# Patient Record
Sex: MALE | Race: WHITE | Employment: FULL TIME | URBAN - NONMETROPOLITAN AREA
[De-identification: names, ages, dates, MRNs, and addresses within clinical notes are randomized per-mention and may not be internally consistent; named-entity substitution may affect disease eponyms.]

---

## 2017-02-02 ENCOUNTER — OFFICE VISIT (OUTPATIENT)
Dept: URGENT CARE | Age: 32
End: 2017-02-02
Payer: COMMERCIAL

## 2017-02-02 VITALS
TEMPERATURE: 98.2 F | RESPIRATION RATE: 16 BRPM | OXYGEN SATURATION: 99 % | DIASTOLIC BLOOD PRESSURE: 86 MMHG | HEART RATE: 74 BPM | SYSTOLIC BLOOD PRESSURE: 128 MMHG | BODY MASS INDEX: 28.06 KG/M2 | WEIGHT: 190 LBS

## 2017-02-02 DIAGNOSIS — J02.9 SORE THROAT: Primary | ICD-10-CM

## 2017-02-02 DIAGNOSIS — Z20.818 STREP THROAT EXPOSURE: ICD-10-CM

## 2017-02-02 LAB — S PYO AG THROAT QL: NORMAL

## 2017-02-02 PROCEDURE — 99213 OFFICE O/P EST LOW 20 MIN: CPT | Performed by: NURSE PRACTITIONER

## 2017-02-02 PROCEDURE — 87880 STREP A ASSAY W/OPTIC: CPT | Performed by: NURSE PRACTITIONER

## 2017-02-02 RX ORDER — CEFDINIR 300 MG/1
300 CAPSULE ORAL 2 TIMES DAILY
Qty: 20 CAPSULE | Refills: 0 | Status: SHIPPED | OUTPATIENT
Start: 2017-02-02 | End: 2017-02-12

## 2017-02-02 RX ORDER — FLUTICASONE PROPIONATE 50 MCG
1 SPRAY, SUSPENSION (ML) NASAL DAILY
Qty: 1 BOTTLE | Refills: 3 | Status: SHIPPED | OUTPATIENT
Start: 2017-02-02 | End: 2017-07-06 | Stop reason: ALTCHOICE

## 2017-02-02 ASSESSMENT — ENCOUNTER SYMPTOMS
COUGH: 0
EYES NEGATIVE: 1
SORE THROAT: 1
SINUS PRESSURE: 1
GASTROINTESTINAL NEGATIVE: 1

## 2017-07-06 ENCOUNTER — OFFICE VISIT (OUTPATIENT)
Dept: URGENT CARE | Age: 32
End: 2017-07-06
Payer: COMMERCIAL

## 2017-07-06 VITALS
BODY MASS INDEX: 30.36 KG/M2 | HEIGHT: 69 IN | OXYGEN SATURATION: 97 % | TEMPERATURE: 97.9 F | WEIGHT: 205 LBS | SYSTOLIC BLOOD PRESSURE: 128 MMHG | RESPIRATION RATE: 20 BRPM | DIASTOLIC BLOOD PRESSURE: 88 MMHG | HEART RATE: 67 BPM

## 2017-07-06 DIAGNOSIS — Z20.818 EXPOSURE TO STREPTOCOCCAL PHARYNGITIS: Primary | ICD-10-CM

## 2017-07-06 DIAGNOSIS — J02.9 PHARYNGITIS, UNSPECIFIED ETIOLOGY: ICD-10-CM

## 2017-07-06 DIAGNOSIS — J02.9 SORE THROAT: ICD-10-CM

## 2017-07-06 LAB — S PYO AG THROAT QL: NORMAL

## 2017-07-06 PROCEDURE — 87880 STREP A ASSAY W/OPTIC: CPT | Performed by: NURSE PRACTITIONER

## 2017-07-06 PROCEDURE — 99213 OFFICE O/P EST LOW 20 MIN: CPT | Performed by: NURSE PRACTITIONER

## 2017-07-06 RX ORDER — CEFDINIR 300 MG/1
300 CAPSULE ORAL 2 TIMES DAILY
Qty: 20 CAPSULE | Refills: 0 | Status: SHIPPED | OUTPATIENT
Start: 2017-07-06 | End: 2017-10-19 | Stop reason: SDUPTHER

## 2017-07-06 ASSESSMENT — ENCOUNTER SYMPTOMS
ABDOMINAL PAIN: 0
SORE THROAT: 1

## 2017-08-28 ENCOUNTER — OFFICE VISIT (OUTPATIENT)
Dept: FAMILY MEDICINE CLINIC | Age: 32
End: 2017-08-28
Payer: COMMERCIAL

## 2017-08-28 VITALS
WEIGHT: 193 LBS | OXYGEN SATURATION: 98 % | TEMPERATURE: 97.2 F | BODY MASS INDEX: 28.58 KG/M2 | RESPIRATION RATE: 18 BRPM | HEART RATE: 63 BPM | DIASTOLIC BLOOD PRESSURE: 82 MMHG | SYSTOLIC BLOOD PRESSURE: 120 MMHG | HEIGHT: 69 IN

## 2017-08-28 DIAGNOSIS — J45.20 ASTHMA, MILD INTERMITTENT, WELL-CONTROLLED: ICD-10-CM

## 2017-08-28 DIAGNOSIS — Z00.00 ANNUAL PHYSICAL EXAM: Primary | ICD-10-CM

## 2017-08-28 DIAGNOSIS — J30.89 PERENNIAL ALLERGIC RHINITIS: ICD-10-CM

## 2017-08-28 DIAGNOSIS — F41.8 DEPRESSION WITH ANXIETY: ICD-10-CM

## 2017-08-28 PROBLEM — R53.83 FATIGUE: Status: ACTIVE | Noted: 2017-08-28

## 2017-08-28 PROCEDURE — 99395 PREV VISIT EST AGE 18-39: CPT | Performed by: INTERNAL MEDICINE

## 2017-08-28 RX ORDER — TRAZODONE HYDROCHLORIDE 50 MG/1
50 TABLET ORAL NIGHTLY
COMMUNITY
End: 2017-08-28 | Stop reason: ALTCHOICE

## 2017-08-28 ASSESSMENT — ENCOUNTER SYMPTOMS
WHEEZING: 0
BLOOD IN STOOL: 0
COUGH: 0
VOMITING: 0
EYE REDNESS: 0
EYE DISCHARGE: 0
SINUS PRESSURE: 0
COLOR CHANGE: 0
ABDOMINAL PAIN: 0
SORE THROAT: 0
DIARRHEA: 0
EYE PAIN: 0
RHINORRHEA: 0
SHORTNESS OF BREATH: 0
VOICE CHANGE: 0
CHEST TIGHTNESS: 0

## 2017-08-28 ASSESSMENT — PATIENT HEALTH QUESTIONNAIRE - PHQ9
SUM OF ALL RESPONSES TO PHQ QUESTIONS 1-9: 0
1. LITTLE INTEREST OR PLEASURE IN DOING THINGS: 0
SUM OF ALL RESPONSES TO PHQ9 QUESTIONS 1 & 2: 0
2. FEELING DOWN, DEPRESSED OR HOPELESS: 0

## 2017-09-11 DIAGNOSIS — Z00.00 ANNUAL PHYSICAL EXAM: ICD-10-CM

## 2017-09-11 LAB
ALBUMIN SERPL-MCNC: 4.6 G/DL (ref 3.5–5.2)
ALP BLD-CCNC: 80 U/L (ref 40–130)
ALT SERPL-CCNC: 20 U/L (ref 5–41)
ANION GAP SERPL CALCULATED.3IONS-SCNC: 17 MMOL/L (ref 7–19)
AST SERPL-CCNC: 18 U/L (ref 5–40)
BASOPHILS ABSOLUTE: 0 K/UL (ref 0–0.2)
BASOPHILS RELATIVE PERCENT: 0.5 % (ref 0–1)
BILIRUB SERPL-MCNC: 0.7 MG/DL (ref 0.2–1.2)
BUN BLDV-MCNC: 12 MG/DL (ref 6–20)
CALCIUM SERPL-MCNC: 9.4 MG/DL (ref 8.6–10)
CHLORIDE BLD-SCNC: 99 MMOL/L (ref 98–111)
CHOLESTEROL, TOTAL: 152 MG/DL (ref 160–199)
CO2: 24 MMOL/L (ref 22–29)
CREAT SERPL-MCNC: 1.1 MG/DL (ref 0.5–1.2)
EOSINOPHILS ABSOLUTE: 0.1 K/UL (ref 0–0.6)
EOSINOPHILS RELATIVE PERCENT: 0.9 % (ref 0–5)
GFR NON-AFRICAN AMERICAN: >60
GLUCOSE BLD-MCNC: 77 MG/DL (ref 74–109)
HCT VFR BLD CALC: 44.2 % (ref 42–52)
HDLC SERPL-MCNC: 46 MG/DL (ref 55–121)
HEMOGLOBIN: 15.8 G/DL (ref 14–18)
LDL CHOLESTEROL CALCULATED: 83 MG/DL
LYMPHOCYTES ABSOLUTE: 2.2 K/UL (ref 1.1–4.5)
LYMPHOCYTES RELATIVE PERCENT: 40.7 % (ref 20–40)
MCH RBC QN AUTO: 30.9 PG (ref 27–31)
MCHC RBC AUTO-ENTMCNC: 35.7 G/DL (ref 33–37)
MCV RBC AUTO: 86.3 FL (ref 80–94)
MONOCYTES ABSOLUTE: 0.3 K/UL (ref 0–0.9)
MONOCYTES RELATIVE PERCENT: 5.7 % (ref 0–10)
NEUTROPHILS ABSOLUTE: 2.8 K/UL (ref 1.5–7.5)
NEUTROPHILS RELATIVE PERCENT: 52 % (ref 50–65)
PDW BLD-RTO: 12.4 % (ref 11.5–14.5)
PLATELET # BLD: 261 K/UL (ref 130–400)
PMV BLD AUTO: 10.2 FL (ref 9.4–12.4)
POTASSIUM SERPL-SCNC: 4 MMOL/L (ref 3.5–5)
RBC # BLD: 5.12 M/UL (ref 4.7–6.1)
SODIUM BLD-SCNC: 140 MMOL/L (ref 136–145)
TOTAL PROTEIN: 7.6 G/DL (ref 6.6–8.7)
TRIGL SERPL-MCNC: 113 MG/DL (ref 150–199)
WBC # BLD: 5.5 K/UL (ref 4.8–10.8)

## 2017-09-13 DIAGNOSIS — R53.83 FATIGUE, UNSPECIFIED TYPE: Primary | ICD-10-CM

## 2017-09-13 DIAGNOSIS — Z00.00 VISIT FOR ANNUAL HEALTH EXAMINATION: ICD-10-CM

## 2017-10-19 ENCOUNTER — OFFICE VISIT (OUTPATIENT)
Dept: URGENT CARE | Age: 32
End: 2017-10-19
Payer: COMMERCIAL

## 2017-10-19 VITALS
SYSTOLIC BLOOD PRESSURE: 124 MMHG | HEART RATE: 73 BPM | TEMPERATURE: 99.1 F | WEIGHT: 188.6 LBS | HEIGHT: 69 IN | OXYGEN SATURATION: 97 % | RESPIRATION RATE: 20 BRPM | DIASTOLIC BLOOD PRESSURE: 73 MMHG | BODY MASS INDEX: 27.93 KG/M2

## 2017-10-19 DIAGNOSIS — Z20.818 EXPOSURE TO STREP THROAT: Primary | ICD-10-CM

## 2017-10-19 DIAGNOSIS — J02.9 SORE THROAT: ICD-10-CM

## 2017-10-19 LAB — S PYO AG THROAT QL: NORMAL

## 2017-10-19 PROCEDURE — 87880 STREP A ASSAY W/OPTIC: CPT | Performed by: NURSE PRACTITIONER

## 2017-10-19 PROCEDURE — 99213 OFFICE O/P EST LOW 20 MIN: CPT | Performed by: NURSE PRACTITIONER

## 2017-10-19 RX ORDER — CEFDINIR 300 MG/1
300 CAPSULE ORAL 2 TIMES DAILY
Qty: 20 CAPSULE | Refills: 0 | Status: SHIPPED | OUTPATIENT
Start: 2017-10-19 | End: 2017-10-29

## 2017-10-19 ASSESSMENT — ENCOUNTER SYMPTOMS
SORE THROAT: 1
GASTROINTESTINAL NEGATIVE: 1
COUGH: 0

## 2017-10-19 NOTE — PROGRESS NOTES
[address] [facility] [address] [address]  Dept: [phone]  Loc: [phone]    Sukhwinder Wills is a 32 y.o. male who presents today for his medical conditions/complaints as noted below. Sukhwinder Wills is c/o of Pharyngitis (x 2 days- wife has strep throat) and Ear Fullness        HPI:     HPI     Patient presents to office complaining of sore throat and ear fullness for the past 2 days. He states that his throat has been getting more sore and he started with low grade fever today. He states that his ears feel full. He denies any headache, abd pain, vomiting or diarrhea. He states that his wife tested positive for Strep on Monday of this week. Past Medical History:   Diagnosis Date    Anxiety     Asthma     childhood - no problems now    Chronic back pain     Gynecomastia     Insomnia     Perennial allergic rhinitis 8/28/2017      Past Surgical History:   Procedure Laterality Date    CYST REMOVAL      CYST REMOVAL      MASTECTOMY  2003    MASTECTOMY Right        Family History   Problem Relation Age of Onset    Heart Disease Paternal Grandfather     Cancer Father      prostate    Diabetes Daughter        Social History   Substance Use Topics    Smoking status: Never Smoker    Smokeless tobacco: Never Used    Alcohol use 0.0 oz/week      Comment: rarely      Current Outpatient Prescriptions   Medication Sig Dispense Refill    cefdinir (OMNICEF) 300 MG capsule Take 1 capsule by mouth 2 times daily for 10 days 20 capsule 0    Multiple Vitamins-Minerals (THERAPEUTIC MULTIVITAMIN-MINERALS) tablet Take 1 tablet by mouth daily      Fexofenadine HCl (ALLEGRA PO) Take by mouth       No current facility-administered medications for this visit.       Allergies   Allergen Reactions    Amoxicillin     Other Rash     STERI STRIPS    Pcn [Penicillins] Rash       Health Maintenance   Topic Date Due    HIV screen  12/16/2000   

## 2017-10-19 NOTE — PATIENT INSTRUCTIONS
Patient Education        Sore Throat: Care Instructions  Your Care Instructions    Infection by bacteria or a virus causes most sore throats. Cigarette smoke, dry air, air pollution, allergies, and yelling can also cause a sore throat. Sore throats can be painful and annoying. Fortunately, most sore throats go away on their own. If you have a bacterial infection, your doctor may prescribe antibiotics. Follow-up care is a key part of your treatment and safety. Be sure to make and go to all appointments, and call your doctor if you are having problems. It's also a good idea to know your test results and keep a list of the medicines you take. How can you care for yourself at home? · If your doctor prescribed antibiotics, take them as directed. Do not stop taking them just because you feel better. You need to take the full course of antibiotics. · Gargle with warm salt water once an hour to help reduce swelling and relieve discomfort. Use 1 teaspoon of salt mixed in 1 cup of warm water. · Take an over-the-counter pain medicine, such as acetaminophen (Tylenol), ibuprofen (Advil, Motrin), or naproxen (Aleve). Read and follow all instructions on the label. · Be careful when taking over-the-counter cold or flu medicines and Tylenol at the same time. Many of these medicines have acetaminophen, which is Tylenol. Read the labels to make sure that you are not taking more than the recommended dose. Too much acetaminophen (Tylenol) can be harmful. · Drink plenty of fluids. Fluids may help soothe an irritated throat. Hot fluids, such as tea or soup, may help decrease throat pain. · Use over-the-counter throat lozenges to soothe pain. Regular cough drops or hard candy may also help. These should not be given to young children because of the risk of choking. · Do not smoke or allow others to smoke around you. If you need help quitting, talk to your doctor about stop-smoking programs and medicines.  These can increase your

## 2018-03-15 ENCOUNTER — OFFICE VISIT (OUTPATIENT)
Dept: FAMILY MEDICINE CLINIC | Age: 33
End: 2018-03-15
Payer: COMMERCIAL

## 2018-03-15 ENCOUNTER — HOSPITAL ENCOUNTER (OUTPATIENT)
Dept: ULTRASOUND IMAGING | Age: 33
Discharge: HOME | End: 2018-03-15
Payer: COMMERCIAL

## 2018-03-15 VITALS
BODY MASS INDEX: 29.51 KG/M2 | TEMPERATURE: 98.1 F | HEIGHT: 69 IN | WEIGHT: 199.2 LBS | OXYGEN SATURATION: 98 % | SYSTOLIC BLOOD PRESSURE: 134 MMHG | HEART RATE: 81 BPM | DIASTOLIC BLOOD PRESSURE: 88 MMHG

## 2018-03-15 DIAGNOSIS — R10.32 LEFT GROIN PAIN: Primary | ICD-10-CM

## 2018-03-15 DIAGNOSIS — R10.32 LEFT GROIN PAIN: ICD-10-CM

## 2018-03-15 PROCEDURE — 76705 ECHO EXAM OF ABDOMEN: CPT

## 2018-03-15 PROCEDURE — 99213 OFFICE O/P EST LOW 20 MIN: CPT | Performed by: CLINICAL NURSE SPECIALIST

## 2018-03-15 ASSESSMENT — ENCOUNTER SYMPTOMS
SORE THROAT: 0
TROUBLE SWALLOWING: 0
SHORTNESS OF BREATH: 0
VOMITING: 0
ABDOMINAL DISTENTION: 0
DIARRHEA: 0
SINUS PRESSURE: 0
WHEEZING: 0
NAUSEA: 0
CONSTIPATION: 0
BACK PAIN: 0
ABDOMINAL PAIN: 0
COLOR CHANGE: 0
COUGH: 0
CHEST TIGHTNESS: 0

## 2018-03-15 NOTE — PROGRESS NOTES
swallowing. Respiratory: Negative for cough, chest tightness, shortness of breath and wheezing. Cardiovascular: Negative for chest pain, palpitations and leg swelling. Gastrointestinal: Negative for abdominal distention, abdominal pain, constipation, diarrhea, nausea and vomiting. Genitourinary: Positive for testicular pain. Negative for difficulty urinating, dysuria, flank pain, frequency, hematuria, scrotal swelling and urgency. Musculoskeletal: Negative for arthralgias and back pain. Skin: Negative for color change and rash. Neurological: Negative for dizziness, syncope, weakness, light-headedness and headaches. Psychiatric/Behavioral: Negative for confusion. The patient is not nervous/anxious. OBJECTIVE:  /88   Pulse 81   Temp 98.1 °F (36.7 °C) (Tympanic)   Ht 5' 9\" (1.753 m)   Wt 199 lb 3.2 oz (90.4 kg)   SpO2 98%   BMI 29.42 kg/m²    Physical Exam   Constitutional: He is oriented to person, place, and time. He appears well-developed and well-nourished. No distress. HENT:   Head: Normocephalic and atraumatic. Mouth/Throat: Oropharynx is clear and moist.   Eyes: Conjunctivae are normal. Pupils are equal, round, and reactive to light. Right eye exhibits no discharge. Left eye exhibits no discharge. Cardiovascular: Normal rate and regular rhythm. No murmur heard. Pulmonary/Chest: Effort normal and breath sounds normal. No respiratory distress. He has no wheezes. He has no rales. Abdominal: Soft. Bowel sounds are normal. He exhibits no distension. There is no tenderness. A hernia is present. Hernia confirmed positive in the left inguinal area. Musculoskeletal: Normal range of motion. He exhibits no deformity. Neurological: He is alert and oriented to person, place, and time. No cranial nerve deficit. Skin: Skin is warm and dry. No rash noted. No erythema. Psychiatric: He has a normal mood and affect.  Thought content normal.   Vitals reviewed. ASSESSMENT/PLAN:  1. Left groin pain  Fluctuance noted at left groin, reproducible pain with leg raises  Ultrasound today for further clarification of left groin pain/hernia  - US ABDOMINAL LIMITED; Future         Return for already scheduled.

## 2018-03-28 ENCOUNTER — OFFICE VISIT (OUTPATIENT)
Dept: FAMILY MEDICINE CLINIC | Age: 33
End: 2018-03-28
Payer: COMMERCIAL

## 2018-03-28 VITALS
HEIGHT: 69 IN | OXYGEN SATURATION: 98 % | SYSTOLIC BLOOD PRESSURE: 106 MMHG | TEMPERATURE: 97.9 F | WEIGHT: 183.25 LBS | DIASTOLIC BLOOD PRESSURE: 78 MMHG | BODY MASS INDEX: 27.14 KG/M2 | HEART RATE: 87 BPM

## 2018-03-28 DIAGNOSIS — R10.32 LLQ ABDOMINAL PAIN: Primary | ICD-10-CM

## 2018-03-28 DIAGNOSIS — R10.32 LLQ ABDOMINAL PAIN: ICD-10-CM

## 2018-03-28 LAB
ALBUMIN SERPL-MCNC: 5 G/DL (ref 3.5–5.2)
ALP BLD-CCNC: 83 U/L (ref 40–130)
ALT SERPL-CCNC: 18 U/L (ref 5–41)
ANION GAP SERPL CALCULATED.3IONS-SCNC: 14 MMOL/L (ref 7–19)
AST SERPL-CCNC: 17 U/L (ref 5–40)
BASOPHILS ABSOLUTE: 0 K/UL (ref 0–0.2)
BASOPHILS RELATIVE PERCENT: 1 % (ref 0–1)
BILIRUB SERPL-MCNC: 0.6 MG/DL (ref 0.2–1.2)
BILIRUBIN URINE: NEGATIVE
BLOOD, URINE: NEGATIVE
BUN BLDV-MCNC: 17 MG/DL (ref 6–20)
CALCIUM SERPL-MCNC: 9.5 MG/DL (ref 8.6–10)
CHLORIDE BLD-SCNC: 99 MMOL/L (ref 98–111)
CLARITY: CLEAR
CO2: 27 MMOL/L (ref 22–29)
COLOR: YELLOW
CREAT SERPL-MCNC: 0.9 MG/DL (ref 0.5–1.2)
EOSINOPHILS ABSOLUTE: 0 K/UL (ref 0–0.6)
EOSINOPHILS RELATIVE PERCENT: 0.7 % (ref 0–5)
GFR NON-AFRICAN AMERICAN: >60
GLUCOSE BLD-MCNC: 98 MG/DL (ref 74–109)
GLUCOSE URINE: NEGATIVE MG/DL
HCT VFR BLD CALC: 50.9 % (ref 42–52)
HEMOGLOBIN: 17.9 G/DL (ref 14–18)
KETONES, URINE: 40 MG/DL
LEUKOCYTE ESTERASE, URINE: NEGATIVE
LYMPHOCYTES ABSOLUTE: 1.5 K/UL (ref 1.1–4.5)
LYMPHOCYTES RELATIVE PERCENT: 35.9 % (ref 20–40)
MCH RBC QN AUTO: 29.9 PG (ref 27–31)
MCHC RBC AUTO-ENTMCNC: 35.2 G/DL (ref 33–37)
MCV RBC AUTO: 85 FL (ref 80–94)
MONOCYTES ABSOLUTE: 0.3 K/UL (ref 0–0.9)
MONOCYTES RELATIVE PERCENT: 6.3 % (ref 0–10)
NEUTROPHILS ABSOLUTE: 2.3 K/UL (ref 1.5–7.5)
NEUTROPHILS RELATIVE PERCENT: 55.9 % (ref 50–65)
NITRITE, URINE: NEGATIVE
PDW BLD-RTO: 12 % (ref 11.5–14.5)
PH UA: 6
PLATELET # BLD: 281 K/UL (ref 130–400)
PMV BLD AUTO: 9.7 FL (ref 9.4–12.4)
POTASSIUM SERPL-SCNC: 4 MMOL/L (ref 3.5–5)
PROTEIN UA: NEGATIVE MG/DL
RBC # BLD: 5.99 M/UL (ref 4.7–6.1)
SEDIMENTATION RATE, ERYTHROCYTE: 2 MM/HR (ref 0–10)
SODIUM BLD-SCNC: 140 MMOL/L (ref 136–145)
SPECIFIC GRAVITY UA: 1.01
TOTAL PROTEIN: 7.8 G/DL (ref 6.6–8.7)
URINE TYPE: ABNORMAL
UROBILINOGEN, URINE: 0.2 E.U./DL
WBC # BLD: 4.1 K/UL (ref 4.8–10.8)

## 2018-03-28 PROCEDURE — 99213 OFFICE O/P EST LOW 20 MIN: CPT | Performed by: NURSE PRACTITIONER

## 2018-03-28 ASSESSMENT — ENCOUNTER SYMPTOMS
SORE THROAT: 0
VOMITING: 0
CHEST TIGHTNESS: 0
WHEEZING: 0
BLOOD IN STOOL: 0
ABDOMINAL PAIN: 1
DIARRHEA: 0
COUGH: 0
SHORTNESS OF BREATH: 0
NAUSEA: 0

## 2018-04-06 ENCOUNTER — HOSPITAL ENCOUNTER (OUTPATIENT)
Dept: CT IMAGING | Age: 33
Discharge: HOME | End: 2018-04-06
Payer: COMMERCIAL

## 2018-04-06 DIAGNOSIS — R10.32 LLQ ABDOMINAL PAIN: ICD-10-CM

## 2018-04-06 PROCEDURE — 6360000004 HC RX CONTRAST MEDICATION: Performed by: NURSE PRACTITIONER

## 2018-04-06 PROCEDURE — 74177 CT ABD & PELVIS W/CONTRAST: CPT

## 2018-04-06 RX ADMIN — IOPAMIDOL 75 ML: 755 INJECTION, SOLUTION INTRAVENOUS at 09:44

## 2018-05-30 LAB
CHOLESTEROL, TOTAL: 170 MG/DL (ref 160–199)
GLUCOSE BLD-MCNC: 99 MG/DL (ref 74–109)
HDLC SERPL-MCNC: 52 MG/DL (ref 55–121)
LDL CHOLESTEROL CALCULATED: 106 MG/DL
TRIGL SERPL-MCNC: 62 MG/DL (ref 0–149)

## 2018-06-01 ENCOUNTER — EMPLOYEE WELLNESS (OUTPATIENT)
Dept: OTHER | Age: 33
End: 2018-06-01

## 2018-06-11 VITALS — WEIGHT: 176 LBS | BODY MASS INDEX: 25.99 KG/M2

## 2018-07-05 ENCOUNTER — HOSPITAL ENCOUNTER (EMERGENCY)
Age: 33
Discharge: HOME | End: 2018-07-05
Payer: COMMERCIAL

## 2018-07-05 VITALS
HEIGHT: 69 IN | SYSTOLIC BLOOD PRESSURE: 118 MMHG | WEIGHT: 178 LBS | DIASTOLIC BLOOD PRESSURE: 75 MMHG | HEART RATE: 94 BPM | RESPIRATION RATE: 20 BRPM | OXYGEN SATURATION: 95 % | BODY MASS INDEX: 26.36 KG/M2 | TEMPERATURE: 99.1 F
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
## 2018-07-05 PROCEDURE — 12002 RPR S/N/AX/GEN/TRNK2.6-7.5CM: CPT | Performed by: PHYSICIAN ASSISTANT

## 2018-07-05 PROCEDURE — 99282 EMERGENCY DEPT VISIT SF MDM: CPT

## 2018-07-05 PROCEDURE — 12002 RPR S/N/AX/GEN/TRNK2.6-7.5CM: CPT

## 2018-07-05 PROCEDURE — 90471 IMMUNIZATION ADMIN: CPT | Performed by: PHYSICIAN ASSISTANT

## 2018-07-05 PROCEDURE — 6360000002 HC RX W HCPCS: Performed by: PHYSICIAN ASSISTANT

## 2018-07-05 PROCEDURE — 90715 TDAP VACCINE 7 YRS/> IM: CPT | Performed by: PHYSICIAN ASSISTANT

## 2018-07-05 PROCEDURE — 99283 EMERGENCY DEPT VISIT LOW MDM: CPT | Performed by: PHYSICIAN ASSISTANT

## 2018-07-05 PROCEDURE — 2500000003 HC RX 250 WO HCPCS: Performed by: PHYSICIAN ASSISTANT

## 2018-07-05 PROCEDURE — 6370000000 HC RX 637 (ALT 250 FOR IP): Performed by: PHYSICIAN ASSISTANT

## 2018-07-05 RX ORDER — HYDROCODONE BITARTRATE AND ACETAMINOPHEN 7.5; 325 MG/1; MG/1
1 TABLET ORAL ONCE
Status: COMPLETED | OUTPATIENT
Start: 2018-07-05 | End: 2018-07-05

## 2018-07-05 RX ORDER — DOXYCYCLINE HYCLATE 100 MG
100 TABLET ORAL 2 TIMES DAILY
Qty: 20 TABLET | Refills: 0 | Status: SHIPPED | OUTPATIENT
Start: 2018-07-05 | End: 2018-07-15

## 2018-07-05 RX ORDER — METRONIDAZOLE 500 MG/1
500 TABLET ORAL 3 TIMES DAILY
Qty: 30 TABLET | Refills: 0 | Status: SHIPPED | OUTPATIENT
Start: 2018-07-05 | End: 2018-07-15

## 2018-07-05 RX ORDER — LIDOCAINE HYDROCHLORIDE 10 MG/ML
10 INJECTION, SOLUTION INFILTRATION; PERINEURAL ONCE
Status: COMPLETED | OUTPATIENT
Start: 2018-07-05 | End: 2018-07-05

## 2018-07-05 RX ADMIN — TETANUS TOXOID, REDUCED DIPHTHERIA TOXOID AND ACELLULAR PERTUSSIS VACCINE, ADSORBED 0.5 ML: 5; 2.5; 8; 8; 2.5 SUSPENSION INTRAMUSCULAR at 19:19

## 2018-07-05 RX ADMIN — HYDROCODONE BITARTRATE AND ACETAMINOPHEN 1 TABLET: 7.5; 325 TABLET ORAL at 19:20

## 2018-07-05 RX ADMIN — LIDOCAINE HYDROCHLORIDE 10 ML: 10 INJECTION, SOLUTION INFILTRATION; PERINEURAL at 19:19

## 2018-07-05 ASSESSMENT — PAIN SCALES - GENERAL
PAINLEVEL_OUTOF10: 4
PAINLEVEL_OUTOF10: 5

## 2018-07-05 NOTE — ED NOTES
[place] [employer] dispatch called us about a pt that was bit by aldair Kingston and to call them back when they arrive. I called and spoke with Police Dispatch and they are calling Animal Control to let them know the pt is here.        Evan Herbert RN  07/05/18 [id number]

## 2018-07-06 ASSESSMENT — ENCOUNTER SYMPTOMS
COLOR CHANGE: 0
SHORTNESS OF BREATH: 0
ABDOMINAL DISTENTION: 0
VOMITING: 0
CONSTIPATION: 0
COUGH: 0
NAUSEA: 0
WHEEZING: 0
STRIDOR: 0
ABDOMINAL PAIN: 0
SORE THROAT: 0
CHEST TIGHTNESS: 0
RHINORRHEA: 0
BACK PAIN: 0

## 2018-07-06 NOTE — ED PROVIDER NOTES
eMERGENCY dEPARTMENT eNCOUnter      Pt Name: Landy Dent  MRN: [id number]  [place] 1985  Date of evaluation: 7/5/2018  Provider: Harinder Lion Dr       Chief Complaint   Patient presents with    Animal Bite     Pt to ED with c/o dog bite approx 1 hour PTA         HISTORY OF PRESENT ILLNESS  (Location/Symptom, Timing/Onset, Context/Setting, Quality, Duration, Modifying Factors, Severity.)   Landy Dent is a 28 y.o. male who presents to the emergency department with a dog bite. Patient was walking on the sidewalk when he was attacked by 3 dogs. He is unsure if they are immunized. Animal control has been notified and they're here in the ED with the patient. Unsure of tetanus status. He has multiple puncture wounds and lacerations. Nursing Notes were reviewed and I agree. REVIEW OF SYSTEMS    (2-9 systems for level 4, 10 or more for level 5)     Review of Systems   Constitutional: Negative for chills, fatigue and fever. HENT: Negative for congestion, rhinorrhea, sneezing and sore throat. Respiratory: Negative for cough, chest tightness, shortness of breath, wheezing and stridor. Cardiovascular: Negative for chest pain. Gastrointestinal: Negative for abdominal distention, abdominal pain, constipation, nausea and vomiting. Genitourinary: Negative for dysuria, flank pain and hematuria. Musculoskeletal: Negative for arthralgias, back pain, joint swelling and neck pain. Skin: Positive for wound. Negative for color change and rash. Neurological: Negative for dizziness, syncope and headaches. Psychiatric/Behavioral: Negative for confusion. Except as noted above the remainder of the review of systems was reviewed and negative.        PAST MEDICAL HISTORY     Past Medical History:   Diagnosis Date    Anxiety     Asthma     childhood - no problems now    Chronic back pain     Gynecomastia     Insomnia     Perennial allergic rhinitis 8/28/2017 SURGICAL HISTORY       Past Surgical History:   Procedure Laterality Date    CYST REMOVAL      CYST REMOVAL      MASTECTOMY  2003    MASTECTOMY Right          CURRENT MEDICATIONS       Discharge Medication List as of 7/5/2018  7:59 PM      CONTINUE these medications which have NOT CHANGED    Details   Multiple Vitamins-Minerals (THERAPEUTIC MULTIVITAMIN-MINERALS) tablet Take 1 tablet by mouth daily      Fexofenadine HCl (ALLEGRA PO) Take by mouth             ALLERGIES     Amoxicillin; Other; and Pcn [penicillins]    FAMILY HISTORY       Family History   Problem Relation Age of Onset    Heart Disease Paternal Grandfather     Cancer Father         prostate    Diabetes Daughter           SOCIAL HISTORY       Social History     Social History    Marital status:      Spouse name: N/A    Number of children: N/A    Years of education: N/A     Social History Main Topics    Smoking status: Never Smoker    Smokeless tobacco: Never Used    Alcohol use 0.0 oz/week      Comment: rarely    Drug use: No    Sexual activity: Not Asked     Other Topics Concern    None     Social History Narrative    None       SCREENINGS           PHYSICAL EXAM    (up to 7 for level 4, 8 or more for level 5)     ED Triage Vitals [07/05/18 1842]   BP Temp Temp src Pulse Resp SpO2 Height Weight   118/75 99.1 °F (37.3 °C) -- 94 20 95 % 5' 9\" (1.753 m) 178 lb (80.7 kg)       Physical Exam   Constitutional: He is oriented to person, place, and time. He appears well-developed and well-nourished. No distress. HENT:   Head: Normocephalic and atraumatic. Neck: Normal range of motion. Cardiovascular: Normal rate, regular rhythm and normal heart sounds. Exam reveals no gallop and no friction rub. No murmur heard. Pulmonary/Chest: Effort normal and breath sounds normal. No respiratory distress. He has no wheezes. He has no rales. He exhibits no tenderness. Abdominal: Soft.  Bowel sounds are normal. He exhibits no distension. There is no tenderness. There is no rebound and no guarding. Lymphadenopathy:     He has no cervical adenopathy. Neurological: He is alert and oriented to person, place, and time. Skin: Skin is warm and dry. He is not diaphoretic. Psychiatric: He has a normal mood and affect. Nursing note and vitals reviewed. DIAGNOSTIC RESULTS     RADIOLOGY:   Non-plain film images such as CT, Ultrasound and MRI are read by the radiologist.   Interpretation per the Radiologist below, if available at the time of this note:    No orders to display       LABS:  Labs Reviewed - No data to display    All other labs were within normal range or not returned as of this dictation. EMERGENCY DEPARTMENT COURSE and DIFFERENTIAL DIAGNOSIS/MDM:   Vitals:    Vitals:    07/05/18 1842   BP: 118/75   Pulse: 94   Resp: 20   Temp: 99.1 °F (37.3 °C)   SpO2: 95%   Weight: 178 lb (80.7 kg)   Height: 5' 9\" (1.753 m)           MDM  Number of Diagnoses or Management Options  Dog bite, initial encounter:   Diagnosis management comments: Patient tolerated laceration repair well. All his wounds are copiously irrigated with normal saline. I educated the patient on observing his wounds for signs of infection. We will start him on antibiotic therapy. Animals are in custody of animal control for observation for signs of rabies. I told the patient if any of the animal show signs of rabies he will need to receive rabies vaccinations. He is understanding with the care plan stable ready for discharge. Staples can be removed in one week.       PROCEDURES:    Lac Repair  Date/Time: 7/5/2018 7:57 PM  Performed by: Vonda Pimentel  Authorized by: Vonda Pimentel     Consent:     Consent obtained:  Verbal    Consent given by:  Patient    Risks discussed:  Infection, pain, retained foreign body, tendon damage, poor cosmetic result, need for additional repair, poor wound healing, vascular damage and nerve damage    Alternatives

## 2018-07-13 ENCOUNTER — OFFICE VISIT (OUTPATIENT)
Dept: FAMILY MEDICINE CLINIC | Age: 33
End: 2018-07-13
Payer: COMMERCIAL

## 2018-07-13 VITALS
HEART RATE: 66 BPM | OXYGEN SATURATION: 98 % | DIASTOLIC BLOOD PRESSURE: 80 MMHG | TEMPERATURE: 97.9 F | BODY MASS INDEX: 26.61 KG/M2 | WEIGHT: 180.2 LBS | SYSTOLIC BLOOD PRESSURE: 116 MMHG

## 2018-07-13 DIAGNOSIS — Z48.02 VISIT FOR SUTURE REMOVAL: ICD-10-CM

## 2018-07-13 DIAGNOSIS — S30.0XXD CONTUSION OF BUTTOCK, SUBSEQUENT ENCOUNTER: ICD-10-CM

## 2018-07-13 DIAGNOSIS — S40.819D: ICD-10-CM
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
## 2018-07-13 PROCEDURE — 99213 OFFICE O/P EST LOW 20 MIN: CPT | Performed by: CLINICAL NURSE SPECIALIST

## 2018-07-13 ASSESSMENT — ENCOUNTER SYMPTOMS
SORE THROAT: 0
CHEST TIGHTNESS: 0
SINUS PRESSURE: 0
SHORTNESS OF BREATH: 0
TROUBLE SWALLOWING: 0
WHEEZING: 0
COLOR CHANGE: 1
COUGH: 0

## 2018-09-05 ENCOUNTER — OFFICE VISIT (OUTPATIENT)
Dept: URGENT CARE | Age: 33
End: 2018-09-05
Payer: COMMERCIAL

## 2018-09-05 VITALS
HEIGHT: 69 IN | DIASTOLIC BLOOD PRESSURE: 76 MMHG | BODY MASS INDEX: 27.34 KG/M2 | WEIGHT: 184.6 LBS | SYSTOLIC BLOOD PRESSURE: 114 MMHG | HEART RATE: 71 BPM | RESPIRATION RATE: 18 BRPM | TEMPERATURE: 97.9 F | OXYGEN SATURATION: 98 %

## 2018-09-05 DIAGNOSIS — J06.9 URI, ACUTE: Primary | ICD-10-CM

## 2018-09-05 PROCEDURE — 99213 OFFICE O/P EST LOW 20 MIN: CPT | Performed by: NURSE PRACTITIONER

## 2018-09-05 RX ORDER — GUAIFENESIN 600 MG/1
600 TABLET, EXTENDED RELEASE ORAL 2 TIMES DAILY
Qty: 30 TABLET | Refills: 0 | Status: SHIPPED | OUTPATIENT
Start: 2018-09-05 | End: 2018-09-14 | Stop reason: ALTCHOICE

## 2018-09-05 RX ORDER — FLUTICASONE PROPIONATE 50 MCG
1 SPRAY, SUSPENSION (ML) NASAL DAILY
Qty: 1 BOTTLE | Refills: 3 | Status: SHIPPED | OUTPATIENT
Start: 2018-09-05 | End: 2020-12-07 | Stop reason: ALTCHOICE

## 2018-09-05 RX ORDER — BENZONATATE 100 MG/1
100 CAPSULE ORAL 3 TIMES DAILY PRN
Qty: 21 CAPSULE | Refills: 0 | Status: SHIPPED | OUTPATIENT
Start: 2018-09-05 | End: 2018-09-12

## 2018-09-05 ASSESSMENT — ENCOUNTER SYMPTOMS
RHINORRHEA: 0
CHEST TIGHTNESS: 0
SORE THROAT: 0
ABDOMINAL PAIN: 0
DIARRHEA: 0
NAUSEA: 0
VOMITING: 0
EYES NEGATIVE: 1
VOICE CHANGE: 0
COUGH: 1
WHEEZING: 0
GASTROINTESTINAL NEGATIVE: 1

## 2018-09-05 NOTE — PATIENT INSTRUCTIONS
1. Increase fluids and rest  2. Take antihistamine and decongestant as prescribed  3. Take cough suppressants as prescribed  4. Increase fluids and rest  5.  Return to clinic if symptoms worsen or fail to improve

## 2018-09-14 ENCOUNTER — OFFICE VISIT (OUTPATIENT)
Dept: FAMILY MEDICINE CLINIC | Age: 33
End: 2018-09-14
Payer: COMMERCIAL

## 2018-09-14 VITALS
SYSTOLIC BLOOD PRESSURE: 126 MMHG | HEIGHT: 69 IN | OXYGEN SATURATION: 98 % | DIASTOLIC BLOOD PRESSURE: 76 MMHG | TEMPERATURE: 97.4 F | BODY MASS INDEX: 26.75 KG/M2 | WEIGHT: 180.6 LBS | HEART RATE: 87 BPM

## 2018-09-14 DIAGNOSIS — G89.29 CHRONIC BILATERAL LOW BACK PAIN WITHOUT SCIATICA: ICD-10-CM

## 2018-09-14 DIAGNOSIS — Z23 NEEDS FLU SHOT: ICD-10-CM

## 2018-09-14 DIAGNOSIS — M62.830 MUSCLE SPASM OF BACK: ICD-10-CM

## 2018-09-14 DIAGNOSIS — Z00.00 ANNUAL PHYSICAL EXAM: Primary | ICD-10-CM

## 2018-09-14 DIAGNOSIS — E66.3 OVERWEIGHT: ICD-10-CM

## 2018-09-14 DIAGNOSIS — J30.89 PERENNIAL ALLERGIC RHINITIS: ICD-10-CM

## 2018-09-14 DIAGNOSIS — M54.50 CHRONIC BILATERAL LOW BACK PAIN WITHOUT SCIATICA: ICD-10-CM

## 2018-09-14 DIAGNOSIS — J45.20 ASTHMA, MILD INTERMITTENT, WELL-CONTROLLED: ICD-10-CM

## 2018-09-14 PROBLEM — R53.83 FATIGUE: Status: RESOLVED | Noted: 2017-08-28 | Resolved: 2018-09-14

## 2018-09-14 PROCEDURE — 99395 PREV VISIT EST AGE 18-39: CPT | Performed by: INTERNAL MEDICINE

## 2018-09-14 PROCEDURE — 90682 RIV4 VACC RECOMBINANT DNA IM: CPT | Performed by: INTERNAL MEDICINE

## 2018-09-14 PROCEDURE — 90471 IMMUNIZATION ADMIN: CPT | Performed by: INTERNAL MEDICINE

## 2018-09-14 RX ORDER — ALBUTEROL SULFATE 90 UG/1
2 AEROSOL, METERED RESPIRATORY (INHALATION) EVERY 4 HOURS PRN
Qty: 2 INHALER | Refills: 3 | Status: SHIPPED | OUTPATIENT
Start: 2018-09-14

## 2018-09-14 RX ORDER — METHYLPREDNISOLONE ACETATE 80 MG/ML
80 INJECTION, SUSPENSION INTRA-ARTICULAR; INTRALESIONAL; INTRAMUSCULAR; SOFT TISSUE ONCE
Status: COMPLETED | OUTPATIENT
Start: 2018-09-14 | End: 2018-09-17

## 2018-09-14 ASSESSMENT — ENCOUNTER SYMPTOMS
EYE REDNESS: 0
BLOOD IN STOOL: 0
SINUS PRESSURE: 0
DIARRHEA: 0
EYE PAIN: 0
COUGH: 0
BACK PAIN: 1
VOMITING: 0
WHEEZING: 0
SORE THROAT: 0
COLOR CHANGE: 0
ABDOMINAL PAIN: 0
EYE DISCHARGE: 0
VOICE CHANGE: 0
RHINORRHEA: 0
SHORTNESS OF BREATH: 0
CHEST TIGHTNESS: 0

## 2018-09-14 ASSESSMENT — PATIENT HEALTH QUESTIONNAIRE - PHQ9
SUM OF ALL RESPONSES TO PHQ QUESTIONS 1-9: 0
SUM OF ALL RESPONSES TO PHQ QUESTIONS 1-9: 0
2. FEELING DOWN, DEPRESSED OR HOPELESS: 0
SUM OF ALL RESPONSES TO PHQ9 QUESTIONS 1 & 2: 0
1. LITTLE INTEREST OR PLEASURE IN DOING THINGS: 0

## 2018-09-14 NOTE — PROGRESS NOTES
myalgias. Negative for arthralgias, neck pain and neck stiffness. See HPI   Skin: Negative for color change and rash. Neurological: Negative for dizziness, weakness, numbness and headaches. Hematological: Negative for adenopathy. Does not bruise/bleed easily. Psychiatric/Behavioral: Negative for confusion, dysphoric mood and sleep disturbance. The patient is not nervous/anxious.         Past Medical History:   Diagnosis Date    Anxiety     Chronic back pain     Gynecomastia 2003    right side    Insomnia     Perennial allergic rhinitis 8/28/2017       Current Outpatient Prescriptions   Medication Sig Dispense Refill    albuterol sulfate HFA (PROAIR HFA) 108 (90 Base) MCG/ACT inhaler Inhale 2 puffs into the lungs every 4 hours as needed for Wheezing or Shortness of Breath 2 Inhaler 3    fluticasone (FLONASE) 50 MCG/ACT nasal spray 1 spray by Nasal route daily 1 Bottle 3    Multiple Vitamins-Minerals (THERAPEUTIC MULTIVITAMIN-MINERALS) tablet Take 1 tablet by mouth daily      Fexofenadine HCl (ALLEGRA PO) Take by mouth       Current Facility-Administered Medications   Medication Dose Route Frequency Provider Last Rate Last Dose    methylPREDNISolone acetate (DEPO-MEDROL) injection 80 mg  80 mg Intramuscular Once Breanna Jeter MD           Allergies   Allergen Reactions    Amoxicillin     Other Rash     STERI STRIPS    Pcn [Penicillins] Rash       Past Surgical History:   Procedure Laterality Date    CYST REMOVAL      MASTECTOMY Right 2003    due to gynecomastia       Social History   Substance Use Topics    Smoking status: Never Smoker    Smokeless tobacco: Never Used    Alcohol use 0.0 oz/week      Comment: rarely       Family History   Problem Relation Age of Onset    Heart Disease Paternal Grandfather     Prostate Cancer Father     Diabetes Daughter         Type I       /76   Pulse 87   Temp 97.4 °F (36.3 °C)   Ht 5' 9\" (1.753 m)   Wt 180 lb 9.6 oz (81.9 kg) HGB 17.9 03/28/2018    HCT 50.9 03/28/2018    MCV 85.0 03/28/2018     03/28/2018    LYMPHOPCT 35.9 03/28/2018    RBC 5.99 03/28/2018    MCH 29.9 03/28/2018    MCHC 35.2 03/28/2018    RDW 12.0 03/28/2018     Lab Results   Component Value Date     03/28/2018    K 4.0 03/28/2018    CL 99 03/28/2018    CO2 27 03/28/2018    BUN 17 03/28/2018    CREATININE 0.9 03/28/2018    GLUCOSE 99 05/30/2018    CALCIUM 9.5 03/28/2018     Lab Results   Component Value Date    CHOL 170 05/30/2018    TRIG 62 05/30/2018    HDL 52 05/30/2018    LDLCALC 106 05/30/2018         Assessment:    ICD-10-CM ICD-9-CM    1. Annual physical exam Z00.00 V70.0    2. Asthma, mild intermittent, well-controlled J45.20 493.90 albuterol sulfate HFA (PROAIR HFA) 108 (90 Base) MCG/ACT inhaler   3. Perennial allergic rhinitis J30.89 477.8    4. Chronic bilateral low back pain without sciatica M54.5 724.2 methylPREDNISolone acetate (DEPO-MEDROL) injection 80 mg    G89.29 338.29    5. Muscle spasm of back M62.830 724.8 methylPREDNISolone acetate (DEPO-MEDROL) injection 80 mg   6. Overweight E66.3 278.02    7. Needs flu shot Z23 V04.81 Influenza, Quadv, Recombinant, 18 years and older, IM, PF, Prefill Syr or SDV, 0.5mL (FLUBLOK QUADV, PF)       Plan:  David Junior was seen today for annual exam.    Diagnoses and all orders for this visit:    Annual physical exam    Asthma, mild intermittent, well-controlled  -     albuterol sulfate HFA (PROAIR HFA) 108 (90 Base) MCG/ACT inhaler;  Inhale 2 puffs into the lungs every 4 hours as needed for Wheezing or Shortness of Breath    Perennial allergic rhinitis    Chronic bilateral low back pain without sciatica  -     methylPREDNISolone acetate (DEPO-MEDROL) injection 80 mg; Inject 1 mL into the muscle once    Muscle spasm of back  -     methylPREDNISolone acetate (DEPO-MEDROL) injection 80 mg; Inject 1 mL into the muscle once    Overweight    Needs flu shot  -     Influenza, Quadv, Recombinant, 18 years and older, for you, tell your doctor if you have:  · heart disease, high blood pressure, congestive heart failure;  · a heart rhythm disorder;  · a seizure disorder such as epilepsy;  · diabetes;  · overactive thyroid; or  · low levels of potassium in your blood. It is not known whether this medicine will harm an unborn baby. Tell your doctor if you are pregnant or plan to become pregnant. It is not known whether albuterol inhalation passes into breast milk or if it could harm a nursing baby. Tell your doctor if you are breast-feeding a baby. Albuterol inhalation is not approved for use by anyone younger than 3years old. How should I use albuterol inhalation? Follow all directions on your prescription label. Do not use this medicine in larger or smaller amounts or for longer than recommended. Read all patient information, medication guides, and instruction sheets provided to you. Ask your doctor or pharmacist if you have any questions. You may need to prime your inhaler device before the first use. Your medicine comes with directions for priming if needed. You may also need to shake your medicine just before each use. Follow all medication instructions very carefully. Do not allow a young child to use albuterol inhalation without help from an adult. The usual dose of albuterol inhalation is 2 inhalations every 4 to 6 hours. To prevent exercise-induced bronchospasm, use 2 inhalations 15 to 30 minutes before you exercise. The effects of albuterol inhalation should last about 4 to 6 hours. Seek medical attention if you think your asthma medications are not working as well. An increased need for medication could be an early sign of a serious asthma attack. Use the dose counter on your inhaler device and get your prescription refilled before you run out of medicine completely. Always use the new inhaler device provided with your refill. Do not float a medicine canister in water to see if it is empty.    Follow all in the [place] and therefore [employer] does not warrant that uses outside of the [place] are appropriate, unless specifically indicated otherwise. [employer] drug information does not endorse drugs, diagnose patients or recommend therapy. [employer] drug information is an informational resource designed to assist licensed healthcare practitioners in caring for their patients and/or to serve consumers viewing this service as a supplement to, and not a substitute for, the expertise, skill, knowledge and judgment of healthcare practitioners. The absence of a warning for a given drug or drug combination in no way should be construed to indicate that the drug or drug combination is safe, effective or appropriate for any given patient. [employer] does not assume any responsibility for any aspect of healthcare administered with the aid of information [employer] provides. The information contained herein is not intended to cover all possible uses, directions, precautions, warnings, drug interactions, allergic reactions, or adverse effects. If you have questions about the drugs you are taking, check with your doctor, nurse or pharmacist.  Copyright 1004-9805 [address]. Version: 7.01. Revision date: 8/26/2015. Care instructions adapted under license by [facility] ([facility]). If you have questions about a medical condition or this instruction, always ask your healthcare professional. [place] [address] any warranty or liability for your use of this information. Patient Education        Acute Low Back Pain: Exercises  Your Care Instructions  Here are some examples of typical rehabilitation exercises for your condition. Start each exercise slowly. Ease off the exercise if you start to have pain. Your doctor or physical therapist will tell you when you can start these exercises and which ones will work best for you.   When you are not being active, find a comfortable position for rest. Some people are Exercises  Your Care Instructions  Here are some examples of typical exercises for your condition. Start each exercise slowly. Ease off the exercise if you start to have pain. Your doctor or physical therapist will tell you when you can start these exercises and which ones will work best for you. To prepare, make sure that your ball is the right size for you. When inflated and firm, it should allow you to sit with your hips and knees bent at about a 90-degree angle (like the letter L). How to do the exercises  Seated position on ball    7. Use this exercise to get used to moving on the ball and to find your best sitting position. 8. Sit comfortably on the ball with your feet about hip-width apart. If you feel unsteady, rest your hands on the ball near your hips. 9. As you do this exercise, try to keep your shoulders and upper body relaxed and still. 10. Using your stomach and back muscles to move your pelvis, roll the ball forward. This will round your back. 11. Still using your stomach and back muscles, roll the ball back. You will arch your back. 12. Repeat this rounding-arching motion a few times. 13. Stop in between the two positions, where your back is not rounded or arched. This is called your neutral position. Pelvic rotation    1. Sit tall on the ball. 2. Slowly rotate your hips in a [place] pattern. Keep the movement focused at your hips. 3. Repeat, but [place] in the other direction. 4. Repeat 8 to 12 times. Postural sitting    1. Use this position to find a stable, relaxed posture on the ball. You can use this position as your starting point for other ball exercises. If you feel unsteady on the ball, start on a chair first.  2. Sit on a ball or chair, with your feet planted straight in front of you. 3. Imagine that a string at the top of your head is pulling you straight up. Think of yourself as 2 inches taller than you are.  4. Slightly tuck your chin.   5. Keep your shoulders back and relaxed. Knee extension    1. Sit tall on the ball with your feet planted in front of you, hip-width apart. As you do this exercise, avoid slumping your shoulders and arching your back. 2. Rest your hands on the ball near your hip or a steady object next to you. (If you feel very stable on the ball, rest your hands in your lap or at your side.)  3. Slowly straighten one leg at the knee. Slowly lower it back down. Repeat with the other leg. 4. Repeat this exercise 8 to 12 times. Roll-ups    1. Lie on your back with your knees bent, feet resting on the floor. 2. Lay the ball on your thighs. Rest your hands up high on the ball. 3. Raising your head and shoulder blades, roll the ball up your thighs. Exhale as you roll up. 4. If this is hard on your neck, gently support your lower head and upper neck with one hand. Don't use that hand to pull your head up. 5. Repeat 8 to 12 times. Ball curls    1. Lie on your back with your ankles resting on the ball, knees straight. 2. Use your legs to roll the exercise ball toward you. Allow your knees to bend and move closer to your chest.  3. Pause briefly, and then roll the ball to the starting position. Try to keep the ball rolling straight. You will feel the muscles in your lower belly working. 4. Repeat 8 to 12 times. Bridge with ball under legs    1. Lie on your back with your legs up, calves resting on the ball. For more challenge, rest your heels on the ball. 2. Look up at the ceiling, and keep your chin relaxed. You can place a small pillow under your head or neck for comfort. 3. With your arms by your side, press your hands onto the floor for stability. 4. Tighten your belly muscles by pulling in your belly button toward your spine. 5. Push your heels down toward the floor, squeeze your buttocks, and lift your hips off the floor until your shoulders, hips, and knees are all in a straight line. 6. Try to keep the ball steady.  Hold for about 6 seconds as you

## 2018-09-14 NOTE — PATIENT INSTRUCTIONS
condoms. For men  · Tests for sexually transmitted infections (STIs). Ask whether you should have tests for STIs. You may be at risk if you have sex with more than one person, especially if you do not wear a condom. · Testicular cancer exam. Ask your doctor whether you should check your testicles regularly. · Prostate exam. Talk to your doctor about whether you should have a blood test (called a PSA test) for prostate cancer. Experts differ on whether and when men should have this test. Some experts suggest it if you are older than 39 and are -American or have a father or brother who got prostate cancer when he was younger than 72. When should you call for help? Watch closely for changes in your health, and be sure to contact your doctor if you have any problems or symptoms that concern you. Where can you learn more? Go to https://[email or web address].[email or web address] org and sign in to your GVISP 1 account. Enter P072 in the [employer] box to learn more about \"Well Visit, Ages 25 to 48: Care Instructions. \"     If you do not have an account, please click on the \"Sign Up Now\" link. Current as of: May 16, 2017  Content Version: 11.7  © 3679-2934 [employer] [address], [employer]. Care instructions adapted under license by [facility] ([facility]). If you have questions about a medical condition or this instruction, always ask your healthcare professional. [place] [address] any warranty or liability for your use of this information. Patient Education        Starting a Weight Loss Plan: Care Instructions  Your Care Instructions    If you are thinking about losing weight, it can be hard to know where to start. Your doctor can help you set up a weight loss plan that best meets your needs. You may want to take a class on nutrition or exercise, or join a weight loss support group.  If you have questions about how to make changes to your eating or exercise habits, ask your doctor about seeing a area.  Where can you learn more? Go to [email or web address] and sign in to your Admitly account. Enter C082 in the [facility] box to learn more about \"Starting a Weight Loss Plan: Care Instructions. \"     If you do not have an account, please click on the \"Sign Up Now\" link. Current as of: October 9, 2017  Content Version: 11.7  © 0511-1134 [employer]. Care instructions adapted under license by [facility] ([facility]). If you have questions about a medical condition or this instruction, always ask your healthcare professional. [place] [address] any warranty or liability for your use of this information. Patient Education        Learning About Asthma Triggers  What are asthma triggers? When you have asthma, certain things can make your symptoms worse. These are called triggers. Learn what triggers an asthma attack for you, and avoid the triggers when you can. Common triggers include colds, smoke, air pollution, dust, pollen, pets, stress, and cold air. How do asthma triggers affect you? Triggers can make it harder for your lungs to work as they should. They can lead to sudden breathing problems and other symptoms. When you are around a trigger, an asthma attack is more likely. If your symptoms are severe, you may need emergency treatment or have to go to the hospital for treatment. What can you do to avoid triggers? The first thing is to know your triggers. When you are having symptoms, note the things around you that might be causing them. Then look for patterns that may be triggering your symptoms. Record your triggers on a piece of paper or in an asthma diary. When you have your list of possible triggers, work with your doctor to find ways to avoid them. Avoid colds and flu. Get a pneumococcal vaccine shot. If you have had one before, ask your doctor whether you need a second dose. Get a flu vaccine every year, as soon as it's available.  If you must be around people with colds or the flu, wash your hands often. Here are some ways to avoid a few common triggers. · Do not smoke or allow others to smoke around you. If you need help quitting, talk to your doctor about stop-smoking programs and medicines. These can increase your chances of quitting for good. · If there is a lot of pollution, pollen, or dust outside, stay at home and keep your windows closed. Use an air conditioner or air filter in your home. Check your local weather report or newspaper for air quality and pollen reports. What else should you know? · Take your controller medicine every day, not just when you have symptoms. It helps prevent problems before they occur. · Your doctor may suggest that you check how well your lungs are working by measuring your peak expiratory flow (PEF) throughout the day. Your PEF may drop when you are near things that trigger symptoms. Where can you learn more? Go to https://TouchPal.Fluid-1. org and sign in to your Architonic account. Enter C829 in the [employer] box to learn more about \"Learning About Asthma Triggers. \"     If you do not have an account, please click on the \"Sign Up Now\" link. Current as of: December 6, 2017  Content Version: 11.7  © 9854-1680 [employer]. Care instructions adapted under license by [facility] ([facility]). If you have questions about a medical condition or this instruction, always ask your healthcare professional. [place] [address] any warranty or liability for your use of this information. Patient Education          albuterol inhalation  Pronunciation:  al [place] [address] all  Brand:  ProAir HFA, ProAir RespiClick, Proventil HFA, Ventolin HFA  What is the most important information I should know about albuterol inhalation? You should not use albuterol if you are allergic to proteins. What is albuterol inhalation?   Albuterol is a bronchodilator that relaxes muscles in the airways dose of albuterol inhalation is 2 inhalations every 4 to 6 hours. To prevent exercise-induced bronchospasm, use 2 inhalations 15 to 30 minutes before you exercise. The effects of albuterol inhalation should last about 4 to 6 hours. Seek medical attention if you think your asthma medications are not working as well. An increased need for medication could be an early sign of a serious asthma attack. Use the dose counter on your inhaler device and get your prescription refilled before you run out of medicine completely. Always use the new inhaler device provided with your refill. Do not float a medicine canister in water to see if it is empty. Follow all product instructions on how to clean your inhaler device and mouthpiece. Do not try to clean or take apart the ProAir RespiClick inhaler device. Asthma is often treated with a combination of drugs. Use all medications as directed by your doctor. Read the medication guide or patient instructions provided with each medication. Do not change your doses or medication schedule without your doctor's advice. Store this medicine at room temperature away from moisture, heat, or cold temperatures. Keep the medicine canister away from open flame or high heat, such as in a car on a hot day. The canister may explode if it gets too hot. Do not puncture or burn an empty inhaler canister. What happens if I miss a dose? Use the missed dose as soon as you remember. Skip the missed dose if it is almost time for your next scheduled dose. Do not use extra medicine to make up the missed dose. What happens if I overdose? Seek emergency medical attention or call the Poison Help line at 1-802.721.4525. An overdose of albuterol can be fatal.  Overdose symptoms may include dry mouth, tremors, chest pain, fast heartbeats, nausea, general ill feeling, seizure (convulsions), feeling light-headed or fainting. What should I avoid while using albuterol inhalation?   Rinse with water if interactions are listed in this medication guide. Where can I get more information? Your pharmacist can provide more information about albuterol inhalation. Remember, keep this and all other medicines out of the reach of children, never share your medicines with others, and use this medication only for the indication prescribed. Every effort has been made to ensure that the information provided by [facility] is accurate, up-to-date, and complete, but no guarantee is made to that effect. Drug information contained herein may be time sensitive. [facility] information has been compiled for use by healthcare practitioners and consumers in the [place] and therefore [facility] does not warrant that uses outside of the [place] are appropriate, unless specifically indicated otherwise. [facility]'s drug information does not endorse drugs, diagnose patients or recommend therapy. [facility]'s drug information is an informational resource designed to assist licensed healthcare practitioners in caring for their patients and/or to serve consumers viewing this service as a supplement to, and not a substitute for, the expertise, skill, knowledge and judgment of healthcare practitioners. The absence of a warning for a given drug or drug combination in no way should be construed to indicate that the drug or drug combination is safe, effective or appropriate for any given patient. [facility] does not assume any responsibility for any aspect of healthcare administered with the aid of information [facility] provides. The information contained herein is not intended to cover all possible uses, directions, precautions, warnings, drug interactions, allergic reactions, or adverse effects. If you have questions about the drugs you are taking, check with your doctor, nurse or pharmacist.  Copyright 0302-6825 [address] Version: 7.01. Revision date: 8/26/2015. Care instructions adapted under license by [facility] ([facility]).  If you have questions about a medical condition or this instruction, always ask your healthcare professional. [address] any warranty or liability for your use of this information. Patient Education        Acute Low Back Pain: Exercises  Your Care Instructions  Here are some examples of typical rehabilitation exercises for your condition. Start each exercise slowly. Ease off the exercise if you start to have pain. Your doctor or physical therapist will tell you when you can start these exercises and which ones will work best for you. When you are not being active, find a comfortable position for rest. Some people are comfortable on the floor or a medium-firm bed with a small pillow under their head and another under their knees. Some people prefer to lie on their side with a pillow between their knees. Don't stay in one position for too long. Take short walks (10 to 20 minutes) every 2 to 3 hours. Avoid slopes, hills, and stairs until you feel better. Walk only distances you can manage without pain, especially leg pain. How to do the exercises  Back stretches    1. Get down on your hands and knees on the floor. 2. Relax your head and allow it to droop. Round your back up toward the ceiling until you feel a nice stretch in your upper, middle, and lower back. Hold this stretch for as long as it feels comfortable, or about 15 to 30 seconds. 3. Return to the starting position with a flat back while you are on your hands and knees. 4. Let your back sway by pressing your stomach toward the floor. Lift your buttocks toward the ceiling. 5. Hold this position for 15 to 30 seconds. 6. Repeat 2 to 4 times. Follow-up care is a key part of your treatment and safety. Be sure to make and go to all appointments, and call your doctor if you are having problems. It's also a good idea to know your test results and keep a list of the medicines you take. Where can you learn more?   Go to [email or web address].[email or web address] and sign in to your StatAce account. Enter H227 in the [employer] box to learn more about \"Acute Low Back Pain: Exercises. \"     If you do not have an account, please click on the \"Sign Up Now\" link. Current as of: November 29, 2017  Content Version: 11.7  © 6497-8682 SealedMedia. Care instructions adapted under license by [facility] ([facility]). If you have questions about a medical condition or this instruction, always ask your healthcare professional. [address] any warranty or liability for your use of this information. Patient Education        Therapeutic Ball: Back Exercises  Your Care Instructions  Here are some examples of typical exercises for your condition. Start each exercise slowly. Ease off the exercise if you start to have pain. Your doctor or physical therapist will tell you when you can start these exercises and which ones will work best for you. To prepare, make sure that your ball is the right size for you. When inflated and firm, it should allow you to sit with your hips and knees bent at about a 90-degree angle (like the letter L). How to do the exercises  Seated position on ball    7. Use this exercise to get used to moving on the ball and to find your best sitting position. 8. Sit comfortably on the ball with your feet about hip-width apart. If you feel unsteady, rest your hands on the ball near your hips. 9. As you do this exercise, try to keep your shoulders and upper body relaxed and still. 10. Using your stomach and back muscles to move your pelvis, roll the ball forward. This will round your back. 11. Still using your stomach and back muscles, roll the ball back. You will arch your back. 12. Repeat this rounding-arching motion a few times. 13. Stop in between the two positions, where your back is not rounded or arched. This is called your neutral position. Pelvic rotation    1.  Sit tall on the ball.  2. Slowly rotate your hips in a [place] pattern. Keep the movement focused at your hips. 3. Repeat, but [place] in the other direction. 4. Repeat 8 to 12 times. Postural sitting    1. Use this position to find a stable, relaxed posture on the ball. You can use this position as your starting point for other ball exercises. If you feel unsteady on the ball, start on a chair first.  2. Sit on a ball or chair, with your feet planted straight in front of you. 3. Imagine that a string at the top of your head is pulling you straight up. Think of yourself as 2 inches taller than you are.  4. Slightly tuck your chin. 5. Keep your shoulders back and relaxed. Knee extension    1. Sit tall on the ball with your feet planted in front of you, hip-width apart. As you do this exercise, avoid slumping your shoulders and arching your back. 2. Rest your hands on the ball near your hip or a steady object next to you. (If you feel very stable on the ball, rest your hands in your lap or at your side.)  3. Slowly straighten one leg at the knee. Slowly lower it back down. Repeat with the other leg. 4. Repeat this exercise 8 to 12 times. Roll-ups    1. Lie on your back with your knees bent, feet resting on the floor. 2. Lay the ball on your thighs. Rest your hands up high on the ball. 3. Raising your head and shoulder blades, roll the ball up your thighs. Exhale as you roll up. 4. If this is hard on your neck, gently support your lower head and upper neck with one hand. Don't use that hand to pull your head up. 5. Repeat 8 to 12 times. Ball curls    1. Lie on your back with your ankles resting on the ball, knees straight. 2. Use your legs to roll the exercise ball toward you. Allow your knees to bend and move closer to your chest.  3. Pause briefly, and then roll the ball to the starting position. Try to keep the ball rolling straight. You will feel the muscles in your lower belly working.   4. Repeat 8 to 12 ball    1. Stand facing away from a wall. Place your feet about shoulder-width apart. 2. Place the ball between your middle back and the wall. Move your feet out in front of you so they are about a foot in front of your hips. 3. Keep your arms at your sides, or put your hands on your hips. 4. Slowly squat down as if you are going to sit in a chair, rolling your back over the ball as you squat. The ball should move with you but stay pressed into the wall. 5. Be sure that your knees do not go in front of your toes as you squat. 6. Hold for 6 seconds. 7. Slowly rise to your standing position. 8. Repeat 8 to 12 times. Child's pose with ball    1. Kneeling upright with your back straight, rest your hands on the ball in front of you. 2. Breathe out as you bend at the hips, and roll the ball forward. Lower your chest toward the ground, and drop your hips back toward your heels. 3. To stretch your upper back and shoulders, hold this position for 15 to 30 seconds. 4. Repeat 2 to 4 times. Follow-up care is a key part of your treatment and safety. Be sure to make and go to all appointments, and call your doctor if you are having problems. It's also a good idea to know your test results and keep a list of the medicines you take. Where can you learn more? Go to [email or web address] (formerly [email or web address])[email or web address].[email or web address]. org and sign in to your Wowcracy account. Enter Y266 in the [facility] box to learn more about \"Therapeutic Ball: Back Exercises. \"     If you do not have an account, please click on the \"Sign Up Now\" link. Current as of: November 29, 2017  Content Version: 11.7  © 7894-5249 Targovax[employer]. Care instructions adapted under license by [facility] ([facility]). If you have questions about a medical condition or this instruction, always ask your healthcare professional. [address] any warranty or liability for your use of this information.

## 2018-09-17 PROCEDURE — 96372 THER/PROPH/DIAG INJ SC/IM: CPT | Performed by: INTERNAL MEDICINE

## 2018-09-17 RX ADMIN — METHYLPREDNISOLONE ACETATE 80 MG: 80 INJECTION, SUSPENSION INTRA-ARTICULAR; INTRALESIONAL; INTRAMUSCULAR; SOFT TISSUE at 09:34

## 2019-05-08 ENCOUNTER — EMPLOYEE WELLNESS (OUTPATIENT)
Dept: OTHER | Age: 34
End: 2019-05-08

## 2019-05-08 LAB
CHOLESTEROL, TOTAL: 167 MG/DL (ref 160–199)
GLUCOSE BLD-MCNC: 88 MG/DL (ref 74–109)
HDLC SERPL-MCNC: 39 MG/DL (ref 55–121)
LDL CHOLESTEROL CALCULATED: 114 MG/DL
TRIGL SERPL-MCNC: 69 MG/DL (ref 0–149)

## 2019-05-13 VITALS — BODY MASS INDEX: 25.84 KG/M2 | WEIGHT: 175 LBS

## 2019-06-10 ENCOUNTER — APPOINTMENT (OUTPATIENT)
Dept: CT IMAGING | Age: 34
End: 2019-06-10
Payer: COMMERCIAL

## 2019-06-10 ENCOUNTER — HOSPITAL ENCOUNTER (EMERGENCY)
Age: 34
Discharge: HOME | End: 2019-06-10
Attending: EMERGENCY MEDICINE
Payer: COMMERCIAL

## 2019-06-10 ENCOUNTER — OFFICE VISIT (OUTPATIENT)
Dept: URGENT CARE | Age: 34
End: 2019-06-10
Payer: COMMERCIAL

## 2019-06-10 VITALS
HEART RATE: 71 BPM | SYSTOLIC BLOOD PRESSURE: 122 MMHG | OXYGEN SATURATION: 98 % | RESPIRATION RATE: 16 BRPM | DIASTOLIC BLOOD PRESSURE: 70 MMHG | WEIGHT: 164.2 LBS | BODY MASS INDEX: 24.32 KG/M2 | HEIGHT: 69 IN | TEMPERATURE: 98.1 F

## 2019-06-10 VITALS
OXYGEN SATURATION: 97 % | SYSTOLIC BLOOD PRESSURE: 127 MMHG | HEART RATE: 83 BPM | TEMPERATURE: 98.9 F | RESPIRATION RATE: 18 BRPM | DIASTOLIC BLOOD PRESSURE: 85 MMHG

## 2019-06-10 DIAGNOSIS — S39.011A STRAIN OF ABDOMINAL MUSCLE, INITIAL ENCOUNTER: ICD-10-CM

## 2019-06-10 DIAGNOSIS — R10.84 GENERALIZED ABDOMINAL PAIN: Primary | ICD-10-CM

## 2019-06-10 DIAGNOSIS — R10.31 RIGHT LOWER QUADRANT ABDOMINAL PAIN: Primary | ICD-10-CM

## 2019-06-10 LAB
ALBUMIN SERPL-MCNC: 4.7 G/DL (ref 3.5–5.2)
ALP BLD-CCNC: 62 U/L (ref 40–130)
ALT SERPL-CCNC: 19 U/L (ref 5–41)
AMYLASE: 45 U/L (ref 28–100)
ANION GAP SERPL CALCULATED.3IONS-SCNC: 12 MMOL/L (ref 7–19)
APPEARANCE FLUID: CLEAR
AST SERPL-CCNC: 17 U/L (ref 5–40)
BASOPHILS ABSOLUTE: 0.1 K/UL (ref 0–0.2)
BASOPHILS RELATIVE PERCENT: 1.3 % (ref 0–1)
BILIRUB SERPL-MCNC: 0.8 MG/DL (ref 0.2–1.2)
BILIRUBIN URINE: NEGATIVE
BILIRUBIN, POC: NEGATIVE
BLOOD URINE, POC: NEGATIVE
BLOOD, URINE: NEGATIVE
BUN BLDV-MCNC: 8 MG/DL (ref 6–20)
CALCIUM SERPL-MCNC: 9.6 MG/DL (ref 8.6–10)
CHLORIDE BLD-SCNC: 102 MMOL/L (ref 98–111)
CLARITY, POC: CLEAR
CLARITY: CLEAR
CO2: 25 MMOL/L (ref 22–29)
COLOR, POC: YELLOW
COLOR: YELLOW
CREAT SERPL-MCNC: 0.8 MG/DL (ref 0.5–1.2)
EOSINOPHILS ABSOLUTE: 0.1 K/UL (ref 0–0.6)
EOSINOPHILS RELATIVE PERCENT: 1.6 % (ref 0–5)
GFR NON-AFRICAN AMERICAN: >60
GLUCOSE BLD-MCNC: 87 MG/DL (ref 74–109)
GLUCOSE URINE, POC: NEGATIVE
GLUCOSE URINE: NEGATIVE MG/DL
HCT VFR BLD CALC: 45.1 % (ref 42–52)
HEMOGLOBIN: 15.4 G/DL (ref 14–18)
KETONES, POC: NEGATIVE
KETONES, URINE: 15 MG/DL
LACTIC ACID: 0.8 MMOL/L (ref 0.5–1.9)
LEUKOCYTE EST, POC: NEGATIVE
LEUKOCYTE ESTERASE, URINE: NEGATIVE
LIPASE: 19 U/L (ref 13–60)
LYMPHOCYTES ABSOLUTE: 1.8 K/UL (ref 1.1–4.5)
LYMPHOCYTES RELATIVE PERCENT: 49.1 % (ref 20–40)
MCH RBC QN AUTO: 29.9 PG (ref 27–31)
MCHC RBC AUTO-ENTMCNC: 34.1 G/DL (ref 33–37)
MCV RBC AUTO: 87.6 FL (ref 80–94)
MONOCYTES ABSOLUTE: 0.3 K/UL (ref 0–0.9)
MONOCYTES RELATIVE PERCENT: 9.2 % (ref 0–10)
NEUTROPHILS ABSOLUTE: 1.4 K/UL (ref 1.5–7.5)
NEUTROPHILS RELATIVE PERCENT: 38.5 % (ref 50–65)
NITRITE, POC: NEGATIVE
NITRITE, URINE: NEGATIVE
PDW BLD-RTO: 12.6 % (ref 11.5–14.5)
PH UA: 6.5 (ref 5–8)
PH, POC: 5.5
PLATELET # BLD: 269 K/UL (ref 130–400)
PMV BLD AUTO: 10.2 FL (ref 9.4–12.4)
POTASSIUM SERPL-SCNC: 3.8 MMOL/L (ref 3.5–5)
PROTEIN UA: NEGATIVE MG/DL
PROTEIN, POC: NEGATIVE
RBC # BLD: 5.15 M/UL (ref 4.7–6.1)
SODIUM BLD-SCNC: 139 MMOL/L (ref 136–145)
SPECIFIC GRAVITY UA: 1.02 (ref 1–1.03)
SPECIFIC GRAVITY, POC: <=1.005
TOTAL PROTEIN: 6.9 G/DL (ref 6.6–8.7)
URINE REFLEX TO CULTURE: ABNORMAL
UROBILINOGEN, POC: 0.2
UROBILINOGEN, URINE: 0.2 E.U./DL
WBC # BLD: 3.7 K/UL (ref 4.8–10.8)

## 2019-06-10 PROCEDURE — 83690 ASSAY OF LIPASE: CPT

## 2019-06-10 PROCEDURE — 81002 URINALYSIS NONAUTO W/O SCOPE: CPT | Performed by: NURSE PRACTITIONER

## 2019-06-10 PROCEDURE — 85025 COMPLETE CBC W/AUTO DIFF WBC: CPT

## 2019-06-10 PROCEDURE — 36415 COLL VENOUS BLD VENIPUNCTURE: CPT

## 2019-06-10 PROCEDURE — 82150 ASSAY OF AMYLASE: CPT

## 2019-06-10 PROCEDURE — 81003 URINALYSIS AUTO W/O SCOPE: CPT

## 2019-06-10 PROCEDURE — 6360000004 HC RX CONTRAST MEDICATION: Performed by: EMERGENCY MEDICINE

## 2019-06-10 PROCEDURE — 83605 ASSAY OF LACTIC ACID: CPT

## 2019-06-10 PROCEDURE — 99284 EMERGENCY DEPT VISIT MOD MDM: CPT | Performed by: EMERGENCY MEDICINE

## 2019-06-10 PROCEDURE — 99284 EMERGENCY DEPT VISIT MOD MDM: CPT

## 2019-06-10 PROCEDURE — 74177 CT ABD & PELVIS W/CONTRAST: CPT

## 2019-06-10 PROCEDURE — 80053 COMPREHEN METABOLIC PANEL: CPT

## 2019-06-10 RX ADMIN — IOPAMIDOL 90 ML: 755 INJECTION, SOLUTION INTRAVENOUS at 21:44

## 2019-06-10 ASSESSMENT — PAIN DESCRIPTION - PAIN TYPE: TYPE: ACUTE PAIN

## 2019-06-10 ASSESSMENT — ENCOUNTER SYMPTOMS
BACK PAIN: 0
ABDOMINAL PAIN: 1
SINUS PRESSURE: 0
ALLERGIC/IMMUNOLOGIC NEGATIVE: 1
BELCHING: 0
CONSTIPATION: 0
EYES NEGATIVE: 1
SORE THROAT: 0
DIARRHEA: 0
COUGH: 0
VOMITING: 0
SHORTNESS OF BREATH: 0
NAUSEA: 1

## 2019-06-10 ASSESSMENT — PAIN DESCRIPTION - LOCATION: LOCATION: ABDOMEN

## 2019-06-10 ASSESSMENT — PAIN SCALES - GENERAL: PAINLEVEL_OUTOF10: 2

## 2019-06-10 NOTE — PROGRESS NOTES
[address] [facility] C[facility]  [address] [facility] [place] [address]  Dept: [phone]  Loc: [phone]    Edie Stratton is a 35 y.o. male who presents today for his medical conditions/complaintsas noted below. Edie Stratton is c/o of Abdominal Pain (RLQ)        HPI:     Abdominal Pain   This is a new problem. The current episode started yesterday. The onset quality is sudden. The problem occurs intermittently (Pain worse with movement). The problem has been gradually worsening. The pain is located in the RLQ. The pain is at a severity of 5/10 (With movement). The pain is moderate. The quality of the pain is sharp. The abdominal pain does not radiate. Associated symptoms include nausea. Pertinent negatives include no arthralgias, belching, constipation, diarrhea, dysuria, fever, frequency, headaches, hematuria, myalgias or vomiting. The pain is aggravated by certain positions. The pain is relieved by being still. Treatments tried: Milk of magnesia yesterday. The treatment provided no relief. There is no history of gallstones, GERD, irritable bowel syndrome or ulcerative colitis. Bella James tells me if he moves around much he begins having worsening pain but if he lies still the pain is about a 1-2/10  Past Medical History:   Diagnosis Date    Anxiety     Chronic back pain     Gynecomastia 2003    right side    Insomnia     Perennial allergic rhinitis 8/28/2017     Past Surgical History:   Procedure Laterality Date    CYST REMOVAL      MASTECTOMY Right 2003    due to gynecomastia       Family History   Problem Relation Age of Onset    Heart Disease Paternal Grandfather     Prostate Cancer Father     Diabetes Daughter         Type I       Social History     Tobacco Use    Smoking status: Never Smoker    Smokeless tobacco: Never Used   Substance Use Topics    Alcohol use:  Yes     Alcohol/week: 0.0 oz     Comment: rarely      Current Outpatient Medications Instructions   Discussed with pt the exam findings could represent acute appendicitis and he needs to have further exam and imaging and we are unable to do a CT scan to rule out appendicitis  Recommend he proceed to ER for further evaluation, he declines ambulance and is driving over to ER without hesitation. He left our office in stable condition       Patient given educational materials- see patient instructions. Discussed use, benefit, and side effects of prescribedmedications. All patient questions answered. Pt voiced understanding.        Electronically signed by MITZI Lane CNP on 6/10/2019 at 4:13 PM

## 2019-06-10 NOTE — PATIENT INSTRUCTIONS
Discussed with pt the exam findings could represent acute appendicitis and he needs to have further exam and imaging and we are unable to do a CT scan to rule out appendicitis  Recommend he proceed to ER for further evaluation, he declines ambulance and is driving over to ER without hesitation.   He left our office in stable condition

## 2019-06-11 NOTE — ED PROVIDER NOTES
[facility]  eMERGENCY dEPARTMENT eNCOUnter      Pt Name: Jacky Thorpe  MRN: [id number]  [place] 1985  Date of evaluation: 6/10/2019  Provider: Cori Harry MD    CHIEF COMPLAINT       Chief Complaint   Patient presents with    Abdominal Pain     since yesterday morning, RLQ         HISTORY OF PRESENT ILLNESS   (Location/Symptom, Timing/Onset,Context/Setting, Quality, Duration, Modifying Factors, Severity)  Note limiting factors. Jacky Thorpe is a 35 y.o. male who presents to the emergency department with complaints of right lower quadrant abdominal pain patient reports that this pain began yesterday morning and has been fairly constant in nature. He describes the pain is sharp and nature and without radiation. States it is located over the right lower portion of his abdomen. Has been associated with some nausea however denies vomiting. Denies fevers, chills. Denies symptoms of hematuria or dysuria. No prior history of similar symptoms. He denies any diarrhea. There have really been no relieving factors for the symptoms itself. Denies any radiation of the pain. HPI    NursingNotes were reviewed. REVIEW OF SYSTEMS    (2-9 systems for level 4, 10 or more for level 5)     Review of Systems   Constitutional: Negative for chills and fever. Respiratory: Negative for shortness of breath. Cardiovascular: Negative for chest pain. Gastrointestinal: Positive for abdominal pain. Negative for nausea and vomiting. Neurological: Negative for dizziness and syncope. All other systems reviewed and are negative.            PAST MEDICALHISTORY     Past Medical History:   Diagnosis Date    Anxiety     Chronic back pain     Gynecomastia 2003    right side    Insomnia     Perennial allergic rhinitis 8/28/2017         SURGICAL HISTORY       Past Surgical History:   Procedure Laterality Date    CYST REMOVAL      MASTECTOMY Right 2003    due to gynecomastia         CURRENT MEDICATIONS Discharge Medication List as of 6/10/2019 11:14 PM      CONTINUE these medications which have NOT CHANGED    Details   Fexofenadine HCl (ALLEGRA PO) Take by mouth      albuterol sulfate HFA (PROAIR HFA) 108 (90 Base) MCG/ACT inhaler Inhale 2 puffs into the lungs every 4 hours as needed for Wheezing or Shortness of Breath, Disp-2 Inhaler, R-3Normal      fluticasone (FLONASE) 50 MCG/ACT nasal spray 1 spray by Nasal route daily, Disp-1 Bottle, R-3Normal      Multiple Vitamins-Minerals (THERAPEUTIC MULTIVITAMIN-MINERALS) tablet Take 1 tablet by mouth daily             ALLERGIES     Amoxicillin; Other; and Pcn [penicillins]    FAMILY HISTORY       Family History   Problem Relation Age of Onset    Heart Disease Paternal Grandfather     Prostate Cancer Father     Diabetes Daughter         Type I          SOCIAL HISTORY       Social History     Socioeconomic History    Marital status:      Spouse name: None    Number of children: 1    Years of education: None    Highest education level: None   Occupational History    Occupation:    Social Needs    Financial resource strain: None    Food insecurity:     Worry: None     Inability: None    Transportation needs:     Medical: None     Non-medical: None   Tobacco Use    Smoking status: Never Smoker    Smokeless tobacco: Never Used   Substance and Sexual Activity    Alcohol use:  Yes     Alcohol/week: 0.0 standard drinks     Comment: rarely    Drug use: No    Sexual activity: Yes     Partners: Female   Lifestyle    Physical activity:     Days per week: None     Minutes per session: None    Stress: None   Relationships    Social connections:     Talks on phone: None     Gets together: None     Attends Scientology service: None     Active member of club or organization: None     Attends meetings of clubs or organizations: None     Relationship status: None    Intimate partner violence:     Fear of current or ex partner: None     Emotionally abused: None     Physically abused: None     Forced sexual activity: None   Other Topics Concern    None   Social History Narrative    None       SCREENINGS             PHYSICAL EXAM    (up to 7 for level 4, 8 or more for level 5)     ED Triage Vitals [06/10/19 1712]   BP Temp Temp src Pulse Resp SpO2 Height Weight   127/85 98.9 °F (37.2 °C) -- 83 18 97 % -- --       Physical Exam   Constitutional: He is oriented to person, place, and time. He is cooperative. HENT:   Head: Atraumatic. Mouth/Throat: Mucous membranes are not dry. No posterior oropharyngeal erythema. Eyes: Pupils are equal, round, and reactive to light. No scleral icterus. Neck: No tracheal deviation present. Cardiovascular: Normal rate, regular rhythm, normal heart sounds and intact distal pulses. No murmur heard. Pulmonary/Chest: Effort normal and breath sounds normal. No stridor. No respiratory distress. Abdominal: Soft. He exhibits no distension. There is tenderness (RLQ). There is no guarding. Musculoskeletal: He exhibits no edema. Neurological: He is alert and oriented to person, place, and time. He has normal strength. Skin: Capillary refill takes less than 2 seconds. No rash noted. No pallor. Nursing note and vitals reviewed. DIAGNOSTIC RESULTS     RADIOLOGY:  Non-plain film images such as CT, Ultrasound and MRI are read by the radiologist. Plain radiographic images are visualized and preliminarily interpreted bythe emergency physician with the below findings:    CT ABDOMEN & PELVIS With Contrast:    Compared to 4/6/18. The appendix is normal.    No evidence for bowel related inflammatory changes. No evidence for significant bowel loop dilation to suggest an obstructive process. No free intraperitoneal fluid or free intraperitoneal gas. The intra-abdominal organs are unremarkable. CT scan interpretation has been performed by [email or web address] [place] [facility] on a preliminary basis.  Medical decision making along with patient disposition has occurred utilizing the above interpretation available to me at the time of patient's ED encounter. CT ABDOMEN PELVIS W IV CONTRAST Additional Contrast? None   Final Result   No acute abnormality of the abdomen are pelvis. Appendix is normal.   The above study was initially reviewed and reported by [facility]. I do   not find any discrepancies. Signed by Dr Alice Valadez on 6/11/2019 7:29 AM              LABS:  Labs Reviewed   CBC WITH AUTO DIFFERENTIAL - Abnormal; Notable for the following components:       Result Value    WBC 3.7 (*)     Neutrophils % 38.5 (*)     Lymphocytes % 49.1 (*)     Basophils % 1.3 (*)     Neutrophils # 1.4 (*)     All other components within normal limits   URINE RT REFLEX TO CULTURE - Abnormal; Notable for the following components:    Ketones, Urine 15 (*)     All other components within normal limits   COMPREHENSIVE METABOLIC PANEL   LIPASE   AMYLASE   LACTIC ACID, PLASMA       All other labs were within normal range or not returned as of this dictation. EMERGENCY DEPARTMENT COURSE and DIFFERENTIAL DIAGNOSIS/MDM:   Vitals:    Vitals:    06/10/19 1712   BP: 127/85   Pulse: 83   Resp: 18   Temp: 98.9 °F (37.2 °C)   SpO2: 97%       MDM    PROCEDURES:  Unless otherwise noted below, none     Procedures    FINAL IMPRESSION      1. Generalized abdominal pain    2.  Strain of abdominal muscle, initial encounter          DISPOSITION/PLAN   DISPOSITION Decision To Discharge 06/10/2019 10:45:00 PM      PATIENT REFERRED TO:  Jaylen Watkins MD  [address]  [phone]            DISCHARGE MEDICATIONS:  Discharge Medication List as of 6/10/2019 11:14 PM             (Please note that portions of this note were completed with a voice recognition program.  Efforts were made to edit thedictations but occasionally words are mis-transcribed.)    Sue Tran MD (electronically signed)  Attending Emergency Physician         Michael Scales Isaura Devries MD  07/19/19 [id number]

## 2019-07-19 ASSESSMENT — ENCOUNTER SYMPTOMS
VOMITING: 0
SHORTNESS OF BREATH: 0
ABDOMINAL PAIN: 1
NAUSEA: 0

## 2019-08-08 ENCOUNTER — HOSPITAL ENCOUNTER (OUTPATIENT)
Dept: GENERAL RADIOLOGY | Age: 34
Discharge: HOME | End: 2019-08-08
Payer: COMMERCIAL

## 2019-08-08 ENCOUNTER — OFFICE VISIT (OUTPATIENT)
Dept: URGENT CARE | Age: 34
End: 2019-08-08
Payer: COMMERCIAL

## 2019-08-08 VITALS
SYSTOLIC BLOOD PRESSURE: 115 MMHG | DIASTOLIC BLOOD PRESSURE: 79 MMHG | WEIGHT: 188 LBS | TEMPERATURE: 97.8 F | HEART RATE: 88 BPM | BODY MASS INDEX: 27.85 KG/M2 | RESPIRATION RATE: 18 BRPM | HEIGHT: 69 IN | OXYGEN SATURATION: 95 %

## 2019-08-08 DIAGNOSIS — R05.9 COUGH: ICD-10-CM

## 2019-08-08 DIAGNOSIS — R06.2 WHEEZING: ICD-10-CM

## 2019-08-08 DIAGNOSIS — J45.901 MILD ASTHMA WITH EXACERBATION, UNSPECIFIED WHETHER PERSISTENT: ICD-10-CM

## 2019-08-08 DIAGNOSIS — R05.9 COUGH: Primary | ICD-10-CM

## 2019-08-08 PROCEDURE — 99214 OFFICE O/P EST MOD 30 MIN: CPT | Performed by: NURSE PRACTITIONER

## 2019-08-08 PROCEDURE — 71046 X-RAY EXAM CHEST 2 VIEWS: CPT

## 2019-08-08 PROCEDURE — 96372 THER/PROPH/DIAG INJ SC/IM: CPT | Performed by: NURSE PRACTITIONER

## 2019-08-08 RX ORDER — AZITHROMYCIN 250 MG/1
250 TABLET, FILM COATED ORAL SEE ADMIN INSTRUCTIONS
Qty: 6 TABLET | Refills: 0 | Status: SHIPPED | OUTPATIENT
Start: 2019-08-08 | End: 2019-08-13

## 2019-08-08 RX ORDER — PSEUDOEPHEDRINE HYDROCHLORIDE 30 MG/1
30 TABLET ORAL EVERY 4 HOURS PRN
COMMUNITY
End: 2019-09-18 | Stop reason: ALTCHOICE

## 2019-08-08 RX ORDER — METHYLPREDNISOLONE 4 MG/1
TABLET ORAL
Qty: 1 KIT | Refills: 0 | Status: SHIPPED | OUTPATIENT
Start: 2019-08-08 | End: 2019-09-18 | Stop reason: ALTCHOICE

## 2019-08-08 RX ORDER — METHYLPREDNISOLONE ACETATE 80 MG/ML
80 INJECTION, SUSPENSION INTRA-ARTICULAR; INTRALESIONAL; INTRAMUSCULAR; SOFT TISSUE ONCE
Status: COMPLETED | OUTPATIENT
Start: 2019-08-08 | End: 2019-08-08

## 2019-08-08 RX ADMIN — METHYLPREDNISOLONE ACETATE 80 MG: 80 INJECTION, SUSPENSION INTRA-ARTICULAR; INTRALESIONAL; INTRAMUSCULAR; SOFT TISSUE at 17:51

## 2019-08-08 ASSESSMENT — ENCOUNTER SYMPTOMS
BACK PAIN: 0
SHORTNESS OF BREATH: 0
SORE THROAT: 1
DIARRHEA: 0
VOMITING: 0
EYES NEGATIVE: 1
NAUSEA: 0
WHEEZING: 1
ABDOMINAL PAIN: 0
SINUS PRESSURE: 0
COUGH: 1

## 2019-09-18 ENCOUNTER — OFFICE VISIT (OUTPATIENT)
Dept: FAMILY MEDICINE CLINIC | Age: 34
End: 2019-09-18
Payer: COMMERCIAL

## 2019-09-18 VITALS
OXYGEN SATURATION: 99 % | DIASTOLIC BLOOD PRESSURE: 76 MMHG | WEIGHT: 189 LBS | HEART RATE: 87 BPM | HEIGHT: 69 IN | SYSTOLIC BLOOD PRESSURE: 118 MMHG | BODY MASS INDEX: 27.99 KG/M2 | TEMPERATURE: 98.1 F

## 2019-09-18 DIAGNOSIS — D70.8 OTHER NEUTROPENIA (HCC): ICD-10-CM

## 2019-09-18 DIAGNOSIS — Z00.00 ANNUAL PHYSICAL EXAM: Primary | ICD-10-CM

## 2019-09-18 DIAGNOSIS — J45.20 ASTHMA, MILD INTERMITTENT, WELL-CONTROLLED: ICD-10-CM

## 2019-09-18 DIAGNOSIS — E66.3 OVERWEIGHT: ICD-10-CM

## 2019-09-18 DIAGNOSIS — F51.01 PRIMARY INSOMNIA: ICD-10-CM

## 2019-09-18 PROBLEM — M54.50 BILATERAL LOW BACK PAIN WITHOUT SCIATICA: Status: RESOLVED | Noted: 2018-09-14 | Resolved: 2019-09-18

## 2019-09-18 PROCEDURE — 99395 PREV VISIT EST AGE 18-39: CPT | Performed by: INTERNAL MEDICINE

## 2019-09-18 ASSESSMENT — ENCOUNTER SYMPTOMS
RHINORRHEA: 0
CHEST TIGHTNESS: 0
BLOOD IN STOOL: 0
EYE DISCHARGE: 0
VOICE CHANGE: 0
VOMITING: 0
SORE THROAT: 0
EYE REDNESS: 0
SINUS PRESSURE: 0
DIARRHEA: 0
ABDOMINAL PAIN: 0
COUGH: 0
SHORTNESS OF BREATH: 0
COLOR CHANGE: 0
WHEEZING: 0
EYE PAIN: 0

## 2019-09-18 ASSESSMENT — PATIENT HEALTH QUESTIONNAIRE - PHQ9
SUM OF ALL RESPONSES TO PHQ QUESTIONS 1-9: 0
2. FEELING DOWN, DEPRESSED OR HOPELESS: 0
1. LITTLE INTEREST OR PLEASURE IN DOING THINGS: 0
SUM OF ALL RESPONSES TO PHQ9 QUESTIONS 1 & 2: 0
SUM OF ALL RESPONSES TO PHQ QUESTIONS 1-9: 0

## 2019-09-18 NOTE — PATIENT INSTRUCTIONS
Patient Education        Well Visit, Ages 25 to 48: Care Instructions  Your Care Instructions    Physical exams can help you stay healthy. Your doctor has checked your overall health and may have suggested ways to take good care of yourself. He or she also may have recommended tests. At home, you can help prevent illness with healthy eating, regular exercise, and other steps. Follow-up care is a key part of your treatment and safety. Be sure to make and go to all appointments, and call your doctor if you are having problems. It's also a good idea to know your test results and keep a list of the medicines you take. How can you care for yourself at home? · Reach and stay at a healthy weight. This will lower your risk for many problems, such as obesity, diabetes, heart disease, and high blood pressure. · Get at least 30 minutes of physical activity on most days of the week. Walking is a good choice. You also may want to do other activities, such as running, swimming, cycling, or playing tennis or team sports. Discuss any changes in your exercise program with your doctor. · Do not smoke or allow others to smoke around you. If you need help quitting, talk to your doctor about stop-smoking programs and medicines. These can increase your chances of quitting for good. · Talk to your doctor about whether you have any risk factors for sexually transmitted infections (STIs). Having one sex partner (who does not have STIs and does not have sex with anyone else) is a good way to avoid these infections. · Use birth control if you do not want to have children at this time. Talk with your doctor about the choices available and what might be best for you. · Protect your skin from too much sun. When you're outdoors from 10 a.m. to 4 p.m., stay in the shade or cover up with clothing and a hat with a wide brim. Wear sunglasses that block UV rays.  Even when it's cloudy, put broad-spectrum sunscreen (SPF 30 or higher) on any contact your doctor if:    · You do not get better as expected. Where can you learn more? Go to https://[email or web address].com. org and sign in to your [employer] account. Enter S446 in the [facility] box to learn more about \"Neutropenia: Care Instructions. \"     If you do not have an account, please click on the \"Sign Up Now\" link. Current as of: March 28, 2019  Content Version: 12.1  © 0168-0393 [employer]. Care instructions adapted under license by [facility] [facility] ([facility]). If you have questions about a medical condition or this instruction, always ask your healthcare professional. [address] any warranty or liability for your use of this information. Patient Education        Testicular Self-Exam: Care Instructions  Your Care Instructions    A self-exam is a way for you to check for cancer of the testicles. Although testicular cancer is rare, it is one of the most common tumors in men younger than 28. Many testicular cancers are found during self-exam. In the early stages of testicular cancer, the lump, which may be about the size of a pea, usually is not painful. Testicular cancer, especially if treated early, is very often cured. By doing this self-exam regularly, you can learn the normal size, shape, and weight of your testicles. This allows you to note any changes. Follow-up care is a key part of your treatment and safety. Be sure to make and go to all appointments, and call your doctor if you are having problems. It's also a good idea to know your test results and keep a list of the medicines you take. How can you care for yourself at home? · The exam is best done during or after a bath or shower--when the scrotum, the skin sac that holds the testicles, is relaxed. · Stand and place your right leg on a raised surface about chair height. Then gently feel your scrotum until you find the right testicle.   · Roll the testicle gently but firmly between

## 2019-09-18 NOTE — PROGRESS NOTES
Drug use: No       Family History   Problem Relation Age of Onset    Heart Disease Paternal Grandfather     Prostate Cancer Father     Diabetes Daughter         Type I       /76   Pulse 87   Temp 98.1 °F (36.7 °C)   Ht 5' 9\" (1.753 m)   Wt 189 lb (85.7 kg)   SpO2 99%   BMI 27.91 kg/m²     Physical Exam   Constitutional: He is oriented to person, place, and time. He appears well-developed and well-nourished. HENT:   Head: Normocephalic and atraumatic. Right Ear: External ear normal.   Left Ear: External ear normal.   Nose: Nose normal.   Mouth/Throat: Oropharynx is clear and moist.   Eyes: Pupils are equal, round, and reactive to light. Conjunctivae and EOM are normal. No scleral icterus. Neck: Normal range of motion. Neck supple. No JVD present. No thyromegaly present. Cardiovascular: Normal rate, regular rhythm, normal heart sounds and intact distal pulses. Exam reveals no gallop and no friction rub. No murmur heard. Pulses:       Carotid pulses are 2+ on the right side, and 2+ on the left side. Posterior tibial pulses are 2+ on the right side, and 2+ on the left side. Pulmonary/Chest: Effort normal and breath sounds normal.   Abdominal: Soft. Bowel sounds are normal. He exhibits no distension and no mass. There is no hepatosplenomegaly. There is no tenderness. There is no rebound and no guarding. Genitourinary:   Genitourinary Comments: deferred   Musculoskeletal: Normal range of motion. He exhibits no edema. Thoracic back: He exhibits spasm. He exhibits no tenderness and no bony tenderness. Lumbar back: He exhibits spasm. He exhibits no tenderness and no bony tenderness. Lymphadenopathy:     He has no cervical adenopathy. Right: No supraclavicular adenopathy present. Left: No supraclavicular adenopathy present. Neurological: He is alert and oriented to person, place, and time. He has normal reflexes. He displays no tremor.  No cranial nerve you have questions about a medical condition or this instruction, always ask your healthcare professional. [address] any warranty or liability for your use of this information. Patient Education        Neutropenia: Care Instructions  Your Care Instructions  Neutropenia (say \"rmd-klyt-GAG-nee-uh\") means that your blood has too few neutrophils. These are white blood cells that help protect the body from infection. They do this by killing bacteria. Neutropenia can be caused by some types of infection. It also can be caused by immune system conditions such as HIV or lupus, a lack of vitamin L94 or folic acid, or an enlarged spleen. Some medicines can cause it too. It is most often caused by treatments for certain health problems, such as chemotherapy and radiation treatment for cancer. Mild neutropenia usually causes no symptoms. But when it's severe, it increases the risk of infection of your skin and organs. That's because your body can't fight off germs as well as it should. Follow-up care is a key part of your treatment and safety. Be sure to make and go to all appointments, and call your doctor if you are having problems. It's also a good idea to know your test results and keep a list of the medicines you take. How can you care for yourself at home? · Take your medicines exactly as prescribed. Call your doctor if you have any problems with your medicine. · Eat a healthy, balanced diet. Eat foods with a lot of fiber. This helps to prevent constipation. Prevent infections  · Take your temperature several times a day, as your doctor suggests. Keep a written record of your temperature readings. Fever is a common symptom of infection. And it may be the only symptom. · Use a soft toothbrush. Do not floss your teeth. Talk with your doctor about other steps to prevent infections in your mouth.   · Wash your hands often with soap and water, especially before you eat and after you use the bathroom. · If you are a woman, use sanitary napkins (pads) instead of tampons. Do not douche. · Do not use rectal thermometers or suppositories. · Avoid tasks that might expose you to germs, such as disposing of pet feces or urine. · Avoid crowds of people and anyone who might have an infection or an illness such as a cold or the flu. You may need to avoid people who have recently had certain kinds of vaccinations. · Even small injuries can get infected. Take steps to prevent cuts, burns, and sunburns. · If you have severe neutropenia, your doctor may advise you to avoid fresh fruits, vegetables, and flowers. When should you call for help? Call 911 anytime you think you may need emergency care. For example, call if:    · You have severe shortness of breath.     · You passed out (lost consciousness).    Call your doctor now or seek immediate medical care if:    · You have signs of infection, such as:  ? Increased pain, swelling, warmth, or redness of your skin. ? Red streaks leading from a wound. ? Pus draining from a wound. ? A fever.    Watch closely for changes in your health, and be sure to contact your doctor if:    · You do not get better as expected. Where can you learn more? Go to https://Majitek.123people. org and sign in to your pfwaterworks account. Enter B788 in the [facility] box to learn more about \"Neutropenia: Care Instructions. \"     If you do not have an account, please click on the \"Sign Up Now\" link. Current as of: March 28, 2019  Content Version: 12.1  © 3658-2081 [employer]. Care instructions adapted under license by [facility] ([facility]). If you have questions about a medical condition or this instruction, always ask your healthcare professional. [place] [address] any warranty or liability for your use of this information.          Patient Education        Testicular Self-Exam: Care Instructions  Your Care Instructions    A self-exam is a way for you to check for cancer of the testicles. Although testicular cancer is rare, it is one of the most common tumors in men younger than 28. Many testicular cancers are found during self-exam. In the early stages of testicular cancer, the lump, which may be about the size of a pea, usually is not painful. Testicular cancer, especially if treated early, is very often cured. By doing this self-exam regularly, you can learn the normal size, shape, and weight of your testicles. This allows you to note any changes. Follow-up care is a key part of your treatment and safety. Be sure to make and go to all appointments, and call your doctor if you are having problems. It's also a good idea to know your test results and keep a list of the medicines you take. How can you care for yourself at home? · The exam is best done during or after a bath or shower--when the scrotum, the skin sac that holds the testicles, is relaxed. · Stand and place your right leg on a raised surface about chair height. Then gently feel your scrotum until you find the right testicle. · Roll the testicle gently but firmly between your thumb and fingers of both hands. Carefully feel the surface for lumps. Feel for any change in the size, shape, or texture of the testicle. The testicle should feel round and smooth. It is normal for one testicle to be slightly larger than the other one. · Repeat this for the left side. Feel the entire surface of both testicles. · You may feel the epididymis, the soft tube behind each testicle. Become familiar with this structure so that you won't mistake it for a lump. When should you call for help? Call your doctor now or seek immediate medical care if:    · You have pain in a testicle.    Watch closely for changes in your health, and be sure to contact your doctor if:    · You notice a change in a testicle.     · You notice a lump in a testicle. Where can you learn more?   Go to

## 2019-09-19 DIAGNOSIS — D70.8 OTHER NEUTROPENIA (HCC): ICD-10-CM

## 2019-09-19 DIAGNOSIS — F51.01 IDIOPATHIC INSOMNIA: Primary | ICD-10-CM

## 2019-09-19 LAB
BASOPHILS ABSOLUTE: 0 K/UL (ref 0–0.2)
BASOPHILS RELATIVE PERCENT: 0.9 % (ref 0–1)
EOSINOPHILS ABSOLUTE: 0 K/UL (ref 0–0.6)
EOSINOPHILS RELATIVE PERCENT: 0.7 % (ref 0–5)
HCT VFR BLD CALC: 45.1 % (ref 42–52)
HEMOGLOBIN: 15.5 G/DL (ref 14–18)
IMMATURE GRANULOCYTES #: 0 K/UL
LYMPHOCYTES ABSOLUTE: 2 K/UL (ref 1.1–4.5)
LYMPHOCYTES RELATIVE PERCENT: 46.2 % (ref 20–40)
MCH RBC QN AUTO: 30.2 PG (ref 27–31)
MCHC RBC AUTO-ENTMCNC: 34.4 G/DL (ref 33–37)
MCV RBC AUTO: 87.7 FL (ref 80–94)
MONOCYTES ABSOLUTE: 0.3 K/UL (ref 0–0.9)
MONOCYTES RELATIVE PERCENT: 7.5 % (ref 0–10)
NEUTROPHILS ABSOLUTE: 1.9 K/UL (ref 1.5–7.5)
NEUTROPHILS RELATIVE PERCENT: 44.5 % (ref 50–65)
PDW BLD-RTO: 12.8 % (ref 11.5–14.5)
PLATELET # BLD: 282 K/UL (ref 130–400)
PMV BLD AUTO: 10 FL (ref 9.4–12.4)
RBC # BLD: 5.14 M/UL (ref 4.7–6.1)
WBC # BLD: 4.3 K/UL (ref 4.8–10.8)

## 2020-03-05 ENCOUNTER — EMPLOYEE WELLNESS (OUTPATIENT)
Dept: OTHER | Age: 35
End: 2020-03-05

## 2020-03-05 LAB
CHOLESTEROL, TOTAL: 167 MG/DL (ref 160–199)
GLUCOSE BLD-MCNC: 96 MG/DL (ref 74–109)
HDLC SERPL-MCNC: 47 MG/DL (ref 55–121)
LDL CHOLESTEROL CALCULATED: 103 MG/DL
TRIGL SERPL-MCNC: 83 MG/DL (ref 0–149)

## 2020-03-08 LAB
3-OH-COTININE: <2 NG/ML
COTININE: <2 NG/ML
NICOTINE: <2 NG/ML

## 2020-04-21 DIAGNOSIS — F51.01 IDIOPATHIC INSOMNIA: ICD-10-CM

## 2020-04-21 LAB
BASOPHILS ABSOLUTE: 0 K/UL (ref 0–0.2)
BASOPHILS RELATIVE PERCENT: 1.1 % (ref 0–1)
EOSINOPHILS ABSOLUTE: 0.1 K/UL (ref 0–0.6)
EOSINOPHILS RELATIVE PERCENT: 1.4 % (ref 0–5)
HCT VFR BLD CALC: 46.8 % (ref 42–52)
HEMOGLOBIN: 16 G/DL (ref 14–18)
IMMATURE GRANULOCYTES #: 0 K/UL
LYMPHOCYTES ABSOLUTE: 1.7 K/UL (ref 1.1–4.5)
LYMPHOCYTES RELATIVE PERCENT: 46.1 % (ref 20–40)
MCH RBC QN AUTO: 29.6 PG (ref 27–31)
MCHC RBC AUTO-ENTMCNC: 34.2 G/DL (ref 33–37)
MCV RBC AUTO: 86.7 FL (ref 80–94)
MONOCYTES ABSOLUTE: 0.3 K/UL (ref 0–0.9)
MONOCYTES RELATIVE PERCENT: 7.8 % (ref 0–10)
NEUTROPHILS ABSOLUTE: 1.6 K/UL (ref 1.5–7.5)
NEUTROPHILS RELATIVE PERCENT: 43.6 % (ref 50–65)
PDW BLD-RTO: 12.6 % (ref 11.5–14.5)
PLATELET # BLD: 281 K/UL (ref 130–400)
PMV BLD AUTO: 10.3 FL (ref 9.4–12.4)
RBC # BLD: 5.4 M/UL (ref 4.7–6.1)
WBC # BLD: 3.6 K/UL (ref 4.8–10.8)

## 2020-04-30 ENCOUNTER — TELEMEDICINE (OUTPATIENT)
Dept: FAMILY MEDICINE CLINIC | Age: 35
End: 2020-04-30
Payer: COMMERCIAL

## 2020-04-30 PROCEDURE — 99214 OFFICE O/P EST MOD 30 MIN: CPT | Performed by: INTERNAL MEDICINE

## 2020-04-30 ASSESSMENT — ENCOUNTER SYMPTOMS
DIARRHEA: 0
EYE DISCHARGE: 0
EYE REDNESS: 0
BLOOD IN STOOL: 0
EYE PAIN: 0
VOICE CHANGE: 0
ABDOMINAL PAIN: 0
WHEEZING: 0
VOMITING: 0
SHORTNESS OF BREATH: 0
RHINORRHEA: 0
COUGH: 0
SORE THROAT: 0
COLOR CHANGE: 0
SINUS PRESSURE: 0
CHEST TIGHTNESS: 0

## 2020-04-30 NOTE — PROGRESS NOTES
2020    TELEHEALTH EVALUATION -- Audio/Visual (During YGMIW-21 public health emergency)    HPI:    Libia Cook (:  1985) has requested an audio/video evaluation for the following concern(s):  1. Location of patient - Home  2. Location of physician - Office  3. Other individuals on this call - no    28 y/o WM with hx of being a 30 week EGA premature infant requiring 3 days on a ventilator and 6 mths in the NICU as a  with concerns about persistent mild leukopenia without neutropenia over the past 3 years. He has not had any anemia or thrombocytopenia on review of previous CBCs. 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 He had recurrent pneumonia as a child but not  recurrently as an adult. He does have some allergic rhinitis and mild persistent asthma as far as chronic medical problems. He has not had any lymph node swelling, unexplained fevers, night sweats, or abnormal weight loss. No easy bleeding or easy bruising. Review of Systems   Constitutional: Negative for appetite change, chills, diaphoresis, fatigue, fever and unexpected weight change. HENT: Negative for ear pain, rhinorrhea, sinus pressure, sore throat and voice change. Eyes: Negative for pain, discharge and redness. Respiratory: Negative for cough, chest tightness, shortness of breath and wheezing. Hx of prematurity (30 weeks), Chronic lung disease due to prematurity, and recurrent PNAs   Cardiovascular: Negative for chest pain and palpitations. Gastrointestinal: Negative for abdominal pain, blood in stool, diarrhea and vomiting. Endocrine: Negative for cold intolerance, heat intolerance and polydipsia. Genitourinary: Negative for dysuria and hematuria. Musculoskeletal: Negative for arthralgias, myalgias, neck pain and neck stiffness.    Skin: Negative for color change and Birth Weight 3lbs 11 oz   ,   Past Surgical History:   Procedure Laterality Date    CYST REMOVAL      MASTECTOMY Right 2003    due to gynecomastia   ,   Social History     Tobacco Use    Smoking status: Never Smoker    Smokeless tobacco: Never Used   Substance Use Topics    Alcohol use: Yes     Alcohol/week: 0.0 standard drinks     Comment: rarely    Drug use: No   ,   Family History   Problem Relation Age of Onset    Heart Disease Paternal Grandfather     Prostate Cancer Father     Diabetes Daughter         Type I       PHYSICAL EXAMINATION:  [ INSTRUCTIONS:  \"[x]\" Indicates a positive item  \"[]\" Indicates a negative item  -- DELETE ALL ITEMS NOT EXAMINED]  Vital Signs: (As obtained by patient/caregiver or practitioner observation)    Blood pressure-  Heart rate-    Respiratory rate-    Temperature-  Pulse oximetry-     Constitutional: [] Appears well-developed and well-nourished [] No apparent distress      [] Abnormal-   Mental status  [] Alert and awake  [] Oriented to person/place/time []Able to follow commands      Eyes:  EOM    []  Normal  [] Abnormal-  Sclera  []  Normal  [] Abnormal -         Discharge []  None visible  [] Abnormal -    HENT:   [] Normocephalic, atraumatic.   [] Abnormal   [] Mouth/Throat: Mucous membranes are moist.     External Ears [] Normal  [] Abnormal-     Neck: [] No visualized mass     Pulmonary/Chest: [] Respiratory effort normal.  [] No visualized signs of difficulty breathing or respiratory distress        [] Abnormal-      Musculoskeletal:   [] Normal gait with no signs of ataxia         [] Normal range of motion of neck        [] Abnormal-       Neurological:        [] No Facial Asymmetry (Cranial nerve 7 motor function) (limited exam to video visit)          [] No gaze palsy        [] Abnormal-         Skin:        [] No significant exanthematous lesions or discoloration noted on facial skin         [] Abnormal-            Psychiatric:       [] Normal Affect [] No

## 2020-05-07 ENCOUNTER — E-VISIT (OUTPATIENT)
Dept: FAMILY MEDICINE CLINIC | Age: 35
End: 2020-05-07

## 2020-05-07 NOTE — PROGRESS NOTES
in stool, constipation, diarrhea, nausea and vomiting. Endocrine: Negative for cold intolerance, heat intolerance, polydipsia and polyuria. Genitourinary: Negative for difficulty urinating, dysuria, hematuria and urgency. Musculoskeletal: Negative for arthralgias, back pain, joint swelling and myalgias. Skin: Negative for color change and rash. Allergic/Immunologic: Positive for environmental allergies and food allergies (Possible recent [place] food seasoning allergy). Neurological: Negative for dizziness, tremors, seizures, syncope and light-headedness. Hematological: Negative for adenopathy. Does not bruise/bleed easily. Psychiatric/Behavioral: Negative for behavioral problems and suicidal ideas. The patient is not nervous/anxious. All other systems reviewed and are negative. Objective   /78   Pulse 78   Temp 97.6 °F (36.4 °C) (Temporal)   Ht 5' 9\" (1.753 m)   Wt 198 lb 6.4 oz (90 kg)   SpO2 97%   BMI 29.30 kg/m²     PHYSICAL EXAM:  Physical Exam  Vitals signs reviewed. Constitutional:       General: He is not in acute distress. Appearance: He is well-developed. HENT:      Head: Normocephalic and atraumatic. Nose: Nose normal.   Eyes:      General: No scleral icterus. Conjunctiva/sclera: Conjunctivae normal.   Neck:      Vascular: No JVD. Trachea: No tracheal deviation. Cardiovascular:      Rate and Rhythm: Normal rate and regular rhythm. Heart sounds: Normal heart sounds. No murmur. Pulmonary:      Effort: Pulmonary effort is normal. No respiratory distress. Breath sounds: Normal breath sounds. No wheezing. Abdominal:      General: Bowel sounds are normal. There is no distension. Palpations: Abdomen is soft. There is no mass. Tenderness: There is no abdominal tenderness. Musculoskeletal: Normal range of motion. General: No tenderness or deformity. Skin:     Findings: No erythema or rash.    Neurological:      Mental Status: He is alert and oriented to person, place, and time. Psychiatric:         Thought Content: Thought content normal.         VISIT DIAGNOSES  1. Leukopenia, unspecified type    2. Other decreased white blood cell count        ASSESSMENT/PLAN:            Stephanie Mustafa reports that he has known that he has had a low white count for some time. He denies any significant chronic or recurrent infections. He does have seasonal allergy issues and does have some pulmonary issues at times but has not had recurrent pneumonia issues. He denies any night sweats, weight loss, extreme fatigue, chronic pruritus. Prior to his initial visit he did have possibly a food allergy- some spice from a [place], that caused burning and itching across his shoulders and he is currently on prednisone. He denies any abdominal pain specifically in the left upper quadrant. He does carry a diagnosis of asthma and uses an albuterol inhaler and also is on Flonase for seasonal allergies. He takes Allegra over-the-counter. He does take a multivitamin daily. He does not have any history of hypertension, heart disease, rheumatoid arthritis, liver disease. He denies any excessive alcohol intake. CBC in the office today shows that his WBC is actually normal at 6.31 with a normal percent differential with neutrophils 57.8%, lymphocytes 33.3%, monocytes 6.8%, eosinophils 1.6% and basophil 0.5%. Hgb is normal at 15.9 with MCV 91.3 and PLT is 206,000. His CBC today is actually normal with a completely normal WBC with differential.  His white count may be up in the normal range due to the fact he is actually on prednisone at present. I am going to check some baseline labs today however I am going to hold off on sending peripheral blood smear for evaluation since he has been on steroids. I will have a peripheral blood smear sent off when I see him next visit if his WBC is abnormal again.   Physical examination does not reveal any peripheral lymphadenopathy or splenomegaly. He does not have any B symptoms. Specific cause of his mild leukopenia is not known at this time but we will continue to monitor him and follow his CBC trend. Orders Placed This Encounter   Procedures    Electrophoresis Protein, Serum with Reflex to Immunofixation    [place]/Lambda Free Lt Chains, Serum Quant    Comprehensive Metabolic Panel    Vitamin B12    Folate    Copper, Serum    Zinc    HIV Rapid 1&2        Return in about 3 months (around 8/11/2020) for With [facility].       Selvin Monroe PA-C  10:09 AM  5/11/2020

## 2020-05-08 ENCOUNTER — OFFICE VISIT (OUTPATIENT)
Dept: URGENT CARE | Age: 35
End: 2020-05-08
Payer: COMMERCIAL

## 2020-05-08 VITALS
OXYGEN SATURATION: 99 % | BODY MASS INDEX: 28.44 KG/M2 | SYSTOLIC BLOOD PRESSURE: 132 MMHG | HEIGHT: 69 IN | RESPIRATION RATE: 18 BRPM | DIASTOLIC BLOOD PRESSURE: 88 MMHG | TEMPERATURE: 97.6 F | WEIGHT: 192 LBS | HEART RATE: 71 BPM

## 2020-05-08 PROCEDURE — 99213 OFFICE O/P EST LOW 20 MIN: CPT | Performed by: NURSE PRACTITIONER

## 2020-05-08 RX ORDER — HYDROXYZINE PAMOATE 25 MG/1
25 CAPSULE ORAL 3 TIMES DAILY PRN
Qty: 30 CAPSULE | Refills: 0 | Status: SHIPPED | OUTPATIENT
Start: 2020-05-08 | End: 2020-09-21 | Stop reason: ALTCHOICE

## 2020-05-08 RX ORDER — PREDNISONE 10 MG/1
10 TABLET ORAL DAILY
Qty: 5 TABLET | Refills: 0 | Status: SHIPPED | OUTPATIENT
Start: 2020-05-08 | End: 2020-05-13

## 2020-05-08 ASSESSMENT — ENCOUNTER SYMPTOMS
SORE THROAT: 0
COUGH: 0

## 2020-05-08 NOTE — PROGRESS NOTES
[address] [facility]  [address]  [address] [place] [address]  Dept: [phone]  Dept Fax: [phone]  Loc: [phone]    Eduardo Camacho is a 29 y.o. male who presents today for his medical conditions/complaintsas noted below. Eduardo Camacho is c/o of Other (burning in both arms)        HPI:     Pt spoke with PCP via [facility] and she thought he should be evaluated. Pt is complaining of sensation of sunburn without being in the sun to upper body. No rash, no fever, no joint pain. He does mention that he had cuevas the other night and this started after that. He denies any SOB or respiratory symptoms, just basically a burning sensation to upper body. He also notes that he has been under the house recently, not sure if he got into something under the house. He also notes that his allergies have been worse this year. Other   This is a new problem. The current episode started in the past 7 days. Pertinent negatives include no chills, congestion, coughing, fever, joint swelling, rash or sore throat. Nothing aggravates the symptoms. Treatments tried: Benadryl. The treatment provided mild relief. Past Medical History:   Diagnosis Date    Anxiety     Bilateral low back pain without sciatica 9/14/2018    Chronic back pain     Chronic lung disease of prematurity     Depression with anxiety     Gynecomastia 2003    right side    Insomnia     Perennial allergic rhinitis 8/28/2017    Premature infant of 30 weeks gestation     Birth Weight 3lbs 11 oz     Past Surgical History:   Procedure Laterality Date    CYST REMOVAL      MASTECTOMY Right 2003    due to gynecomastia       Family History   Problem Relation Age of Onset    Heart Disease Paternal Grandfather     Prostate Cancer Father     Diabetes Daughter         Type I       Social History     Tobacco Use    Smoking status: Never Smoker    Smokeless tobacco: Never Used   Substance Use Topics    Alcohol use:  Yes Conjunctivae normal.      Pupils: Pupils are equal, round, and reactive to light. Cardiovascular:      Rate and Rhythm: Normal rate and regular rhythm. Heart sounds: Normal heart sounds. Pulmonary:      Effort: Pulmonary effort is normal.      Breath sounds: Normal breath sounds. Skin:     General: Skin is warm and dry. Findings: No burn, ecchymosis, erythema or rash. Neurological:      Mental Status: He is alert and oriented to person, place, and time. Psychiatric:         Speech: Speech normal.         Behavior: Behavior normal.       /88   Pulse 71   Temp 97.6 °F (36.4 °C) (Temporal)   Resp 18   Ht 5' 9\" (1.753 m)   Wt 192 lb (87.1 kg)   SpO2 99%   BMI 28.35 kg/m²     Assessment:       Diagnosis Orders   1. Burning sensation of skin         Plan:    No orders of the defined types were placed in this encounter. No follow-ups on file. Orders Placed This Encounter   Medications    predniSONE (DELTASONE) 10 MG tablet     Sig: Take 1 tablet by mouth daily for 5 days     Dispense:  5 tablet     Refill:  0    hydrOXYzine (VISTARIL) 25 MG capsule     Sig: Take 1 capsule by mouth 3 times daily as needed for Itching MAY CAUSE DROWSINESS     Dispense:  30 capsule     Refill:  0      At this time will treat for allergic reaction/contact dermatitis. Not sure what is causing sensation, however I have advised patient that if symptoms do not improve or worsen he needs to come back to clinic or present to ER. Pt has inhaler at home to use PRN should he need it. I have also advised pt to take Vistaril at HS due to drowsiness and take IN PLACE of Benadryl to see if it helps any better than the Benadryl that he is already taking (and tolerating). Patient given educational materials- see patient instructions. Discussed use, benefit, and side effects of prescribedmedications. All patient questions answered. Pt voiced understanding. Reviewedhealth maintenance.   Instructed to continue current medications, diet and exercise. Patient agreed with treatment plan. Follow up as directed. There are no Patient Instructions on file for this visit.       Electronically signed by MITZI Peter CNP on 5/8/2020 at 8:37 AM

## 2020-05-11 ENCOUNTER — OFFICE VISIT (OUTPATIENT)
Dept: HEMATOLOGY | Age: 35
End: 2020-05-11
Payer: COMMERCIAL

## 2020-05-11 ENCOUNTER — HOSPITAL ENCOUNTER (OUTPATIENT)
Dept: INFUSION THERAPY | Age: 35
Discharge: HOME | End: 2020-05-11
Payer: COMMERCIAL

## 2020-05-11 VITALS
OXYGEN SATURATION: 97 % | SYSTOLIC BLOOD PRESSURE: 118 MMHG | DIASTOLIC BLOOD PRESSURE: 78 MMHG | WEIGHT: 198.4 LBS | TEMPERATURE: 97.6 F | HEART RATE: 78 BPM | BODY MASS INDEX: 29.38 KG/M2 | HEIGHT: 69 IN

## 2020-05-11 DIAGNOSIS — D72.819 LEUKOPENIA, UNSPECIFIED TYPE: ICD-10-CM

## 2020-05-11 LAB
ALBUMIN SERPL-MCNC: 4.6 G/DL (ref 3.5–5.2)
ALP BLD-CCNC: 54 U/L (ref 40–130)
ALT SERPL-CCNC: 19 U/L (ref 21–72)
ANION GAP SERPL CALCULATED.3IONS-SCNC: 11 MMOL/L (ref 7–19)
AST SERPL-CCNC: 18 U/L (ref 17–59)
BILIRUB SERPL-MCNC: 0.4 MG/DL (ref 0.2–1.3)
BUN BLDV-MCNC: 11 MG/DL (ref 9–20)
CALCIUM SERPL-MCNC: 9.7 MG/DL (ref 8.4–10.2)
CHLORIDE BLD-SCNC: 102 MMOL/L (ref 98–111)
CO2: 29 MMOL/L (ref 22–29)
CREAT SERPL-MCNC: 0.8 MG/DL (ref 0.6–1.2)
GFR NON-AFRICAN AMERICAN: >60
GLOBULIN: 2.5 G/DL
GLUCOSE BLD-MCNC: 92 MG/DL (ref 74–106)
POTASSIUM SERPL-SCNC: 4.2 MMOL/L (ref 3.5–5.1)
SODIUM BLD-SCNC: 142 MMOL/L (ref 137–145)
TOTAL PROTEIN: 7.2 G/DL (ref 6.3–8.2)

## 2020-05-11 PROCEDURE — 82607 VITAMIN B-12: CPT

## 2020-05-11 PROCEDURE — 85025 COMPLETE CBC W/AUTO DIFF WBC: CPT

## 2020-05-11 PROCEDURE — 36415 COLL VENOUS BLD VENIPUNCTURE: CPT | Performed by: PHYSICIAN ASSISTANT

## 2020-05-11 PROCEDURE — 99203 OFFICE O/P NEW LOW 30 MIN: CPT | Performed by: PHYSICIAN ASSISTANT

## 2020-05-11 PROCEDURE — 82746 ASSAY OF FOLIC ACID SERUM: CPT

## 2020-05-11 PROCEDURE — 80053 COMPREHEN METABOLIC PANEL: CPT

## 2020-05-11 PROCEDURE — 99212 OFFICE O/P EST SF 10 MIN: CPT

## 2020-05-11 ASSESSMENT — ENCOUNTER SYMPTOMS
COLOR CHANGE: 0
VOMITING: 0
SORE THROAT: 0
VOICE CHANGE: 0
EYE ITCHING: 0
EYE DISCHARGE: 0
ABDOMINAL PAIN: 0
CONSTIPATION: 0
NAUSEA: 0
PHOTOPHOBIA: 0
SHORTNESS OF BREATH: 0
BLOOD IN STOOL: 0
WHEEZING: 0
DIARRHEA: 0
ABDOMINAL DISTENTION: 0
COUGH: 0
TROUBLE SWALLOWING: 0
BACK PAIN: 0

## 2020-05-12 LAB
FOLATE: 14.3 NG/ML (ref 2.7–20)
VITAMIN B-12: 455 PG/ML (ref 239–931)

## 2020-05-15 LAB
BASOPHILS ABSOLUTE: 0.03 K/UL (ref 0.01–0.08)
BASOPHILS RELATIVE PERCENT: 0.5 % (ref 0.1–1.2)
EOSINOPHILS ABSOLUTE: 0.1 K/UL (ref 0.04–0.54)
EOSINOPHILS RELATIVE PERCENT: 1.6 % (ref 0.7–7)
HCT VFR BLD CALC: 47 % (ref 40.1–51)
HEMOGLOBIN: 15.9 G/DL (ref 13.7–17.5)
LYMPHOCYTES ABSOLUTE: 2.1 K/UL (ref 1.18–3.74)
LYMPHOCYTES RELATIVE PERCENT: 33.3 % (ref 19.3–53.1)
MCH RBC QN AUTO: 30.9 PG (ref 25.7–32.2)
MCHC RBC AUTO-ENTMCNC: 33.8 G/DL (ref 32.3–36.5)
MCV RBC AUTO: 91.3 FL (ref 79–92.2)
MONOCYTES ABSOLUTE: 0.43 K/UL (ref 0.24–0.82)
MONOCYTES RELATIVE PERCENT: 6.8 % (ref 4.7–12.5)
NEUTROPHILS ABSOLUTE: 3.65 K/UL (ref 1.56–6.13)
NEUTROPHILS RELATIVE PERCENT: 57.8 % (ref 34–71.1)
PDW BLD-RTO: 13.3 % (ref 11.6–14.4)
PLATELET # BLD: 206 K/UL (ref 163–337)
PMV BLD AUTO: 10.9 FL (ref 7.4–10.4)
RBC # BLD: 5.15 M/UL (ref 4.63–6.08)
WBC # BLD: 6.31 K/UL (ref 4.23–9.07)

## 2020-05-15 RX ORDER — CALCIUM CARBONATE/VITAMIN D3 500-10/5ML
2 LIQUID (ML) ORAL DAILY
Qty: 30 CAPSULE | Refills: 1 | Status: SHIPPED | OUTPATIENT
Start: 2020-05-15 | End: 2020-12-07 | Stop reason: SDUPTHER

## 2020-07-08 ENCOUNTER — HOSPITAL ENCOUNTER (EMERGENCY)
Age: 35
Discharge: HOME | End: 2020-07-08
Payer: COMMERCIAL

## 2020-07-08 ENCOUNTER — APPOINTMENT (OUTPATIENT)
Dept: GENERAL RADIOLOGY | Age: 35
End: 2020-07-08
Payer: COMMERCIAL

## 2020-07-08 VITALS
OXYGEN SATURATION: 93 % | SYSTOLIC BLOOD PRESSURE: 122 MMHG | TEMPERATURE: 97.8 F | HEART RATE: 69 BPM | DIASTOLIC BLOOD PRESSURE: 74 MMHG | RESPIRATION RATE: 20 BRPM

## 2020-07-08 LAB
ALBUMIN SERPL-MCNC: 4.4 G/DL (ref 3.5–5.2)
ALP BLD-CCNC: 60 U/L (ref 40–130)
ALT SERPL-CCNC: 29 U/L (ref 5–41)
ANION GAP SERPL CALCULATED.3IONS-SCNC: 10 MMOL/L (ref 7–19)
AST SERPL-CCNC: 23 U/L (ref 5–40)
BASOPHILS ABSOLUTE: 0 K/UL (ref 0–0.2)
BASOPHILS RELATIVE PERCENT: 0.8 % (ref 0–1)
BILIRUB SERPL-MCNC: 0.6 MG/DL (ref 0.2–1.2)
BUN BLDV-MCNC: 13 MG/DL (ref 6–20)
CALCIUM SERPL-MCNC: 9 MG/DL (ref 8.6–10)
CHLORIDE BLD-SCNC: 105 MMOL/L (ref 98–111)
CO2: 23 MMOL/L (ref 22–29)
CREAT SERPL-MCNC: 0.9 MG/DL (ref 0.5–1.2)
EOSINOPHILS ABSOLUTE: 0.1 K/UL (ref 0–0.6)
EOSINOPHILS RELATIVE PERCENT: 1.6 % (ref 0–5)
GFR NON-AFRICAN AMERICAN: >60
GLUCOSE BLD-MCNC: 95 MG/DL (ref 74–109)
HCT VFR BLD CALC: 44.1 % (ref 42–52)
HEMOGLOBIN: 15.3 G/DL (ref 14–18)
IMMATURE GRANULOCYTES #: 0 K/UL
LYMPHOCYTES ABSOLUTE: 1.4 K/UL (ref 1.1–4.5)
LYMPHOCYTES RELATIVE PERCENT: 36.6 % (ref 20–40)
MCH RBC QN AUTO: 30.4 PG (ref 27–31)
MCHC RBC AUTO-ENTMCNC: 34.7 G/DL (ref 33–37)
MCV RBC AUTO: 87.5 FL (ref 80–94)
MONOCYTES ABSOLUTE: 0.4 K/UL (ref 0–0.9)
MONOCYTES RELATIVE PERCENT: 9.4 % (ref 0–10)
NEUTROPHILS ABSOLUTE: 2 K/UL (ref 1.5–7.5)
NEUTROPHILS RELATIVE PERCENT: 51.3 % (ref 50–65)
PDW BLD-RTO: 12.9 % (ref 11.5–14.5)
PLATELET # BLD: 258 K/UL (ref 130–400)
PMV BLD AUTO: 10.4 FL (ref 9.4–12.4)
POTASSIUM SERPL-SCNC: 3.9 MMOL/L (ref 3.5–5)
RBC # BLD: 5.04 M/UL (ref 4.7–6.1)
SODIUM BLD-SCNC: 138 MMOL/L (ref 136–145)
TOTAL PROTEIN: 7 G/DL (ref 6.6–8.7)
TROPONIN: <0.01 NG/ML (ref 0–0.03)
TSH SERPL DL<=0.05 MIU/L-ACNC: 1.13 UIU/ML (ref 0.27–4.2)
WBC # BLD: 3.8 K/UL (ref 4.8–10.8)

## 2020-07-08 PROCEDURE — 85025 COMPLETE CBC W/AUTO DIFF WBC: CPT

## 2020-07-08 PROCEDURE — 71045 X-RAY EXAM CHEST 1 VIEW: CPT

## 2020-07-08 PROCEDURE — 36415 COLL VENOUS BLD VENIPUNCTURE: CPT

## 2020-07-08 PROCEDURE — 84443 ASSAY THYROID STIM HORMONE: CPT

## 2020-07-08 PROCEDURE — 84484 ASSAY OF TROPONIN QUANT: CPT

## 2020-07-08 PROCEDURE — 80053 COMPREHEN METABOLIC PANEL: CPT

## 2020-07-08 PROCEDURE — 93005 ELECTROCARDIOGRAM TRACING: CPT | Performed by: OBSTETRICS & GYNECOLOGY

## 2020-07-08 PROCEDURE — 99285 EMERGENCY DEPT VISIT HI MDM: CPT

## 2020-07-08 ASSESSMENT — ENCOUNTER SYMPTOMS: SHORTNESS OF BREATH: 0

## 2020-07-08 NOTE — ED PROVIDER NOTES
[facility] EMERGENCY DEPT  eMERGENCY dEPARTMENT eNCOUnter      Pt Name: Luci Augustine  MRN: [id number]  [place] 1985  Date of evaluation: 7/8/2020  Provider: [address]       Chief Complaint   Patient presents with    Palpitations     per pt he got shocked by his shed light yesterday         HISTORY OF PRESENT ILLNESS   (Location/Symptom, Timing/Onset,Context/Setting, Quality, Duration, Modifying Factors, Severity)  Note limiting factors. Luci Augustine is a 29 y.o. male who presents to the emergency department with palpitations. Yesterday he touched the shed light and got shocked. Since then he has felt chest pressure. He denies any chest pain. No loc. NO cardiac history. NO history of anxiety. No shortness of breath. NO burns anywhere. Was normal household current     The history is provided by the patient. Palpitations   Onset quality:  Sudden  Duration:  1 day  Timing:  Constant  Progression:  Unchanged  Chronicity:  New  Associated symptoms: no chest pain, no dizziness, no near-syncope, no numbness, no shortness of breath and no syncope        NursingNotes were reviewed. REVIEW OF SYSTEMS    (2-9 systems for level 4, 10 or more for level 5)     Review of Systems   Respiratory: Negative for shortness of breath. Cardiovascular: Positive for palpitations. Negative for chest pain, syncope and near-syncope. Musculoskeletal: Negative for arthralgias. Neurological: Negative for dizziness and numbness. Except as noted above the remainder of the review of systems was reviewed and negative.        PAST MEDICAL HISTORY     Past Medical History:   Diagnosis Date    Anxiety     Bilateral low back pain without sciatica 9/14/2018    Chronic back pain     Chronic lung disease of prematurity     Depression with anxiety     Gynecomastia 2003    right side    Insomnia     Perennial allergic rhinitis 8/28/2017    Premature infant of 30 weeks gestation     Birth Weight 3lbs 11 oz         SURGICALHISTORY       Past Surgical History:   Procedure Laterality Date    CYST REMOVAL      MASTECTOMY Right 2003    due to gynecomastia         CURRENT MEDICATIONS       Discharge Medication List as of 7/8/2020  8:11 PM      CONTINUE these medications which have NOT CHANGED    Details   Copper Gluconate (COPPER CAPS) 2 MG CAPS Take 2 mg by mouth daily, Disp-30 capsule, R-1Normal      hydrOXYzine (VISTARIL) 25 MG capsule Take 1 capsule by mouth 3 times daily as needed for Itching MAY CAUSE DROWSINESS, Disp-30 capsule, R-0Normal      Melatonin 5 MG CAPS 5-10 mg at bedtime as needed for insomnia, R-5OTC      albuterol sulfate HFA (PROAIR HFA) 108 (90 Base) MCG/ACT inhaler Inhale 2 puffs into the lungs every 4 hours as needed for Wheezing or Shortness of Breath, Disp-2 Inhaler, R-3Normal      fluticasone (FLONASE) 50 MCG/ACT nasal spray 1 spray by Nasal route daily, Disp-1 Bottle, R-3Normal      Multiple Vitamins-Minerals (THERAPEUTIC MULTIVITAMIN-MINERALS) tablet Take 1 tablet by mouth daily      Fexofenadine HCl (ALLEGRA PO) Take by mouth             ALLERGIES     Amoxicillin; Other; and Pcn [penicillins]    FAMILY HISTORY       Family History   Problem Relation Age of Onset    Heart Disease Paternal Grandfather     Prostate Cancer Father     Diabetes Daughter         Type I          SOCIAL HISTORY       Social History     Socioeconomic History    Marital status:      Spouse name: None    Number of children: 1    Years of education: None    Highest education level: None   Occupational History    Occupation:    Social Needs    Financial resource strain: None    Food insecurity     Worry: None     Inability: None    Transportation needs     Medical: None     Non-medical: None   Tobacco Use    Smoking status: Never Smoker    Smokeless tobacco: Never Used   Substance and Sexual Activity    Alcohol use:  Yes     Alcohol/week: 0.0 standard drinks     Comment: rarely    dictations but occasionally words are mis-transcribed.)    MITZI Loyd (electronically signed)          MITZI Loyd  07/08/20 2029

## 2020-07-08 NOTE — ED NOTES
Bed: 20  Expected date:   Expected time:   Means of arrival:   Comments:     Xi Pacheco  07/08/20 [address]

## 2020-07-09 LAB
EKG P AXIS: 57 DEGREES
EKG P-R INTERVAL: 150 MS
EKG Q-T INTERVAL: 352 MS
EKG QRS DURATION: 90 MS
EKG QTC CALCULATION (BAZETT): 374 MS
EKG T AXIS: 39 DEGREES

## 2020-07-10 ENCOUNTER — CARE COORDINATION (OUTPATIENT)
Dept: CARE COORDINATION | Age: 35
End: 2020-07-10

## 2020-07-10 NOTE — CARE COORDINATION
Patient returned this author's call. COVID-19 ED/Flu Clinic Initial Outreach Note    Patient contacted regarding recent visit for viral symptoms. This Red Daniel contacted the patient by telephone to perform post discharge call. Verified name and  with patient as identifiers. Provided introduction to self, and reason for call due to viral symptoms of infection and/or exposure to COVID-19. Patient presented to emergency department/flu clinic with complaints of Palpitations. Patient reports symptoms are improving. Due to no new or worsening symptoms the RN CTN/XIANG was not notified for escalation. Discussed exposure protocols and quarantine with CDC Guidelines What To Do If You Are Sick      Patient was given an opportunity for questions and concerns. Stay home except to get medical care  People who are mildly ill with COVID-19 are able to isolate at home during their illness. You should restrict activities outside your home, except for getting medical care. Do not go to work, school, or public areas. Avoid using public transportation, ride-sharing, or taxis. Separate yourself from other people and animals in your home  People: As much as possible, you should stay in a specific room and away from other people in your home. Also, you should use a separate bathroom, if available. Animals: You should restrict contact with pets and other animals while you are sick with COVID-19, just like you would around other people. Although there have not been reports of pets or other animals becoming sick with COVID-19, it is still recommended that people sick with COVID-19 limit contact with animals until more information is known about the virus. When possible, have another member of your household care for your animals while you are sick. If you are sick with COVID-19, avoid contact with your pet, including petting, snuggling, being kissed or licked, and sharing food.  If you must care for your pet or everyday  High touch surfaces include counters, tabletops, doorknobs, bathroom fixtures, toilets, phones, keyboards, tablets, and bedside tables. Also, clean any surfaces that may have blood, stool, or body fluids on them. Use a household cleaning spray or wipe, according to the label instructions. Labels contain instructions for safe and effective use of the cleaning product including precautions you should take when applying the product, such as wearing gloves and making sure you have good ventilation during use of the product. Monitor your symptoms  Seek prompt medical attention if your illness is worsening (e.g., difficulty breathing). Before seeking care, call your healthcare provider and tell them that you have, or are being evaluated for, COVID-19. Put on a facemask before you enter the facility. These steps will help the healthcare provider's office to keep other people in the office or waiting room from getting infected or exposed. Ask your healthcare provider to call the local or [facility] health department. Persons who are placed under If you have a medical emergency and need to call 911, notify the dispatch personnel that you have, or are being evaluated for COVID-19. If possible, put on a facemask before emergency medical services arrive. The patient agrees to contact the [facility] exposure line [phone], local health department [facility] Department of Health: ([phone]) and PCP office for questions related to their healthcare. Author provided contact information for future reference. Patient/family/caregiver given information for [employer] and agrees to enroll yes    Based on Loop alert triggers, patient will be contacted by nurse care manager for worsening symptoms.     Submitted by Fermín/KYE

## 2020-07-10 NOTE — CARE COORDINATION
[facility] ED:  07.08.2020  DX:  PALPITATIONS    COVID RISK    Telephoned the patient to conduct 14 day ED follow-up call for COVID-19 monitoring. No answer. Left voice message asking for a call back. Patient has active MyChart. Sending patient [phone] information.     Submitted by Fermín/KYE

## 2020-07-31 NOTE — PROGRESS NOTES
Progress Note      Pt Name: Celina Foy  YOB: 1985  MRN: [id number]    Date of evaluation: 8/3/2020  History Obtained From:  patient, electronic medical record    CHIEF COMPLAINT:    Chief Complaint   Patient presents with    Follow-up     Leukopenia, unspecified type      Current active problems  Leukopenia  Mild copper deficiency    HISTORY OF PRESENT ILLNESS:    Celina Foy is a 29 y.o.  male who was seen initially in the office on 5/11/2020 referred by Dr. Nel Linton for evaluation of a persistent mild leukopenia. He was on steroids for a food allergy/reaction at the time of his initial evaluation and his WBC was normal.  Baseline serology revealed a mildly low copper level and he was started on copper 2 mg daily. He currently is off of the steroids. He is taking the copper daily and tolerating it well without any nausea or vomiting. He denies any exacerbations of asthma continues using his albuterol inhaler. He continues on Flonase for seasonal allergies. He denied any history of hypertension, heart disease, rheumatoid arthritis, liver disease. He denies any excessive alcohol intake. HEMATOLOGY HISTORY: Barbara Obregon was seen in initial hematology consultation on 5/11/2020 referred by Dr. Chica Hewitt for evaluation of a persistent mild leukopenia.          Norma Obregon reports that he has known that he has had a low white count for some time. He denies any significant chronic or recurrent infections.   He does have seasonal allergy issues and does have some pulmonary issues at times but has not had recurrent pneumonia issues.     He denies any night sweats, weight loss, extreme fatigue, chronic pruritus.     Prior to his initial visit he did have possibly a food allergy- some spice from a [place], that caused burning and itching across his shoulders and he is currently on prednisone.     He denied any abdominal pain specifically in the left upper quadrant.     His initial CBC in the office on 5/11/2020 revealed that he has WBC was actually normal at 6.31 with a normal percent differential with neutrophils 57.8%, lymphocytes 33.3%, monocytes 6.8%, eosinophils 1.6% and basophil 0.5%. Hgb was normal at 15.9 with MCV 91.3 and PLT was 206,000.     His CBC on his initial visit 5/11/2020 above revealed that his WBC was completely normal.  This could be affected from the steroid today we was on prior to that visit. Physical examination does not reveal any peripheral lymphadenopathy or splenomegaly. He does not have any B symptoms. Serology 5/11/2020  QI - IgG 973, IgA 144, IgM 102 - WNL   SPEP - no M spike with immunofixation negative  Free serum light chains - kappa 8.3, lambda 10.4, K/L ratio 0.8 - WNL  Copper - 70 ()  Zinc - 119  B12 - 455  Folate - 14.3  CMP - WNL  HIV 1/2 - nonreactive    He was started on copper 2 mg daily. I will have a peripheral blood smear sent off when I see him next visit if his WBC is abnormal again.       Past Medical History:   Diagnosis Date    Anxiety     Bilateral low back pain without sciatica 9/14/2018    Chronic back pain     Chronic lung disease of prematurity     Depression with anxiety     Gynecomastia 2003    right side    Insomnia     Perennial allergic rhinitis 8/28/2017    Premature infant of 30 weeks gestation     Birth Weight 3lbs 11 oz        Past Surgical History:   Procedure Laterality Date    CYST REMOVAL      MASTECTOMY Right 2003    due to gynecomastia           Current Outpatient Medications:     Copper Gluconate (COPPER CAPS) 2 MG CAPS, Take 2 mg by mouth daily, Disp: 30 capsule, Rfl: 1    hydrOXYzine (VISTARIL) 25 MG capsule, Take 1 capsule by mouth 3 times daily as needed for Itching MAY CAUSE DROWSINESS, Disp: 30 capsule, Rfl: 0    Melatonin 5 MG CAPS, 5-10 mg at bedtime as needed for insomnia, Disp: , Rfl: 5    albuterol sulfate HFA (PROAIR HFA) 108 (90 Base) MCG/ACT inhaler, Inhale 2 puffs into the lungs every 4 hours as needed for Wheezing or Shortness of Breath, Disp: 2 Inhaler, Rfl: 3    fluticasone (FLONASE) 50 MCG/ACT nasal spray, 1 spray by Nasal route daily, Disp: 1 Bottle, Rfl: 3    Multiple Vitamins-Minerals (THERAPEUTIC MULTIVITAMIN-MINERALS) tablet, Take 1 tablet by mouth daily, Disp: , Rfl:     Fexofenadine HCl (ALLEGRA PO), Take by mouth, Disp: , Rfl:      Allergies   Allergen Reactions    Amoxicillin     Other Rash     STERI STRIPS    Pcn [Penicillins] Rash       Social History     Tobacco Use    Smoking status: Never Smoker    Smokeless tobacco: Never Used   Substance Use Topics    Alcohol use: Yes     Alcohol/week: 0.0 standard drinks     Comment: rarely    Drug use: No       Family History   Problem Relation Age of Onset    Heart Disease Paternal Grandfather     Prostate Cancer Father     Diabetes Daughter         Type I       Subjective   REVIEW OF SYSTEMS:   Review of Systems   Constitutional: Positive for fatigue (Mild). Negative for chills, diaphoresis, fever and unexpected weight change. HENT: Negative for mouth sores, nosebleeds, sore throat, trouble swallowing and voice change. Eyes: Negative for photophobia, discharge and itching. Respiratory: Negative for cough, shortness of breath and wheezing. Cardiovascular: Negative for chest pain, palpitations and leg swelling. Gastrointestinal: Negative for abdominal distention, abdominal pain, blood in stool, constipation, diarrhea, nausea and vomiting. Endocrine: Negative for cold intolerance, heat intolerance, polydipsia and polyuria. Genitourinary: Negative for difficulty urinating, dysuria, hematuria and urgency. Musculoskeletal: Negative for arthralgias, back pain, joint swelling and myalgias. Skin: Negative for color change and rash. Allergic/Immunologic: Positive for environmental allergies and food allergies ([place]al[place] food ).    Neurological: Negative for dizziness, tremors, seizures, syncope and light-headedness. Hematological: Negative for adenopathy. Does not bruise/bleed easily. Psychiatric/Behavioral: Negative for behavioral problems and suicidal ideas. The patient is not nervous/anxious. All other systems reviewed and are negative. Objective   /84   Pulse 70   Temp 97.5 °F (36.4 °C)   Ht 5' 9\" (1.753 m)   Wt 205 lb 11.2 oz (93.3 kg)   SpO2 97%   BMI 30.38 kg/m²     PHYSICAL EXAM:  Physical Exam  Vitals signs reviewed. Constitutional:       General: He is not in acute distress. Appearance: He is well-developed. HENT:      Head: Normocephalic and atraumatic. Nose: Nose normal.   Eyes:      General: No scleral icterus. Conjunctiva/sclera: Conjunctivae normal.   Neck:      Vascular: No JVD. Trachea: No tracheal deviation. Cardiovascular:      Rate and Rhythm: Normal rate and regular rhythm. Heart sounds: Normal heart sounds. No murmur. Pulmonary:      Effort: Pulmonary effort is normal. No respiratory distress. Breath sounds: Normal breath sounds. No wheezing. Abdominal:      General: Bowel sounds are normal. There is no distension. Palpations: Abdomen is soft. There is no mass. Tenderness: There is no abdominal tenderness. Musculoskeletal: Normal range of motion. General: No tenderness or deformity. Skin:     Findings: No erythema or rash. Neurological:      Mental Status: He is alert and oriented to person, place, and time. Psychiatric:         Thought Content: Thought content normal.       Lab Results   Component Value Date    WBC 3.8 (L) 07/08/2020    HGB 15.3 07/08/2020    HCT 44.1 07/08/2020    MCV 87.5 07/08/2020     07/08/2020         VISIT DIAGNOSES  1. Leukopenia, unspecified type    2.  Copper deficiency        ASSESSMENT/PLAN:      His CBC on his initial visit 5/11/2020 above revealed that his WBC was completely normal.  This could be affected from the steroid today we was on prior to that visit. Physical examination does not reveal any peripheral lymphadenopathy or splenomegaly. He does not have any B symptoms. Serology 5/11/2020  QI - IgG 973, IgA 144, IgM 102 - WNL   SPEP - no M spike with immunofixation negative  Free serum light chains - kappa 8.3, lambda 10.4, K/L ratio 0.8 - WNL  Copper - 70 ()  Zinc - 119  B12 - 455  Folate - 14.3  CMP - WNL  HIV 1/2 - nonreactive    CBC today does show that his WBC has decreased to 3.83 with ANC 1.78. I discussed the fact that his [address] was adequate. Percentage differential is within normal limits. His Hgb and PLT continue to be completely normal.  I discussed potential need for bone marrow aspiration biopsy, reason for the procedure, risk, alternatives with him. After discussion we are going to send peripheral blood smear for evaluation to [facility] today continue to monitor him conservatively at present. He does not have any recurrent infections. He does not have any splenomegaly, peripheral adenopathy on exam.  I am also going to recheck his copper level today and he will stay on copper daily for now. Orders Placed This Encounter   Procedures    Miscellaneous Sendout 1    Copper, Serum        Return in about 4 months (around 12/3/2020) for With [facility].      Anabella Sumner PA-C  8:34 AM  8/3/2020

## 2020-08-03 ENCOUNTER — HOSPITAL ENCOUNTER (OUTPATIENT)
Dept: INFUSION THERAPY | Age: 35
Discharge: HOME | End: 2020-08-03
Payer: COMMERCIAL

## 2020-08-03 ENCOUNTER — OFFICE VISIT (OUTPATIENT)
Dept: HEMATOLOGY | Age: 35
End: 2020-08-03
Payer: COMMERCIAL

## 2020-08-03 VITALS
DIASTOLIC BLOOD PRESSURE: 84 MMHG | WEIGHT: 205.7 LBS | TEMPERATURE: 97.5 F | SYSTOLIC BLOOD PRESSURE: 128 MMHG | HEIGHT: 69 IN | OXYGEN SATURATION: 97 % | BODY MASS INDEX: 30.47 KG/M2 | HEART RATE: 70 BPM

## 2020-08-03 DIAGNOSIS — D72.818 OTHER DECREASED WHITE BLOOD CELL COUNT: ICD-10-CM

## 2020-08-03 LAB
BASOPHILS ABSOLUTE: 0.04 K/UL (ref 0.01–0.08)
BASOPHILS RELATIVE PERCENT: 1 % (ref 0.1–1.2)
EOSINOPHILS ABSOLUTE: 0.09 K/UL (ref 0.04–0.54)
EOSINOPHILS RELATIVE PERCENT: 2.3 % (ref 0.7–7)
HCT VFR BLD CALC: 47.6 % (ref 40.1–51)
HEMOGLOBIN: 16.2 G/DL (ref 13.7–17.5)
LYMPHOCYTES ABSOLUTE: 1.51 K/UL (ref 1.18–3.74)
LYMPHOCYTES RELATIVE PERCENT: 39.4 % (ref 19.3–53.1)
MCH RBC QN AUTO: 31.2 PG (ref 25.7–32.2)
MCHC RBC AUTO-ENTMCNC: 34 G/DL (ref 32.3–36.5)
MCV RBC AUTO: 91.7 FL (ref 79–92.2)
MONOCYTES ABSOLUTE: 0.41 K/UL (ref 0.24–0.82)
MONOCYTES RELATIVE PERCENT: 10.7 % (ref 4.7–12.5)
NEUTROPHILS ABSOLUTE: 1.78 K/UL (ref 1.56–6.13)
NEUTROPHILS RELATIVE PERCENT: 46.6 % (ref 34–71.1)
PDW BLD-RTO: 13.5 % (ref 11.6–14.4)
PLATELET # BLD: 230 K/UL (ref 163–337)
PMV BLD AUTO: 10.9 FL (ref 7.4–10.4)
RBC # BLD: 5.19 M/UL (ref 4.63–6.08)
WBC # BLD: 3.83 K/UL (ref 4.23–9.07)

## 2020-08-03 PROCEDURE — 85025 COMPLETE CBC W/AUTO DIFF WBC: CPT

## 2020-08-03 PROCEDURE — 99213 OFFICE O/P EST LOW 20 MIN: CPT | Performed by: PHYSICIAN ASSISTANT

## 2020-08-03 PROCEDURE — 99211 OFF/OP EST MAY X REQ PHY/QHP: CPT

## 2020-08-03 ASSESSMENT — ENCOUNTER SYMPTOMS
ABDOMINAL PAIN: 0
BLOOD IN STOOL: 0
WHEEZING: 0
PHOTOPHOBIA: 0
TROUBLE SWALLOWING: 0
COUGH: 0
NAUSEA: 0
EYE ITCHING: 0
SHORTNESS OF BREATH: 0
EYE DISCHARGE: 0
DIARRHEA: 0
ABDOMINAL DISTENTION: 0
SORE THROAT: 0
VOMITING: 0
COLOR CHANGE: 0
CONSTIPATION: 0
VOICE CHANGE: 0
BACK PAIN: 0

## 2020-09-21 ENCOUNTER — OFFICE VISIT (OUTPATIENT)
Dept: FAMILY MEDICINE CLINIC | Age: 35
End: 2020-09-21
Payer: COMMERCIAL

## 2020-09-21 VITALS
BODY MASS INDEX: 30.21 KG/M2 | TEMPERATURE: 97.5 F | SYSTOLIC BLOOD PRESSURE: 136 MMHG | HEART RATE: 100 BPM | WEIGHT: 204 LBS | HEIGHT: 69 IN | OXYGEN SATURATION: 97 % | DIASTOLIC BLOOD PRESSURE: 80 MMHG

## 2020-09-21 PROBLEM — E29.1 HYPOGONADISM MALE: Status: ACTIVE | Noted: 2020-09-21

## 2020-09-21 PROCEDURE — 90732 PPSV23 VACC 2 YRS+ SUBQ/IM: CPT | Performed by: INTERNAL MEDICINE

## 2020-09-21 PROCEDURE — 90471 IMMUNIZATION ADMIN: CPT | Performed by: INTERNAL MEDICINE

## 2020-09-21 PROCEDURE — 99395 PREV VISIT EST AGE 18-39: CPT | Performed by: INTERNAL MEDICINE

## 2020-09-21 ASSESSMENT — ENCOUNTER SYMPTOMS
WHEEZING: 0
DIARRHEA: 0
ABDOMINAL PAIN: 0
RHINORRHEA: 0
COUGH: 0
EYE PAIN: 0
SHORTNESS OF BREATH: 0
EYE DISCHARGE: 0
EYE REDNESS: 0
VOICE CHANGE: 0
COLOR CHANGE: 0
SINUS PRESSURE: 0
SORE THROAT: 0
BLOOD IN STOOL: 0
VOMITING: 0
CHEST TIGHTNESS: 0

## 2020-09-21 ASSESSMENT — VISUAL ACUITY: OU: 1

## 2020-09-21 NOTE — PROGRESS NOTES
After obtaining consent, and per orders of Dr. Arnol Greene, injection of Pneumococcal Polysaccharide given in Right deltoid by Leighann Marinelli. Patient instructed to remain in clinic for 20 minutes afterwards, and to report any adverse reaction to me immediately.

## 2020-09-21 NOTE — PROGRESS NOTES
Sujit Johnson is a 29 y.o. male who presents today for   Chief Complaint   Patient presents with    Annual Exam       HPI  30 y/o WM here for annual wellness. He was seen at [facility] to be evaluated for possible low testosterone recently and was started on testosterone replacement. He feels like his energy level is better since starting testosterone injections (100 mg twice weekly) 2 mths. He feels like he is able to sleep better and feels less anxious and \"more relaxed\". He was also started on copper replacement by Heme/Onc last month when he had an evaluation for leukopenia. Peripheral blood smear was sent out for evaluation and appears normal on the results scanned into chart on 8/12/20. Be Well Screen was normal except slightly low HDL level on March 5, 2020. BMI Readings from Last 3 Encounters:   09/21/20 30.13 kg/m²   08/03/20 30.38 kg/m²   05/11/20 29.30 kg/m²     Wt Readings from Last 3 Encounters:   09/21/20 204 lb (92.5 kg)   08/03/20 205 lb 11.2 oz (93.3 kg)   05/11/20 198 lb 6.4 oz (90 kg)         Review of Systems   Constitutional: Negative for appetite change, chills, fatigue and fever. HENT: Negative for ear pain, rhinorrhea, sinus pressure, sore throat and voice change. Eyes: Negative for pain, discharge and redness. Respiratory: Negative for cough, chest tightness, shortness of breath and wheezing. Cardiovascular: Negative for chest pain and palpitations. Gastrointestinal: Negative for abdominal pain, blood in stool, diarrhea and vomiting. Endocrine: Negative for cold intolerance, heat intolerance and polydipsia. Genitourinary: Negative for dysuria and hematuria. Musculoskeletal: Negative for arthralgias, myalgias, neck pain and neck stiffness. Skin: Negative for color change and rash. Neurological: Negative for dizziness, tremors, syncope, speech difficulty, weakness, numbness and headaches. Hematological: Negative for adenopathy.  Does not bruise/bleed easily. Psychiatric/Behavioral: Negative for confusion, dysphoric mood and sleep disturbance. The patient is not nervous/anxious. All other systems reviewed and are negative. Past Medical History:   Diagnosis Date    Anxiety     Bilateral low back pain without sciatica 9/14/2018    Chronic back pain     Chronic lung disease of prematurity     Depression with anxiety     Gynecomastia 2003    right side    Insomnia     Perennial allergic rhinitis 8/28/2017    Premature infant of 30 weeks gestation     Birth Weight 3lbs 11 oz       Current Outpatient Medications   Medication Sig Dispense Refill    Copper Gluconate (COPPER CAPS) 2 MG CAPS Take 2 mg by mouth daily 30 capsule 1    Melatonin 5 MG CAPS 5-10 mg at bedtime as needed for insomnia  5    albuterol sulfate HFA (PROAIR HFA) 108 (90 Base) MCG/ACT inhaler Inhale 2 puffs into the lungs every 4 hours as needed for Wheezing or Shortness of Breath 2 Inhaler 3    fluticasone (FLONASE) 50 MCG/ACT nasal spray 1 spray by Nasal route daily 1 Bottle 3    Multiple Vitamins-Minerals (THERAPEUTIC MULTIVITAMIN-MINERALS) tablet Take 1 tablet by mouth daily      Fexofenadine HCl (ALLEGRA PO) Take by mouth       No current facility-administered medications for this visit. Allergies   Allergen Reactions    Amoxicillin     Other Rash     STERI STRIPS    Pcn [Penicillins] Rash       Past Surgical History:   Procedure Laterality Date    CYST REMOVAL      MASTECTOMY Right 2003    due to gynecomastia       Social History     Tobacco Use    Smoking status: Never Smoker    Smokeless tobacco: Never Used   Substance Use Topics    Alcohol use:  Yes     Alcohol/week: 0.0 standard drinks     Comment: rarely    Drug use: No       Family History   Problem Relation Age of Onset    Heart Disease Paternal Grandfather     Prostate Cancer Father     Diabetes Daughter         Type I       /80   Pulse 100   Temp 97.5 °F (36.4 °C)   Ht 5' 9\" (1.753 m) Wt 204 lb (92.5 kg)   SpO2 97%   BMI 30.13 kg/m²     Physical Exam  Vitals signs and nursing note reviewed. Constitutional:       General: He is not in acute distress. Appearance: Normal appearance. He is well-developed, well-groomed and normal weight. He is not ill-appearing, toxic-appearing or diaphoretic. HENT:      Head: Normocephalic and atraumatic. Right Ear: Tympanic membrane, ear canal and external ear normal.      Left Ear: Tympanic membrane, ear canal and external ear normal.      Nose: Nose normal.      Mouth/Throat:      Lips: Pink. Mouth: Mucous membranes are moist. No oral lesions. Tongue: No lesions. Palate: No mass and lesions. Pharynx: Oropharynx is clear. Uvula midline. No pharyngeal swelling, oropharyngeal exudate, posterior oropharyngeal erythema or uvula swelling. Tonsils: 1+ on the right. 1+ on the left. Eyes:      General: Lids are normal. Vision grossly intact. No scleral icterus. Extraocular Movements: Extraocular movements intact. Conjunctiva/sclera: Conjunctivae normal.      Pupils: Pupils are equal, round, and reactive to light. Neck:      Musculoskeletal: Normal range of motion and neck supple. Thyroid: No thyroid mass or thyromegaly. Vascular: No carotid bruit or JVD. Trachea: Trachea and phonation normal.   Cardiovascular:      Rate and Rhythm: Normal rate and regular rhythm. Pulses: Normal pulses. Radial pulses are 2+ on the right side and 2+ on the left side. Posterior tibial pulses are 2+ on the right side and 2+ on the left side. Heart sounds: Normal heart sounds. No murmur. No friction rub. No gallop. Pulmonary:      Effort: Pulmonary effort is normal. No accessory muscle usage. Breath sounds: Normal breath sounds. No decreased breath sounds, wheezing, rhonchi or rales. Abdominal:      General: Abdomen is flat. Bowel sounds are normal. There is no distension.       Palpations: Abdomen is soft. There is no hepatomegaly, splenomegaly or mass. Tenderness: There is no abdominal tenderness. There is no guarding or rebound. Hernia: No hernia is present. Musculoskeletal: Normal range of motion. Right wrist: Normal.      Left wrist: Normal.      Right ankle: Normal.      Left ankle: Normal.      Right lower leg: No edema. Left lower leg: No edema. Lymphadenopathy:      Head:      Right side of head: No submandibular adenopathy. Left side of head: No submandibular adenopathy. Cervical: No cervical adenopathy. Right cervical: No superficial, deep or posterior cervical adenopathy. Left cervical: No superficial, deep or posterior cervical adenopathy. Upper Body:      Right upper body: No supraclavicular adenopathy. Left upper body: No supraclavicular adenopathy. Lower Body: No right inguinal adenopathy. No left inguinal adenopathy. Skin:     General: Skin is warm and dry. Capillary Refill: Capillary refill takes less than 2 seconds. Coloration: Skin is not cyanotic. Findings: No rash. Nails: There is no clubbing. Neurological:      Mental Status: He is alert and oriented to person, place, and time. Cranial Nerves: No cranial nerve deficit, dysarthria or facial asymmetry. Sensory: Sensation is intact. Motor: Motor function is intact. No weakness, tremor, atrophy or abnormal muscle tone. Coordination: Coordination is intact. Romberg sign negative. Coordination normal.      Gait: Gait is intact. Deep Tendon Reflexes: Reflexes are normal and symmetric. Reflex Scores:       Brachioradialis reflexes are 2+ on the right side and 2+ on the left side. Patellar reflexes are 2+ on the right side and 2+ on the left side.      Comments: CN II-XII grossly intact, speech clear, MAEW, no focal deficits   Psychiatric:         Attention and Perception: Attention and perception normal.         Mood and Affect: Mood and affect normal.         Speech: Speech normal.         Behavior: Behavior normal. Behavior is cooperative. Thought Content: Thought content normal.         Cognition and Memory: Cognition and memory normal.         Judgment: Judgment normal.         Lab Results   Component Value Date    WBC 3.83 (L) 08/03/2020    HGB 16.2 08/03/2020    HCT 47.6 08/03/2020    MCV 91.7 08/03/2020     08/03/2020    LYMPHOPCT 39.4 08/03/2020    RBC 5.19 08/03/2020    MCH 31.2 08/03/2020    MCHC 34.0 08/03/2020    RDW 13.5 08/03/2020     Lab Results   Component Value Date     07/08/2020    K 3.9 07/08/2020     07/08/2020    CO2 23 07/08/2020    BUN 13 07/08/2020    CREATININE 0.9 07/08/2020    GLUCOSE 95 07/08/2020    CALCIUM 9.0 07/08/2020    PROT 7.0 07/08/2020    LABALBU 4.4 07/08/2020    BILITOT 0.6 07/08/2020    ALKPHOS 60 07/08/2020    AST 23 07/08/2020    ALT 29 07/08/2020    LABGLOM >60 07/08/2020    AGRATIO 1.9 (H) 05/11/2020    GLOB 2.5 05/11/2020       Lab Results   Component Value Date    TSH 1.130 07/08/2020     Lab Results   Component Value Date    CHOL 167 03/05/2020    CHOL 167 05/08/2019    CHOL 170 05/30/2018     Lab Results   Component Value Date    TRIG 83 03/05/2020    TRIG 69 05/08/2019    TRIG 62 05/30/2018     Lab Results   Component Value Date    HDL 47 (L) 03/05/2020    HDL 39 (L) 05/08/2019    HDL 52 (L) 05/30/2018     Lab Results   Component Value Date    LDLCALC 103 03/05/2020    LDLCALC 114 05/08/2019    LDLCALC 106 05/30/2018     No results found for: LABVLDL, VLDL  No results found for: CHOLHDLRATIO    No results found for this visit on 09/21/20. Assessment:    ICD-10-CM    1. Annual physical exam  Z00.00    2. Hypogonadism male  E29.1    3. Need for pneumococcal vaccination  Z23 PNEUMOVAX 23 subcutaneous/IM (Pneumococcal polysaccharide vaccine 23-valent >= 1yo)   4.  Overweight  E66.3        Plan:  Lawrence Benavides was seen today for annual exam.    Diagnoses and all orders for this visit:    Annual physical exam    Hypogonadism male    Need for pneumococcal vaccination  -     PNEUMOVAX 23 subcutaneous/IM (Pneumococcal polysaccharide vaccine 23-valent >= 1yo)    Overweight    1. Previous screening labs reviewed with patient. 2. Hypogonadism-new diagnosis per patient. Provider requests records, including lab work, to review in regards to this new diagnosis. Discussed risk of infertility, increased risk of prostate CA, and increased risk of CV events with testosterone therapy with patient today. 3. Patient will get influenza vaccine at work. Pneumovax updated today due to hx of mild intermittent asthma which is well controlled currently. 4. Counseled on importance of low CHO/balanced diet, routine exercise for 60 min 4-5 days a week and need to work at obtaining normal BMI. 5. Return in about 1 year (around 9/21/2021) for [address] [place] [address]. Over 50% of the total visit time of 30 minutes was spent on counseling and/or coordination of care of:   1. Annual physical exam    2. Hypogonadism male    3. Need for pneumococcal vaccination    4. Overweight         Orders Placed This Encounter   Procedures    PNEUMOVAX 23 subcutaneous/IM (Pneumococcal polysaccharide vaccine 23-valent >= 1yo)     No orders of the defined types were placed in this encounter. Medications Discontinued During This Encounter   Medication Reason    hydrOXYzine (VISTARIL) 25 MG capsule Therapy completed     There are no Patient Instructions on file for this visit. Patient voices understanding and agrees to plans along with risks and benefits of plan. Counseling:  Jerrica Quintana case, medications and options were discussed in detail. patient was instructed to call the office if he   questions regarding him treatment. Should him conditions worsen, he should return to office to be reassessed by Dr. Shine Silver. he  Should to go the closest Emergency Department for any emergency.  They verbalized understanding the above instructions.

## 2020-10-26 ENCOUNTER — TELEPHONE (OUTPATIENT)
Dept: NEUROLOGY | Age: 35
End: 2020-10-26

## 2020-10-26 NOTE — TELEPHONE ENCOUNTER
Called patient about an appointment, with Dr Axel Richardson. patient is aware of the appointment, location and phone number.

## 2020-12-06 NOTE — PROGRESS NOTES
Progress Note      Pt Name: Ame Schultz  YOB: 1985  MRN: [id number]    Date of evaluation: 12/7/2020  History Obtained From:  patient, electronic medical record    CHIEF COMPLAINT:    Chief Complaint   Patient presents with    Follow-up     Leukopenia, unspecified type      Current active problems  Leukopenia  Mild copper deficiency    HISTORY OF PRESENT ILLNESS:    Ame Schultz is a 29 y.o.  male who was seen initially in the office on 5/11/2020 referred by Dr. Nina Meyer for evaluation of a persistent mild leukopenia. He was on steroids for a food allergy/reaction at the time of his initial evaluation and his WBC was normal.  Baseline serology revealed a mildly low copper level and he was started on copper 2 mg daily. I repeated his copper level last visit and it was low normal.  He will stay on his copper. He denies any significant side effects of taking the copper. He denies any recurrent infections-any infections whatsoever. He does have asthma but has not had to use his inhaler. He denies any other chronic medical issues. He does take melatonin for sleep which helps significantly. He does take Allegra for seasonal allergies which is apparently stable. He does take multivitamins daily as well. He has repeatedly denied any history of hypertension, heart disease, rheumatoid arthritis, liver disease. He denies any excessive alcohol intake. HEMATOLOGY HISTORY: Leukelvis Souza was seen in initial hematology consultation on 5/11/2020 referred by Dr. Daniel Gonzales for evaluation of a persistent mild leukopenia.          Camryn Souaz reports that he has known that he has had a low white count for some time. He denies any significant chronic or recurrent infections.   He does have seasonal allergy issues and does have some pulmonary issues at times but has not had recurrent pneumonia issues.     He denies any night sweats, weight loss, extreme fatigue, chronic pruritus.     Prior to his initial visit he did have possibly a food allergy- some spice from a [place], that caused burning and itching across his shoulders and he is currently on prednisone.     He denied any abdominal pain specifically in the left upper quadrant.     His initial CBC in the office on 5/11/2020 revealed that he has WBC was actually normal at 6.31 with a normal percent differential with neutrophils 57.8%, lymphocytes 33.3%, monocytes 6.8%, eosinophils 1.6% and basophil 0.5%. Hgb was normal at 15.9 with MCV 91.3 and PLT was 206,000.     His CBC on his initial visit 5/11/2020 above revealed that his WBC was completely normal.  This could be affected from the steroid today we was on prior to that visit. Physical examination does not reveal any peripheral lymphadenopathy or splenomegaly. He does not have any B symptoms. Serology 5/11/2020  QI - IgG 973, IgA 144, IgM 102 - WNL   SPEP - no M spike with immunofixation negative  Free serum light chains - kappa 8.3, lambda 10.4, K/L ratio 0.8 - WNL  Copper - 70 ()  Zinc - 119  B12 - 455  Folate - 14.3  CMP - WNL  HIV 1/2 - nonreactive    He was started on copper 2 mg daily. His CBC at follow-up on 8/3/2020 revealed that his WBC had decreased to 3.83 with ANC 1.78. I discussed the fact that his [address] was adequate. Percentage differential was within normal limits. His Hgb and PLT continued to be completely normal.  I discussed potential need for bone marrow aspiration biopsy, reason for the procedure, risk, alternatives with him. After discussion we sent peripheral blood smear for evaluation to [facility] he desired to continue to be monitored conservatively. He did not have any recurrent infections. He does not have any splenomegaly, peripheral adenopathy on exam.    PERIPHERAL BLOOD SMEAR 8/3/2022 [facility]  · Normocytic normochromic red blood cells with no significant anisopoikilocytosis  · There is mild leukopenia.

## 2020-12-07 ENCOUNTER — OFFICE VISIT (OUTPATIENT)
Dept: HEMATOLOGY | Age: 35
End: 2020-12-07
Payer: COMMERCIAL

## 2020-12-07 ENCOUNTER — HOSPITAL ENCOUNTER (OUTPATIENT)
Dept: INFUSION THERAPY | Age: 35
Discharge: HOME | End: 2020-12-07
Payer: COMMERCIAL

## 2020-12-07 VITALS
TEMPERATURE: 97 F | OXYGEN SATURATION: 97 % | HEART RATE: 71 BPM | DIASTOLIC BLOOD PRESSURE: 84 MMHG | HEIGHT: 69 IN | SYSTOLIC BLOOD PRESSURE: 138 MMHG | WEIGHT: 185.2 LBS | BODY MASS INDEX: 27.43 KG/M2

## 2020-12-07 DIAGNOSIS — D72.818 OTHER DECREASED WHITE BLOOD CELL COUNT: ICD-10-CM

## 2020-12-07 LAB
BASOPHILS ABSOLUTE: 0.01 K/UL (ref 0.01–0.08)
BASOPHILS RELATIVE PERCENT: 0.3 % (ref 0.1–1.2)
EOSINOPHILS ABSOLUTE: 0.03 K/UL (ref 0.04–0.54)
EOSINOPHILS RELATIVE PERCENT: 0.8 % (ref 0.7–7)
HCT VFR BLD CALC: 48.7 % (ref 40.1–51)
HEMOGLOBIN: 16.9 G/DL (ref 13.7–17.5)
LYMPHOCYTES ABSOLUTE: 1.4 K/UL (ref 1.18–3.74)
LYMPHOCYTES RELATIVE PERCENT: 38.5 % (ref 19.3–53.1)
MCH RBC QN AUTO: 30.7 PG (ref 25.7–32.2)
MCHC RBC AUTO-ENTMCNC: 34.7 G/DL (ref 32.3–36.5)
MCV RBC AUTO: 88.4 FL (ref 79–92.2)
MONOCYTES ABSOLUTE: 0.3 K/UL (ref 0.24–0.82)
MONOCYTES RELATIVE PERCENT: 8.2 % (ref 4.7–12.5)
NEUTROPHILS ABSOLUTE: 1.9 K/UL (ref 1.56–6.13)
NEUTROPHILS RELATIVE PERCENT: 52.2 % (ref 34–71.1)
PDW BLD-RTO: 13.1 % (ref 11.6–14.4)
PLATELET # BLD: 227 K/UL (ref 163–337)
PMV BLD AUTO: 10.8 FL (ref 7.4–10.4)
RBC # BLD: 5.51 M/UL (ref 4.63–6.08)
WBC # BLD: 3.64 K/UL (ref 4.23–9.07)

## 2020-12-07 PROCEDURE — 85025 COMPLETE CBC W/AUTO DIFF WBC: CPT

## 2020-12-07 PROCEDURE — 99213 OFFICE O/P EST LOW 20 MIN: CPT | Performed by: PHYSICIAN ASSISTANT

## 2020-12-07 PROCEDURE — 99211 OFF/OP EST MAY X REQ PHY/QHP: CPT

## 2020-12-07 RX ORDER — CALCIUM CARBONATE/VITAMIN D3 500-10/5ML
2 LIQUID (ML) ORAL DAILY
Qty: 90 CAPSULE | Refills: 2 | Status: SHIPPED | OUTPATIENT
Start: 2020-12-07 | End: 2021-04-12

## 2020-12-07 ASSESSMENT — ENCOUNTER SYMPTOMS
COLOR CHANGE: 0
CONSTIPATION: 0
EYE ITCHING: 0
WHEEZING: 0
DIARRHEA: 0
ABDOMINAL DISTENTION: 0
VOICE CHANGE: 0
SHORTNESS OF BREATH: 0
COUGH: 0
EYE DISCHARGE: 0
BACK PAIN: 0
VOMITING: 0
NAUSEA: 0
TROUBLE SWALLOWING: 0
PHOTOPHOBIA: 0
ABDOMINAL PAIN: 0
BLOOD IN STOOL: 0
SORE THROAT: 0

## 2021-01-28 ENCOUNTER — TELEPHONE (OUTPATIENT)
Dept: ADMINISTRATIVE | Age: 36
End: 2021-01-28

## 2021-01-29 DIAGNOSIS — F41.9 ANXIETY: Primary | ICD-10-CM

## 2021-01-29 DIAGNOSIS — F41.8 DEPRESSION WITH ANXIETY: ICD-10-CM

## 2021-02-03 ENCOUNTER — VIRTUAL VISIT (OUTPATIENT)
Dept: PSYCHOLOGY | Age: 36
End: 2021-02-03
Payer: COMMERCIAL

## 2021-02-03 DIAGNOSIS — F43.9 SITUATIONAL STRESS: ICD-10-CM

## 2021-02-03 DIAGNOSIS — F41.1 GAD (GENERALIZED ANXIETY DISORDER): ICD-10-CM

## 2021-02-03 DIAGNOSIS — F33.1 MDD (MAJOR DEPRESSIVE DISORDER), RECURRENT EPISODE, MODERATE (HCC): Primary | ICD-10-CM

## 2021-02-03 PROCEDURE — 90791 PSYCH DIAGNOSTIC EVALUATION: CPT | Performed by: SOCIAL WORKER

## 2021-02-04 ASSESSMENT — ANXIETY QUESTIONNAIRES
GAD7 TOTAL SCORE: 18
7. FEELING AFRAID AS IF SOMETHING AWFUL MIGHT HAPPEN: 3-NEARLY EVERY DAY
3. WORRYING TOO MUCH ABOUT DIFFERENT THINGS: 3-NEARLY EVERY DAY
4. TROUBLE RELAXING: 3-NEARLY EVERY DAY

## 2021-02-04 ASSESSMENT — PATIENT HEALTH QUESTIONNAIRE - PHQ9
5. POOR APPETITE OR OVEREATING: 0
SUM OF ALL RESPONSES TO PHQ QUESTIONS 1-9: 13
3. TROUBLE FALLING OR STAYING ASLEEP: 3
4. FEELING TIRED OR HAVING LITTLE ENERGY: 2
10. IF YOU CHECKED OFF ANY PROBLEMS, HOW DIFFICULT HAVE THESE PROBLEMS MADE IT FOR YOU TO DO YOUR WORK, TAKE CARE OF THINGS AT HOME, OR GET ALONG WITH OTHER PEOPLE: 3
1. LITTLE INTEREST OR PLEASURE IN DOING THINGS: 1
9. THOUGHTS THAT YOU WOULD BE BETTER OFF DEAD, OR OF HURTING YOURSELF: 0

## 2021-02-04 ASSESSMENT — COLUMBIA-SUICIDE SEVERITY RATING SCALE - C-SSRS
1. WITHIN THE PAST MONTH, HAVE YOU WISHED YOU WERE DEAD OR WISHED YOU COULD GO TO SLEEP AND NOT WAKE UP?: NO
6. HAVE YOU EVER DONE ANYTHING, STARTED TO DO ANYTHING, OR PREPARED TO DO ANYTHING TO END YOUR LIFE?: NO

## 2021-02-04 NOTE — PATIENT INSTRUCTIONS
Recommendations to patient:      1. Practice new coping, stress management, relaxation skills at least                   two times a day for at least 10-30 minutes. 2. Find at least one positive outlet per day that makes you feel better. 3. Talk things over with a good friend. Practice letting things go. 4. Stop, breathe, reset. \"I am ok. \"     Scheduled follow up appointment. Call for a sooner appointment if needed or if you need to change or cancel you appointment. [place] [phone]                                                Depression Cycle         Thoughts        -There is no point in doing anything        -I dont have the energy        -I dont feel like it        -I will probably fail        -I need to rest more        -Ill do it later                          Depressive Symptoms     Behaviors  -Tired/Overwhelmed      -Stay in Bed  -Bored        -Avoid People  -Depressed       -Avoid Fun Activities  -Feeling Worthless      -Avoid Work  -Apathetic/Discouraged     -Guilty                                          Consequences of Behaviors      -Isolated from friends and family      -Decreased number of rewarding experiences      -Decreased sense of accomplishment and pleasure      -Discouraged      -Sink deeper and deeper into a state of paralysis      -Decreased productivity convinces you that you are inadequate    The Stress Response and How It Can Affect You   The stress response, or fight or flight response is the emergency reaction system of the body. It is there to keep you safe in emergencies. The stress response includes physical and thought responses to your perception of various situations. When the stress response is turned on, your body may release substances like adrenaline and cortisol. Your organs are programmed to respond in certain ways to situations that are viewed as challenging or threatening. The stress response can work against you. You can turn it on when you dont really need it and, as a result, perceive something as an emergency when its really not. It can turn on when you are just thinking about past or future events. Harmless, chronic conditions can be intensified by the stress response activating too often, with too much intensity, or for too long. Stress responses can be different for different individuals. Below is a list of some common stress related responses people have. ([place] the responses you have had in the last 2 weeks.)     Physical Responses   Muscle aches   Insomnia   ? Heart rate   Headache   Weight gain   Nausea   Constipation   Dry mouth   Muscle twitching  Weight loss   Low energy   Weakness   Tight chest   Diarrhea   Dizziness   Trembling   Stomach cramps  Chills    Hot flashes   Sweating   Pounding heart  Choking feeling  Chest pain   Leg cramps   Numb hands/feet Dry throat   Appetite change  Face flushing   ? Blood pressure  Light-headedness  Feeling faint       Troubleswallowing   Rash ? Urination   Neck pain     Tingling hands/feet     Emotional and Thought Responses   Restlessness   Agitation   Insecurity            Worthlessness   Anxiety   Stress   Depression            Hopelessness   Guilt    Defensiveness  Anger           Racing thoughts   Nightmares   Intense thinking  Sensitivity          Expecting the worst   Numbness   Lack of motivation  Mood swings             Forgetfulness   ? Concentration  Rigidity              Preoccupation  Intolerance     Behavioral Responses   Avoidance   Withdrawal   Neglect   ? Alcohol use    Smoking   ? Eating   Arguing       Poor appearance   ? Spending   Poor hygiene   ? Eating  Seeking reassurance   Nail biting   Skin picking   ? Talking        ? Body checking   Sexual problems  Foot tapping  Fidgeting Rapid walking    ?  Exercise Teeth clenching           Multitasking  Aggressive speaking       ? Fun activities  ? Sleeping      ? Relaxing activities     Seeking information     The parasympathetic nervous system in your body is designed to turn on your bodys relaxation response. Your behaviors and thinking can keep your bodys natural relaxation response from operating at its best.   Getting your body to relax on a daily basis for at least brief periods can help decrease unpleasant stress responses. Learning to relax your body, through specific breathing and relaxation exercises as well as by minimizing stressful thinking, can help your bodys natural relaxation system be more effective. STRESS MANAGEMENT STRATEGIES    1. Recognize Stress:  Learning to recognize when your body is reacting to stress and identifying our stressors are the first steps in managing stress. 2. Take a Break:  A change of pace, no matter how short, gives us a new outlook on old problems. Take a vacation 20 minutes a day  enjoy a change from the daily routine. 3. Learn to Relax:  Under stress, the muscles in our bodies stay tight. One of the most effective ways to combat tensions is deep muscle relaxation. Other techniques that produce muscle and mental relaxation are yoga, prayer, and deep breathing. 4. Be Nutritionally Aware:  Good nutrition is vital to optimum health, and is especially critical when we are under unusual stress, or going through a major life change. 5. Exercise Regularly:  Just like nutrition, exercise is imperative for maintaining good fitness. Whatever you enjoy  swimming, walking, jogging, aerobic exercise  will help you let off steam and work out stress. 6. Plan your Work:  Tension and anxiety really build up when our work seems endless. Plan your work to use time and energy more efficiently. Take one thing at a time. 7. Talk it Over: This may be the most important thing you can do for yourself if you cant get a handle on things. Find a good listener. Just as a pressure relief valve allows steam to flow out of a pressure cooker and keeps it from blowing up, so talking allows stress to flow out of the body and keeps us from blowing up. 8. Accept What You Cannot Change:  If the problem is beyond your control at this time, try your best to accept it until you can change it. It beats spinning your wheels and getting nowhere. 9. Evaluate Your Perceptions:  What we think is sometimes what we feel. If we constantly think unrealistic or alarming thoughts about ourselves or other folks, then our stress level is increased. 10. Relax Unrealistic Standards:  When we set unrealistic standards for ourselves, we usually can never reach them. If we do, we burn out quickly. Set reasonable goals and standards. 11. Reward Yourself:  Find ways to reward yourself when youve completed a minor or major task. We cannot always depend on others to recognize us, so we must develop our own reward system. 12. Become Assertive: Take steps to solve problems instead of feeling helpless. Distinguishing assertiveness (respecting others rights and your rights) from aggressiveness and passivity can do much to resolve internal stress. 13. Rediscover Humor:  Learn to laugh at yourself and your situation! 14. Increase Pleasurable Activities:  Take time to participate in fun, pleasurable, activities on a regular basis. Problem-Solving. When we are uncertain about what action can be taken to handle a situation, using the problem-solving steps below can help point us in the right direction. 1. Get information about the problem. We often know something is wrong but dont know exactly what. Identifying the problem in clear, specific terms is essential to resolving it. 2. Generate options for resolving the problem. There are usually a number of different ways of dealing with a problem. At this step, its important to come up with as many potential solutions as possible, even if they dont sound quite right at first.  Suspend any judgment of the options until you have generated as many as possible. 3. Evaluate the advantages and disadvantages of implementing each option. Not all options will be as good as others. Think about how successful you think each option will be at resolving the problem, and what might get in the way of that option actually working. 4. Choose an option and implement it. Which option do you think will work the best overall? Make a plan for implementing the option, and put it into action. 5. Analyze whether the option worked to resolve the problem. Keep track of what changes occur as you implement the option chosen. If the option worked, the problem is resolved and you are done. If not, you need to re-think the situation, by repeating the process. 6. Repeat Steps 1 through 5 as needed.

## 2021-02-04 NOTE — PROGRESS NOTES
This note will not be viewable in [facility] for the following reason(s). This is a Psychotherapy Note. Behavioral Health Consultation  Nayana Jodi, [address] Consultant  2/4/2021  7:30 AM        TELEHEALTH EVALUATION -- Audio (During ZNJHK-36 public health emergency)    Patient being evaluated by a phone visit encounter to address concerns as mentioned above. A caregiver was present when appropriate. Due to this being a TeleHealth encounter (During TRKGM-95 public health emergency), evaluation of the following organ systems was limited: Vitals/Constitutional/EENT/Resp/CV/GI//MS/Neuro/Skin/Heme-Lymph-Imm. Pursuant to the emergency declaration under the [address] [place] [address] authority and the [employer] Act, this Virtual Visit was conducted with patient's (and/or legal guardian's) consent, to reduce the patient's risk of exposure to COVID-19 and provide necessary medical care. The patient (and/or legal guardian) has also been advised to contact this office for worsening conditions or problems, and seek emergency medical treatment and/or call 911 if deemed necessary. Patient identification was verified at the start of the visit: Yes     Services were provided through phone to substitute for in-person clinic visit. Patient and provider were located at their individual homes/office. Note is for  2/4/2021     . Did not have access to Epic at the time of service. 3:25pm-4:18pm      Time spent with Patient: 45 minutes  This is patient's first  [facility] appointment.     Reason for Consult:    Chief Complaint   Patient presents with    Depression    Anxiety    Stress     Referring Provider: Adina Francis MD  [address]  Shant[place]  Pt provided informed consent for the behavioral health program. Discussed with patient model of service to include the limits of confidentiality (i.e. abuse reporting, suicide intervention, etc.) and short-term intervention focused approach. Advised patient/parent to guard AVS and file at home to protect private information. Advised patient/parent to only hand in excuse if needed and not AVS.  Pt indicated understanding. Feedback given to PCP. S:  Patient reports problems with feeling sad, down. Not happy in marriage of 15 years. 8year old daughter. Sad and emotional. I think I need divorce. Working full time as  at the hospital. I like my job, to learn and grow. Served in [employer] for 7 years. Positive for past trauma. O:  MSE:    Mood    Anxious  Guilty  Depressed  Low self-esteem  Humiliation  Irritability  Emptiness  [place]  Despair  Affect    labile affect  Appetite normal  Sleep disturbance Yes  Fatigue Yes  Loss of pleasure Yes  Attention/Concentration    intact  Morbid ideation No  Suicide Assessment    no suicidal ideation      History:    Medications:   Current Outpatient Medications   Medication Sig Dispense Refill    Copper Gluconate (COPPER CAPS) 2 MG CAPS Take 2 mg by mouth daily 90 capsule 2    Melatonin 5 MG CAPS 5-10 mg at bedtime as needed for insomnia  5    albuterol sulfate HFA (PROAIR HFA) 108 (90 Base) MCG/ACT inhaler Inhale 2 puffs into the lungs every 4 hours as needed for Wheezing or Shortness of Breath 2 Inhaler 3    Multiple Vitamins-Minerals (THERAPEUTIC MULTIVITAMIN-MINERALS) tablet Take 1 tablet by mouth daily      Fexofenadine HCl (ALLEGRA PO) Take by mouth       No current facility-administered medications for this visit.         Social History:   Social History     Socioeconomic History    Marital status:      Spouse name: Not on file    Number of children: 1    Years of education: Not on file  Highest education level: Not on file   Occupational History    Occupation:    Social Needs    Financial resource strain: Not on file    Food insecurity     Worry: Not on file     Inability: Not on file   [employer] needs     Medical: Not on file     Non-medical: Not on file   Tobacco Use    Smoking status: Never Smoker    Smokeless tobacco: Never Used   Substance and Sexual Activity    Alcohol use: Yes     Alcohol/week: 0.0 standard drinks     Comment: rarely    Drug use: No    Sexual activity: Yes     Partners: Female   Lifestyle    Physical activity     Days per week: Not on file     Minutes per session: Not on file    Stress: Not on file   Relationships    Social connections     Talks on phone: Not on file     Gets together: Not on file     Attends [employer] service: Not on file     Active member of club or organization: Not on file     Attends meetings of clubs or organizations: Not on file     Relationship status: Not on file    Intimate partner violence     Fear of current or ex partner: Not on file     Emotionally abused: Not on file     Physically abused: Not on file     Forced sexual activity: Not on file   Other Topics Concern    Not on file   Social History Narrative    Not on file       TOBACCO:   reports that he has never smoked. He has never used smokeless tobacco.  ETOH:   reports current alcohol use. Family History:   Family History   Problem Relation Age of Onset    Heart Disease Paternal Grandfather     Prostate Cancer Father     Diabetes Daughter         Type I         A:  Patient presents for consult due to problems with depression, anxiety, multiple stressors, considering divorce. Continued consultation is clinically/medically necessary to support in learning new skills and build confidence to deal better with these issues. Patient response to consults, finds new strategies helpful.      PHQ Scores 2/4/2021 9/18/2019 9/14/2018 8/28/2017 PHQ2 Score 4 0 0 0   PHQ9 Score 13 0 0 0     Interpretation of Total Score Depression Severity: 1-4 = Minimal depression, 5-9 = Mild depression, 10-14 = Moderate depression, 15-19 = Moderately severe depression, 20-27 = Severe depression    GODFREY-7  Feeling nervous, anxious, or on edge: 3-Nearly every day  Not able to stop or control worryin-Over half the days  Worrying too much about different things: 3-Nearly every day  Trouble relaxing: 3-Nearly every day  Being so restless that it's hard to sit still: 1-Several days  Becoming easily annoyed or irritable: 3-Nearly every day  Feeling afraid as if something awful might happen: 3-Nearly every day  GODFREY-7 Total Score: 18    GODFREY-7 score interpretation:  0-4 Subclinical, 5-9 Mild, 10-14 Moderate, 15-21 Severe    Diagnosis:    1. MDD (major depressive disorder), recurrent episode, moderate ([address]    2. GODFREY (generalized anxiety disorder)    3. Situational stress          Diagnosis Date    Anxiety     Bilateral low back pain without sciatica 2018    Chronic back pain     Chronic lung disease of prematurity     Depression with anxiety     Gynecomastia 2003    right side    Insomnia     Perennial allergic rhinitis 2017    Premature infant of 30 weeks gestation     Birth Weight 3lbs 11 oz         Plan:  Pt interventions:  Trained in strategies for increasing balanced thinking, Discussed and set plan for behavioral activation, Trained in relaxation strategies, Discussed self-care (sleep, nutrition, rewarding activities, social support, exercise) and [place]-setting to identify pt's primary goals for [facility] visit / overall health. Affirmation and Normalization. Education about effects of these kind of stressors. Grief support. Pt Behavioral Change Plan:    See patient instructions.

## 2021-02-15 ENCOUNTER — VIRTUAL VISIT (OUTPATIENT)
Dept: PSYCHOLOGY | Age: 36
End: 2021-02-15
Payer: COMMERCIAL

## 2021-02-15 DIAGNOSIS — F43.9 SITUATIONAL STRESS: ICD-10-CM

## 2021-02-15 DIAGNOSIS — F41.1 GAD (GENERALIZED ANXIETY DISORDER): ICD-10-CM

## 2021-02-15 DIAGNOSIS — F33.1 MDD (MAJOR DEPRESSIVE DISORDER), RECURRENT EPISODE, MODERATE (HCC): Primary | ICD-10-CM

## 2021-02-15 PROCEDURE — 90834 PSYTX W PT 45 MINUTES: CPT | Performed by: SOCIAL WORKER

## 2021-02-23 NOTE — PATIENT INSTRUCTIONS
Recommendations to patient:      1. Practice new coping, stress management, relaxation skills at least                   two times a day for at least 10-30 minutes. 2. Find at least one positive outlet per day that makes you feel better. 3. Talk things over with a good friend. Practice letting things go. 4. Stop, breathe, reset. \"I am ok. \"     Scheduled follow up appointment. Call for a sooner appointment if needed or if you need to change or cancel you appointment. [place] [phone]       STRESS MANAGEMENT STRATEGIES    1. Recognize Stress:  Learning to recognize when your body is reacting to stress and identifying our stressors are the first steps in managing stress. 2. Take a Break:  A change of pace, no matter how short, gives us a new outlook on old problems. Take a vacation 20 minutes a day  enjoy a change from the daily routine. 3. Learn to Relax:  Under stress, the muscles in our bodies stay tight. One of the most effective ways to combat tensions is deep muscle relaxation. Other techniques that produce muscle and mental relaxation are yoga, prayer, and deep breathing. 4. Be Nutritionally Aware:  Good nutrition is vital to optimum health, and is especially critical when we are under unusual stress, or going through a major life change. 5. Exercise Regularly:  Just like nutrition, exercise is imperative for maintaining good fitness. Whatever you enjoy  swimming, walking, jogging, aerobic exercise  will help you let off steam and work out stress. 6. Plan your Work:  Tension and anxiety really build up when our work seems endless. Plan your work to use time and energy more efficiently. Take one thing at a time. 7. Talk it Over: This may be the most important thing you can do for yourself if you cant get a handle on things. Find a good listener. Just as a pressure relief valve allows steam to flow out of a pressure cooker and keeps it from blowing up, so talking allows stress to flow out of the body and keeps us from blowing up. 8. Accept What You Cannot Change:  If the problem is beyond your control at this time, try your best to accept it until you can change it. It beats spinning your wheels and getting nowhere. 9. Evaluate Your Perceptions:  What we think is sometimes what we feel. If we constantly think unrealistic or alarming thoughts about ourselves or other folks, then our stress level is increased. 10. Relax Unrealistic Standards:  When we set unrealistic standards for ourselves, we usually can never reach them. If we do, we burn out quickly. Set reasonable goals and standards. 11. Reward Yourself:  Find ways to reward yourself when youve completed a minor or major task. We cannot always depend on others to recognize us, so we must develop our own reward system. 12. Become Assertive: Take steps to solve problems instead of feeling helpless. Distinguishing assertiveness (respecting others rights and your rights) from aggressiveness and passivity can do much to resolve internal stress. 13. Rediscover Humor:  Learn to laugh at yourself and your situation! 14. Increase Pleasurable Activities:  Take time to participate in fun, pleasurable, activities on a regular basis.

## 2021-02-23 NOTE — PROGRESS NOTES
This note will not be viewable in [facility] for the following reason(s). This is a Psychotherapy Note. Behavioral Health Consultation  Ben Pantoja, [address] Consultant  2/23/2021  1:18 PM        TELEHEALTH EVALUATION -- Audio (During PBROC-24 public health emergency)    Patient being evaluated by a phone visit encounter to address concerns as mentioned above. A caregiver was present when appropriate. Due to this being a TeleHealth encounter (During LRLBX-44 public health emergency), evaluation of the following organ systems was limited: Vitals/Constitutional/EENT/Resp/CV/GI//MS/Neuro/Skin/Heme-Lymph-Imm. Pursuant to the emergency declaration under the [address] [place] [address] authority and the [employer] Act, this Virtual Visit was conducted with patient's (and/or legal guardian's) consent, to reduce the patient's risk of exposure to COVID-19 and provide necessary medical care. The patient (and/or legal guardian) has also been advised to contact this office for worsening conditions or problems, and seek emergency medical treatment and/or call 911 if deemed necessary. Patient identification was verified at the start of the visit: Yes     Services were provided through phone to substitute for in-person clinic visit. Patient and provider were located at their individual homes/office. Note is for  2/15/2021     . Did not have access to Epic at the time of service. 8:28am-9:12am      Time spent with Patient:  45 minutes  This is patient's second  [facility] appointment. Reason for Consult:  No chief complaint on file. Referring Provider: No referring provider defined for this encounter. Feedback given to PCP. S:  Patient reports problems with feeling anxious, down. I have thought about a lot of things and nothing is changing from her side. Addressed current and underlying issues, relationship problems, financial pressure, explored and released associated emotions, explored new ways to deal and cope with these problems. I started mya again and it was good. O:  MSE:    Mood    Anxious  Depressed  Low self-esteem  [place]onia  Demoralization  Affect    depressed affect  Appetite normal  Sleep disturbance Yes  Fatigue Yes  Loss of pleasure Yes  Attention/Concentration    intact  Morbid ideation No  Suicide Assessment    no suicidal ideation        A:  Patient presents for consult due to problems with depression, anxiety, multiple stressors, considering divorce. .      Continued consultation is clinically/medically necessary to support in learning new skills and build confidence to deal better with these issues. Patient response to consults, finds new strategies helpful. Diagnosis:    No diagnosis found. Diagnosis Date    Anxiety     Bilateral low back pain without sciatica 9/14/2018    Chronic back pain     Chronic lung disease of prematurity     Depression with anxiety     Gynecomastia 2003    right side    Insomnia     Perennial allergic rhinitis 8/28/2017    Premature infant of 30 weeks gestation     Birth Weight 3lbs 11 oz         Plan:  Pt interventions:  Trained in strategies for increasing balanced thinking, Discussed self-care (sleep, nutrition, rewarding activities, social support, exercise) and Discussed use of imagery, distractions, relaxation, mood management, communication training, questioning unhelpful thinking, problem-solving, and behavioral activation to manage pain. Affirmation and Normalization. Education about effects of these kind of stressors. Grief support.       Pt Behavioral Change Plan:  See Pt Instructions

## 2021-03-04 ENCOUNTER — OFFICE VISIT (OUTPATIENT)
Dept: PSYCHOLOGY | Age: 36
End: 2021-03-04
Payer: COMMERCIAL

## 2021-03-04 DIAGNOSIS — F41.1 GAD (GENERALIZED ANXIETY DISORDER): ICD-10-CM

## 2021-03-04 DIAGNOSIS — F33.1 MDD (MAJOR DEPRESSIVE DISORDER), RECURRENT EPISODE, MODERATE (HCC): Primary | ICD-10-CM

## 2021-03-04 DIAGNOSIS — F43.9 SITUATIONAL STRESS: ICD-10-CM

## 2021-03-04 PROCEDURE — 90834 PSYTX W PT 45 MINUTES: CPT | Performed by: SOCIAL WORKER

## 2021-03-04 NOTE — PATIENT INSTRUCTIONS
Recommendations to patient:      1. Practice new coping, stress management, relaxation skills at least                   two times a day for at least 10-30 minutes. 2. Find at least one positive outlet per day that makes you feel better. 3. Talk things over with a good friend. Practice letting things go. 4. Stop, breathe, reset. \"I am ok. \"   5. I could do something with my brother. Scheduled follow up appointment. Call for a sooner appointment if needed or if you need to change or cancel you appointment. Flaca Quezada [phone]      Grief Tips - Help for Those Who Mourn    The following are many ideas to help people who are mourning a loved ones death. Different kinds of losses dictate different responses, so not all of these ideas will suit everyone. Likewise, no two people grieve alike - what works for one may not work for another. Treat this list for what it is; a gathering of assorted suggestions that various people have tried with success. Perhaps what helped them will help you. The emphasis here is on specific, practical ideas. 1. Talk regularly with a friend. Talking with another about what you think and feel is one of the best things you can do for yourself. It helps relieve some of the pressure you may feel, it can give you a sense of perspective, and it keeps you in touch with others. Look for someone whos a good listener and who has a caring soul. Then speak whats on your mind and in your heart. If this feels one-sided let that be okay for this period of your life. Chances are the other person will find meaning in what theyre doing, and time will come when youll have the chance to be a good listener for someone else. Youll be a better listener then, if youre a good talker now. 2. Walk. Go for walks outside every day if you can. Dont overdo it, but walk briskly enough that it feels invigorating.   Sometimes try walking slowly enough so you can look carefully at what you see. Observe what nature has to offer you, what it can teach you. Enjoy as much as you are able to of the sights and sounds that come your way. If you like, walk with another person. 3. Carry or wear a linking object. Carry something in your pocket or purse that reminds you of the one who  - a keepsake they gave you perhaps, or small object they once carried or used or a memento you select for just this purpose. You might wear a piece of their jewelry in the same way. Whenever you want, reach for gaze upon this object and remember what it signifies. 4. Visit the grave. Not all people prefer to do this. But if it feels right to you, then do so. Dont let others convince you this is a morbid thing to do. Spend whatever time feels right there. Stand or sit in the quietness and do what comes naturally: be silent or talk, breathe deeply or cry, recollect or pray. You may wish to add your distinctive touch to the gravesite - straighten it a bit, or add little signs of your love. 5. Create a memory book. Compile photographs, which document your loved ones life. Arrange them into some sort of order so they tell a story. Add other elements if you want: diplomas, newspaper clippings, awards, accomplishments, and reminders of significant events. Put all this in a special binder and keep it for other people to look at if they wish. Go through it on your own if you desire. Reminisce as you do so. 6. Recall your dreams. Your dreams often have important things to say about your feelings and about your relationship with the one who . Your dreams may be scary or sad, especially early on. They may seem weird or crazy to you. You may find that your loved one appears in your dreams. Accept your dreams for what they are and see what you can learn from them. No one knows that better than you. 7. Tell people what helps you and what doesnt.   People around you may not understand what you need. So tell them. If hearing your loved ones name spoken aloud by others feels good, say so. If you need more time alone, or assistance with chores youre unable to complete, or an occasional hug, be honest.  People cant read your mind, so youll have to speak it. 8. Write things down. Most people who are grieving become more forgetful than usual.  So help yourself remember what you want by keeping track of it on paper or with whatever system works best for you. This may include writing down things you want to preserve about the person who has . 9. Ask for a copy of the [place]. If the  liturgy or memorial service holds special meaning for you because of what was spoken or read, ask for the words. Whoever participated in that ritual will feel gratified that what they prepared was appreciated. Turn to these words whenever you want. Some people find these thoughts provide even more help weeks and months after the service. 10. Remember the serenity prayer. This prayer is attributed to theologian Naresh Robison, but its actually an ancient  [facility]. It has brought comfort and support to many that have suffered various kinds of afflictions. 11. Create a memory area at home. In a space that feels appropriate, arrange a small table that honors the person: a framed photograph or two, perhaps a prized possession or award or something they created or something they loved. This might be placed on a small table, a mantel or a desk. Some people like to use a grouping of candles, representing not just the person who  but others who have  as well. In that case a variety of candles can be arranged each representing a unique life. 12. Drink water. Grieving people can easily become dehydrated. Crying can naturally lead to that. And with your normal routines turned upside down, you may simply not drink as much or as regularly as you did before this death. Make this a way you care for yourself. 13. Use your hands. Sometimes theres value in doing repetitive things with your hands, something you dont have to think about very much because it becomes second nature. Knitting and crocheting are like that. So are carving, woodworking, polishing, solving jigsaw puzzles, painting, braiding, shoveling, washing and countless other activities. 15. Give yourself respites from your grief. Just because youre grieving doesnt mean you must always be feeling sad or     forlorn. Theres value in sometimes consciously deciding that youll think about something else for awhile, or that youll do something youve always enjoyed doing. Sometimes this happens naturally and its only later you realize that your grief has taken a back seat. Let it, this is not an indication you love that person any less or that youre forgetting them. Its a sign that youre human and you need relief from the unrelenting pressure. It can also be a healthy sign youre healing. 15. See a grief counselor. If youre concerned about how youre feeling and how well youre adapting make an appointment   with a counselor who specializes in grief. Often youll learn what you need both about grief and about yourself as a griever in only a few sessions. Ask questions of the counselor before you sign on. What specific training does he or she have? What accreditation? A person who is a family therapist or a psychologist doesnt necessarily understand the unique issues of someone in grief. 12. Begin your day with your loved one. If your grief is young, youll probably wake up thinking of that person anyway. So why not decide that youll include her or him from the start? Focus this time in a positive way. Bring to your mind fulfilling memories. Recall lessons that this person taught you, gifts he or she gave you.   Think about how you can spend your day in ways that would be keeping in with your loved ones best self and with your best self. Then carry that best self with you through your day. 16. Invite some one to be your telephone vishnu. If your grief and sadness hit you especially hard at times and you have no   one nearby to turn to, ask someone you trust to be your telephone vishnu. Ask their permission for you to call them whenever you feel youre at loose ends, day or night. Then put their number beside your phone and call them if you need them. Dont abuse the privilege, of course. And covenant that some day it will be payback time - someday youll make yourself available to help someone else in the same way youve been helped. That will help you accept the care youre receiving. 18. Avoid certain people if you must.  No one likes to be unfriendly or cold. But if there are people in your life who make it  very difficult for you to do your grieving then do what you can to stay out of their way. Some people may lecture you or belittle you. 23. Donate their possessions meaningfully. Whether you give your loved ones personal possessions to someone you know   or to a stranger, find ways to pass these things along so that others might benefit from them. Family members of friends might like to receive keepsakes. They or others might deserve tools, utensils, books or sporting equipment. [facility] organizations can put clothes to good use. Some wish to do this quickly following the death, while others wish to wait awhile. 21. Donate in the others name. Honor the [employer] and spirit by giving a gift or gifts to a cause the other would   appreciate. A favorite aga? A local ? A building project? Extend that persons influence even further. 21. Create or commission a memory quilt. Sew or invite others to sew with you, or hire someone to sew for you.     However you get it completed, put together a wall hanging or a bedroom quilt that remembers the important life events of the one who . Take your time doing this. Make it what it is, a labor of love. 22. Take a yoga class. People of almost any age can do yoga. More than conditioning your body, it helps you relax and focus your mind. It can be woven into a practice of mediation. Its a gentle art for that time in your life when you deserve gentleness all around you. 23. Connect on the Internet. If youre [employer], search the Internet. Deja Saldivar find many resources for people in grief,   as well as the opportunity to chat with fellow grievers. You can link up with others without leaving your home. Deja Saldivar also find more to expand your horizons as a person who is beginning to grow. 24. Speak to a cleargyperson. If youre searching for answers to the larger questions about life and death, Buddhist and spirituality, consider talking with a representative of your destinee, or even anothers destinee. Consider becoming a spiritual friend with another and making your time of grieving a time of personal exploring. Read of how others have responded to a loved ones death. You may feel that your own grief is all you can handle. But if youd like to look at the ways others have done it, try:  Beyond Grief:  A Guide for Recovering from the Death of a Loved One by Jayleen 40 by En Bee and Des Chin  The Grief Recovery Handbook: The Action Program for Moving Beyond Death, Divorce, & Other Losses by Marshall NAIR Grief Observed by Karyn Riddle  by Rikki Howell When Good-Bye Is Forever by Lizett Harder  Men and Grief by Jessica De León or [address] for a Son. There are many others. Check with a . 22. Learn about your loved one from others. Listen to the stories others have to tell about the one, who , stories youre familiar with and those youve never heard before.   Spend time with their friends, schoolmates or colleagues. Invite them into your home. Solicit the writings of others. Preserve whatever you find out. Celebrate your time together. 26. Take a day off. When the mood is just right, take a one-day vacation. Do whatever you want, or dont do whatever you want. Travel somewhere or stay inside by yourself. Be very active or dont do anything at all. Just make it your day, whatever that means for you. 32. Invite someone to give you feedback. Select someone you trust, preferably someone familiar with the working of grief, to give you his or her reaction when you ask for it. If you want to check out how clearly youre thinking, how accurately youre remembering, how effectively youre coping, go to that person. Pose your questions, then listen to their responses. What you choose to do with that information will be up to you. 28. Vent your anger rather than hold it in. You may feel awkward being angry when youre grieving, but anger is a common reaction. The expression holds true: anger is best out floating rather than in bloating. Even if you feel a bit ashamed as you do it, find ways to get it out of your system. [place], even if its in an empty house. Cry. Hit something soft. Throw eggs at something hard. Vacuum up a storm. Resist the temptation to be proper. 29. Give thanks every day. Whatever has happened to you, you still have things to be thankful for. Perhaps its your memories, your remaining family, your support, your work; you own health - all sorts of things. Draw your attention to those parts of life that are worth appreciating, then appreciate them. 30. Monitor signs of dependency. While its normal to become more dependent upon others for awhile immediately after a death, it will not be helpful to continue in that role long-term. Watch for signs that youre prolonging your need for assistance.   Congratulate yourself when you do things for yourself.

## 2021-03-18 ENCOUNTER — OFFICE VISIT (OUTPATIENT)
Dept: PSYCHOLOGY | Age: 36
End: 2021-03-18
Payer: COMMERCIAL

## 2021-03-18 DIAGNOSIS — F33.1 MDD (MAJOR DEPRESSIVE DISORDER), RECURRENT EPISODE, MODERATE (HCC): Primary | ICD-10-CM

## 2021-03-18 DIAGNOSIS — F41.1 GAD (GENERALIZED ANXIETY DISORDER): ICD-10-CM

## 2021-03-18 DIAGNOSIS — F43.9 SITUATIONAL STRESS: ICD-10-CM

## 2021-03-18 PROCEDURE — 90834 PSYTX W PT 45 MINUTES: CPT | Performed by: SOCIAL WORKER
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
## 2021-03-18 NOTE — PATIENT INSTRUCTIONS
Recommendations to patient:                            1. Practice new coping, stress management, relaxation skills at least                   two times a day for at least 10-30 minutes. 2. Find at least one positive outlet per day that makes you feel better. 3. Talk things over with a good friend. Practice letting things go. 4. Stop, breathe, reset. \"I am ok. \"              5. I could do something with my brother. 6. I am fine the way I am.                 Scheduled follow up appointment.     Call for a sooner appointment if needed or if you need to change or cancel you appointment.     [place] [phone]        When something really bad happens, everything changes. The way we feel, the way we think, the way we deal with things. We feel anxious, on edge, frustrated, upset, sad, defensive, cornered. We question everything. The concepts of how the world is supposed to be, are shaken. There will be ups and downs, and they can be intense. Those moments pass, we can learn to cope with them  Goal of healing is to put the inner world back in order so that we can deal with the outer world in a healthy, safe, secure way. Trauma changes the brain. According to world-renowned trauma expert and Bianca richardson [place], once you've been traumatized, you live in a different world. You see the world differently and you experience yourself differently. You're a changed person. So how do we help our clients not just deal with the memory of the past, but actually regain ownership of their mind and body in the present? Traumatic Events     Responses to Trauma    Management    Seeking Help  What Are Traumatic Events? A traumatic event is an incident that causes physical, emotional, spiritual, or psychological harm. The person experiencing the distressing event may feel threatened, anxious, or frightened as a result.  In some cases, they may not know how to respond, or may be in denial about the effect such an event has had. The person will need support and time to recover from the traumatic event and regain emotional and mental stability. Examples of traumatic events include:   death of family member, lover, friend, teacher, or pet    divorce    physical pain or injury (e.g. severe car accident)    serious illness    war    natural disasters    terrorism    moving to a new location    parental abandonment    witnessing a death    rape    domestic abuse    [facility] stay  How Do People Respond to Traumatic Events? People respond to traumatic events in different ways. Often there are no visible signs, but people may have serious emotional reactions. Shock and denial shortly after the event is a normal reaction. Shock and denial are often used to protect oneself from the emotional impact of the event. You may feel numb or detached. You may not feel the events full intensity right away. Once you have moved past the initial shock, responses to a traumatic event may vary. Common responses include:   irritability    sudden, dramatic mood changes    anxiety and nervousness    anger    denial    depression    flashbacks or repeated memories of the event    difficulty concentrating    altered sleeping or insomnia    changes in appetite    intense fear that the traumatic event will recur, particularly around anniversaries of the event (or when going back to the scene of the original event)    withdrawal and isolation from day-to-day activities    physical symptoms of stress, such as headaches and nausea    worsening of an existing medical condition  A condition known as post-traumatic stress disorder (PTSD) can sometimes occur after you experience a life-threatening event or witness a death. PTSD is a type of anxiety disorder that affects stress hormones and changes the bodys response to stress.  People with this disorder require strong social support and ongoing therapy. Many veterans returning from war suffer from PTSD. PTSD can cause an intense physical and emotional response to any thought or memory of the event. It can last for months or years following trauma. Experts do not know why some people experience PTSD after a traumatic event while others do not. A history of trauma, along with other physical, genetic, psychological, and social factors may play a role in developing PTSD. How Can You Manage Traumatic Stress? There are several ways to help restore your emotional stability after a traumatic event:   Communicate the experience with family or close friends or in a diary or online journal.   Morales Give yourself time and recognize that you cant control everything.  Ask for support from people who care about you or attend a local support group for people who have had a similar experience. Morales Find a support group led by a trained professional who can facilitate discussions.  Eat a well-balanced diet, exercise, get adequate rest, and avoid alcohol and drugs.  Maintain a daily routine with structured activities.  Avoid major life decisions, such as changing careers or moving soon after the event.  Pursue hobbies or other interests, but do not overdo it.  Spend time with others to avoid becoming withdrawn, even if you do not feel up to it. When Should You Contact a Professional?  You should seek professional help if symptoms persist and interfere with day-to-day activities, school or work performance, or personal relationships. Signs that a child may need professional help to cope with a traumatic event include:   emotional outbursts    aggressive behavior    withdrawal    persistent difficulty in sleeping    continued obsession with the traumatic event    serious problems at school   Psychologists and mental health providers can work with people to find ways to cope with stress.  They can help both children and their parents understand how to cope with the emotional impact of a traumatic event. Bereavement, Grief & Mourning        Bereavement is the state of having lost a significant other to death. Grief is the personal response to the loss. Mourning is the public expression of that loss. What is Normal Grief? Grief reactions vary depending on who we are, who we lost, our relationship with that person, the circumstances around their passing, and how much their loss affects our day-to-day functioning. Different people may express grief differently and you may even have different grief responses between one loss and another. Reactions to grief and loss include not just emotional symptoms, but also behavioral and physical symptoms. These reactions can often change over time. All are normal for a short period of time. Emotional Behavioral Physical   Shock, denial,                   numbness Crying unexpectedly Exhaustion/Fatigue      Sadness, anxiety, guilt,       fear Sleep changes (increase or decrease) Decreased energy        Anger (at others or        God), Not eating/Weight changes Memory problems        Irritability, frustration Withdrawing from others Stomach and intestinal upset    Restlessness, difficulty concentrating, trouble making decisions Pain and headaches     Symptoms that are not normal and may signal the need to talk to a professional include:  Use of drugs, alcohol, violence, and thoughts of killing oneself. The duration of grief varies from person to person. Current research shows that the average recovery time is 18 to 24 months. Also, grief reactions can be stronger around anniversary or other significant dates, such as the anniversary of the persons death, birthdays, and holidays. The Stages of Grief  Grief therapists often describe stages of grief outlined by the research of Dr. Pal Zarate. These stages do not always go in order.   You may move back and forth among some of the stages and may even skip some of them. These stages are meant as a guide to help you understand your reactions and those of others who are grieving.  - Denial:  Denial (not acknowledging the loss) can help contain the shock of loss. Denial can act as a safety mechanism to block out grief until we are ready to handle it. - Sadness and depression:  Deep, intense grief and mourning appear during this stage. When the full understanding of our loss comes, it can seem overwhelming. During this stage, you may cry often and unexpectedly. You may not want to be around people or to do things that you normally enjoy. During this stage, it is best to remain as active as possible and to seek supportive people who will allow you to say what you need to or to cry when you need to. It is important to allow yourself to work through your full range and experience of emotions. - Anger:  Rage and anger can be intense toward the person who , toward friends and relatives, and even toward God. It is important to have an outlet to release anger through activities such as exercise, hobbies, or through therapy. Guilt, shame, and blame are feelings that need to be addressed, especially if it is toward you. - Acceptance: This stage includes coming to terms with the loss. It does not mean that you have found the answers to your questions or that you stop thinking about the person who is gone. It does signify a reinvestment in life and a willingness to readjust to your new circumstances while carrying the memory of your loved one with you. How to Help Yourself  1. Give yourself time to grieve. It is normal and important to express your grief and to work through the concerns that arise for you at this time. Stuffing your feelings may not be helpful and may delay or prolong your grief. 2. Find supportive people to reach out to during your grief.   This is the time when the support of others may be the most helpful. Dont be afraid to tell them how they can best help, even if it means just listening. It is often very helpful to talk about your loss with people who will allow you to express your emotions. 3. Take care of your health. Often after a loss, we stop doing the things we need to for health care, such as exercising, eating correctly, keeping Dr. appointments, or taking prescribed medications. If you are on a health care regimen, it is important to continue to adhere to your treatment. 4. Postpone major life changes. Give yourself time to adjust to your loss before making plans to change jobs, move or sell your home, remarry, etc.  Grief can sometimes cloud your judgment and ability to make decisions. 5. Consider keeping a journal.  It is often helpful to write or tell the story of your loss and what it means to you as a way to work through your feelings. 6. Participate in activities. Staying active through exercise, enjoyable activities, outings with supportive others, or even starting new hobbies can help us get through tough times while providing opportunities for constructive development and use of energy. 7. Find a way to memorialize your loved one. Planting a tree or garden in the name of your loved one, dedicating a work to their memory, contributing to a aga in their name, and other such activities can be helpful. 8. Consider joining grief-support groups or contacting a grief counselor for additional support and help. Remember that depressive symptoms (feeling sad) are a fundamental part of normal bereavement. Staying active and finding support from others can help you to work through the grief process. Grief Tips - Help for Those Who Mourn    The following are many ideas to help people who are mourning a loved ones death. Different kinds of losses dictate different responses, so not all of these ideas will suit everyone.   Likewise, no two people quietness and do what comes naturally: be silent or talk, breathe deeply or cry, recollect or pray. You may wish to add your distinctive touch to the gravesite - straighten it a bit, or add little signs of your love. 5. Create a memory book. Compile photographs, which document your loved ones life. Arrange them into some sort of order so they tell a story. Add other elements if you want: diplomas, newspaper clippings, awards, accomplishments, and reminders of significant events. Put all this in a special binder and keep it for other people to look at if they wish. Go through it on your own if you desire. Reminisce as you do so. 6. Recall your dreams. Your dreams often have important things to say about your feelings and about your relationship with the one who . Your dreams may be scary or sad, especially early on. They may seem weird or crazy to you. You may find that your loved one appears in your dreams. Accept your dreams for what they are and see what you can learn from them. No one knows that better than you. 7. Tell people what helps you and what doesnt. People around you may not understand what you need. So tell them. If hearing your loved ones name spoken aloud by others feels good, say so. If you need more time alone, or assistance with chores youre unable to complete, or an occasional hug, be honest.  People cant read your mind, so youll have to speak it. 8. Write things down. Most people who are grieving become more forgetful than usual.  So help yourself remember what you want by keeping track of it on paper or with whatever system works best for you. This may include writing down things you want to preserve about the person who has . 9. Ask for a copy of the Jackie's. If the  liturgy or memorial service holds special meaning for you because of what was spoken or read, ask for the words.   Whoever participated in that ritual will feel gratified that what they prepared was appreciated. Turn to these words whenever you want. Some people find these thoughts provide even more help weeks and months after the service. 10. Remember the serenity prayer. This prayer is attributed to theologian Chelsey Landa, but its actually an ancient  [place]. It has brought comfort and support to many that have suffered various kinds of afflictions. 11. Create a memory area at home. In a space that feels appropriate, arrange a small table that honors the person: a framed photograph or two, perhaps a prized possession or award or something they created or something they loved. This might be placed on a small table, a mantel or a desk. Some people like to use a grouping of candles, representing not just the person who  but others who have  as well. In that case a variety of candles can be arranged each representing a unique life. 12. Drink water. Grieving people can easily become dehydrated. Crying can naturally lead to that. And with your normal routines turned upside down, you may simply not drink as much or as regularly as you did before this death. Make this a way you care for yourself. 13. Use your hands. Sometimes theres value in doing repetitive things with your hands, something you dont have to think about very much because it becomes second nature. Knitting and crocheting are like that. So are carving, woodworking, polishing, solving jigsaw puzzles, painting, braiding, shoveling, washing and countless other activities. 15. Give yourself respites from your grief. Just because youre grieving doesnt mean you must always be feeling sad or     forlorn. Theres value in sometimes consciously deciding that youll think about something else for awhile, or that youll do something youve always enjoyed doing. Sometimes this happens naturally and its only later you realize that your grief has taken a back seat.   Let larger questions about life and death, Mandaen and spirituality, consider talking with a representative of your destinee, or even anothers destinee. Consider becoming a spiritual friend with another and making your time of grieving a time of personal exploring. Read of how others have responded to a loved ones death. You may feel that your own grief is all you can handle. But if youd like to look at the ways others have done it, try:  Beyond Grief:  A Guide for Recovering from the Death of a Loved One by Jayleen 40 by Sanna Griffith and Abbie Fernandez  The Grief Recovery Handbook: The Action Program for Moving Beyond Death, Divorce, & Other Losses by Jj Augustus   A Grief Observed by Pleitez Corwin  by Ashley Frances When Good-Bye Is Forever by Bony Casillas  Men and Grief by Elias Maddox or Deena Rue Phillip Ruiz for a Son. There are many others. Check with a . 22. Learn about your loved one from others. Listen to the stories others have to tell about the one, who , stories youre familiar with and those youve never heard before. Spend time with their friends, schoolmates or colleagues. Invite them into your home. Solicit the writings of others. Preserve whatever you find out. Celebrate your time together. 26. Take a day off. When the mood is just right, take a one-day vacation. Do whatever you want, or dont do whatever you want. Travel somewhere or stay inside by yourself. Be very active or dont do anything at all. Just make it your day, whatever that means for you. 32. Invite someone to give you feedback. Select someone you trust, preferably someone familiar with the working of grief, to give you his or her reaction when you ask for it. If you want to check out how clearly youre thinking, how accurately youre remembering, how effectively youre coping, go to that person.   Pose your questions, then listen to their responses. What you choose to do with that information will be up to you. 28. Vent your anger rather than hold it in. You may feel awkward being angry when youre grieving, but anger is a common reaction. The expression holds true: anger is best out floating rather than in bloating. Even if you feel a bit ashamed as you do it, find ways to get it out of your system. Alcorn, even if its in an empty house. Cry. Hit something soft. Throw eggs at something hard. Vacuum up a storm. Resist the temptation to be proper. 29. Give thanks every day. Whatever has happened to you, you still have things to be thankful for. Perhaps its your memories, your remaining family, your support, your work; you own health - all sorts of things. Draw your attention to those parts of life that are worth appreciating, then appreciate them. 30. Monitor signs of dependency. While its normal to become more dependent upon others for awhile immediately after a death, it will not be helpful to continue in that role long-term. Watch for signs that youre prolonging your need for assistance. Congratulate yourself when you do things for yourself.

## 2021-04-01 ENCOUNTER — OFFICE VISIT (OUTPATIENT)
Dept: PSYCHOLOGY | Age: 36
End: 2021-04-01
Payer: COMMERCIAL

## 2021-04-01 DIAGNOSIS — F43.9 SITUATIONAL STRESS: ICD-10-CM

## 2021-04-01 DIAGNOSIS — F41.1 GAD (GENERALIZED ANXIETY DISORDER): ICD-10-CM

## 2021-04-01 DIAGNOSIS — F33.0 MDD (MAJOR DEPRESSIVE DISORDER), RECURRENT EPISODE, MILD (HCC): Primary | ICD-10-CM

## 2021-04-01 PROCEDURE — 90834 PSYTX W PT 45 MINUTES: CPT | Performed by: SOCIAL WORKER
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
## 2021-04-01 NOTE — PATIENT INSTRUCTIONS
Common Unhealthy Beliefs could be stimulating my reaction? What significance am I telling myself this situation could have? What do I think might be at stake here? Questions to Identify Predictions    What am I thinking is going to or might happen because of this situation? Am I thinking any What ifs from this situation?   What am I telling myself this situation might mean for my future? What am I predicting others might do because of this situation? What am I predicting I might do because of this situation? Questions to Identify Evaluations    How wonderful - awful am I rating this event? How wonderful - awful am I rating myself because of this situation? How wonderful - awful am I rating others because of this situation? If my predictions occur, how wonderful - awful do I think that will be? If my predictions dont occur, how wonderful - awful do I think that will be? Has your self-esteem gone into hibernation with the bears or fallen by the wayside like the leaves on the trees? Before you let one more moment slip away, put the spring back into your step without waiting for the thaw. Whether your self-esteem is still going strong, fading, has disappeared all together, it makes sense for all us to nurture our confidence. Confidence equips us to face the tough and unpleasant times and allows for mendez and pleasure when living is easy. Here are 6 ways to give a boost to your self-esteem:  1. Recognize and embrace your positive qualities. Make a list of all your assets including skills, experiences, physical and social resources, talents, and anything else that makes you feel good about yourself. Add to the list the compliments that others have given you as well. Reminding yourself of all your assets is a sure confidence booster. 2. Accept that you are a desirable package rather than any one individual item. Accept that you are not perfect.  Don't let any one particular shortcoming negate Having trouble? Ask someone that matters to you to name five things you have to feel good about yourself. Try this every day for a week.     Jin Cruz., a licensed psychologist, is the co-author of Think Confident, Be Confident

## 2021-04-11 NOTE — PROGRESS NOTES
Progress Note      Pt Name: Yolanda Evans  YOB: 1985  MRN: [id number]    Date of evaluation: 4/12/2021  History Obtained From:  patient, electronic medical record    CHIEF COMPLAINT:    Chief Complaint   Patient presents with    Follow-up     Leukopenia, unspecified type     Current active problems  Leukopenia  Mild copper deficiency    HISTORY OF PRESENT ILLNESS:    Yolanda Evans is a 28 y.o.  male who was seen initially in the office on 5/11/2020 referred by Dr. Sheila Potter for evaluation of a persistent mild leukopenia. He was found to have copper deficiency and was on oral copper 2 mg daily. He now takes a multivitamin with copper. He denies any recurrent infections. He is currently on a keto diet trying to lose weight he has been studying [employer] with plans on going to a tournament. [place] in [place]. He did have the [facility] COVID-19 vaccinations and the second 1 was administered January 2021. He does have albuterol inhaler that he uses as needed for allergies but does not use it basically. He takes Allegra regularly. He takes melatonin at night. HEMATOLOGY HISTORY: Leukopenia, copper deficiency  Daljit Burgos was seen in initial hematology consultation on 5/11/2020 referred by Dr. Kathy Hayden for evaluation of a persistent mild leukopenia.          Daljit Burgos reports that he has known that he has had a low white count for some time. He denies any significant chronic or recurrent infections.   He does have seasonal allergy issues and does have some pulmonary issues at times but has not had recurrent pneumonia issues.     He denies any night sweats, weight loss, extreme fatigue, chronic pruritus.     Prior to his initial visit he did have possibly a food allergy- some spice from a [place], that caused burning and itching across his shoulders and he is currently on prednisone.     He denied any abdominal pain specifically in the left upper quadrant.     His initial CBC in the office on 5/11/2020 revealed that he has WBC was actually normal at 6.31 with a normal percent differential with neutrophils 57.8%, lymphocytes 33.3%, monocytes 6.8%, eosinophils 1.6% and basophil 0.5%. Hgb was normal at 15.9 with MCV 91.3 and PLT was 206,000.     His CBC on his initial visit 5/11/2020 above revealed that his WBC was completely normal.  This could be affected from the steroid today we was on prior to that visit. Physical examination does not reveal any peripheral lymphadenopathy or splenomegaly. He does not have any B symptoms. Serology 5/11/2020  QI - IgG 973, IgA 144, IgM 102 - WNL   SPEP - no M spike with immunofixation negative  Free serum light chains - kappa 8.3, lambda 10.4, K/L ratio 0.8 - WNL  Copper - 70 ()  Zinc - 119  B12 - 455  Folate - 14.3  CMP - WNL  HIV 1/2 - nonreactive    He was started on copper 2 mg daily. His CBC at follow-up on 8/3/2020 revealed that his WBC had decreased to 3.83 with ANC 1.78. I discussed the fact that his [address] was adequate. Percentage differential was within normal limits. His Hgb and PLT continued to be completely normal.  I discussed potential need for bone marrow aspiration biopsy, reason for the procedure, risk, alternatives with him. After discussion we sent peripheral blood smear for evaluation to [facility] he desired to continue to be monitored conservatively. He did not have any recurrent infections. He does not have any splenomegaly, peripheral adenopathy on exam.    PERIPHERAL BLOOD SMEAR 8/3/2022 [facility]  · Normocytic normochromic red blood cells with no significant anisopoikilocytosis  · There is mild leukopenia. Neutrophils have normal morphology and appear to be adequate in number. Monocytes are not increased  · Lymphocytes have heterogenous morphologic features. No left shift and no circulating blasts.   · Platelets appear to be adequate in number with occasional large photophobia, discharge and itching. Respiratory: Negative for cough, shortness of breath and wheezing. Cardiovascular: Negative for chest pain, palpitations and leg swelling. Gastrointestinal: Negative for abdominal distention, abdominal pain, blood in stool, constipation, diarrhea, nausea and vomiting. Endocrine: Negative for cold intolerance, heat intolerance, polydipsia and polyuria. Genitourinary: Negative for difficulty urinating, dysuria, hematuria and urgency. Musculoskeletal: Negative for arthralgias, back pain, joint swelling and myalgias. Skin: Negative for color change and rash. Allergic/Immunologic: Positive for environmental allergies and food allergies ([place] food ). Neurological: Negative for dizziness, tremors, seizures, syncope and light-headedness. Hematological: Negative for adenopathy. Does not bruise/bleed easily. Psychiatric/Behavioral: Negative for behavioral problems and suicidal ideas. The patient is not nervous/anxious. All other systems reviewed and are negative. Objective   /88   Pulse 77   Temp 96.9 °F (36.1 °C)   Ht 5' 9\" (1.753 m)   Wt 187 lb 4.8 oz (85 kg)   SpO2 95%   BMI 27.66 kg/m²     PHYSICAL EXAM:  Physical Exam  Vitals signs reviewed. Constitutional:       General: He is not in acute distress. Appearance: He is well-developed. HENT:      Head: Normocephalic and atraumatic. Nose: Nose normal.   Eyes:      General: No scleral icterus. Conjunctiva/sclera: Conjunctivae normal.   Neck:      Vascular: No JVD. Trachea: No tracheal deviation. Cardiovascular:      Rate and Rhythm: Normal rate and regular rhythm. Heart sounds: Normal heart sounds. No murmur. Pulmonary:      Effort: Pulmonary effort is normal. No respiratory distress. Breath sounds: Normal breath sounds. No wheezing. Abdominal:      General: Bowel sounds are normal. There is no distension. Palpations: Abdomen is soft. There is no mass.

## 2021-04-12 ENCOUNTER — OFFICE VISIT (OUTPATIENT)
Dept: HEMATOLOGY | Age: 36
End: 2021-04-12
Payer: COMMERCIAL

## 2021-04-12 ENCOUNTER — HOSPITAL ENCOUNTER (OUTPATIENT)
Dept: INFUSION THERAPY | Age: 36
Discharge: HOME | End: 2021-04-12
Payer: COMMERCIAL

## 2021-04-12 VITALS
TEMPERATURE: 96.9 F | OXYGEN SATURATION: 95 % | WEIGHT: 187.3 LBS | HEIGHT: 69 IN | DIASTOLIC BLOOD PRESSURE: 88 MMHG | BODY MASS INDEX: 27.74 KG/M2 | HEART RATE: 77 BPM | SYSTOLIC BLOOD PRESSURE: 130 MMHG

## 2021-04-12 DIAGNOSIS — D72.819 LEUKOPENIA, UNSPECIFIED TYPE: Primary | ICD-10-CM

## 2021-04-12 DIAGNOSIS — E61.0 COPPER DEFICIENCY: ICD-10-CM

## 2021-04-12 DIAGNOSIS — D72.818 OTHER DECREASED WHITE BLOOD CELL COUNT: ICD-10-CM

## 2021-04-12 LAB
BASOPHILS ABSOLUTE: 0.02 K/UL (ref 0.01–0.08)
BASOPHILS RELATIVE PERCENT: 0.4 % (ref 0.1–1.2)
EOSINOPHILS ABSOLUTE: 0.05 K/UL (ref 0.04–0.54)
EOSINOPHILS RELATIVE PERCENT: 1 % (ref 0.7–7)
HCT VFR BLD CALC: 50 % (ref 40.1–51)
HEMOGLOBIN: 17.4 G/DL (ref 13.7–17.5)
LYMPHOCYTES ABSOLUTE: 1.52 K/UL (ref 1.18–3.74)
LYMPHOCYTES RELATIVE PERCENT: 30.9 % (ref 19.3–53.1)
MCH RBC QN AUTO: 31 PG (ref 25.7–32.2)
MCHC RBC AUTO-ENTMCNC: 34.8 G/DL (ref 32.3–36.5)
MCV RBC AUTO: 89.1 FL (ref 79–92.2)
MONOCYTES ABSOLUTE: 0.42 K/UL (ref 0.24–0.82)
MONOCYTES RELATIVE PERCENT: 8.5 % (ref 4.7–12.5)
NEUTROPHILS ABSOLUTE: 2.91 K/UL (ref 1.56–6.13)
NEUTROPHILS RELATIVE PERCENT: 59.2 % (ref 34–71.1)
PDW BLD-RTO: 12.2 % (ref 11.6–14.4)
PLATELET # BLD: 191 K/UL (ref 163–337)
PMV BLD AUTO: 11.2 FL (ref 7.4–10.4)
RBC # BLD: 5.61 M/UL (ref 4.63–6.08)
WBC # BLD: 4.92 K/UL (ref 4.23–9.07)

## 2021-04-12 PROCEDURE — 99213 OFFICE O/P EST LOW 20 MIN: CPT | Performed by: PHYSICIAN ASSISTANT

## 2021-04-12 PROCEDURE — 99211 OFF/OP EST MAY X REQ PHY/QHP: CPT

## 2021-04-12 PROCEDURE — 85025 COMPLETE CBC W/AUTO DIFF WBC: CPT

## 2021-04-12 ASSESSMENT — ENCOUNTER SYMPTOMS
VOMITING: 0
COUGH: 0
ABDOMINAL PAIN: 0
WHEEZING: 0
BACK PAIN: 0
BLOOD IN STOOL: 0
NAUSEA: 0
TROUBLE SWALLOWING: 0
CONSTIPATION: 0
ABDOMINAL DISTENTION: 0
SORE THROAT: 0
DIARRHEA: 0
PHOTOPHOBIA: 0
SHORTNESS OF BREATH: 0
EYE DISCHARGE: 0
VOICE CHANGE: 0
EYE ITCHING: 0
COLOR CHANGE: 0

## 2021-04-19 ENCOUNTER — OFFICE VISIT (OUTPATIENT)
Dept: PSYCHOLOGY | Age: 36
End: 2021-04-19
Payer: COMMERCIAL

## 2021-04-19 DIAGNOSIS — F41.1 GAD (GENERALIZED ANXIETY DISORDER): Primary | ICD-10-CM

## 2021-04-19 DIAGNOSIS — F33.0 MDD (MAJOR DEPRESSIVE DISORDER), RECURRENT EPISODE, MILD (HCC): ICD-10-CM

## 2021-04-19 DIAGNOSIS — F43.9 SITUATIONAL STRESS: ICD-10-CM

## 2021-04-19 PROCEDURE — 90834 PSYTX W PT 45 MINUTES: CPT | Performed by: SOCIAL WORKER

## 2021-04-19 NOTE — PATIENT INSTRUCTIONS
Recommendations to patient:                            3. Practice new coping, stress management, relaxation skills at least                   two times a day for at least 10-30 minutes.              2. Find at least one positive outlet per day that makes you feel better.              3. Talk things over with a good friend. Practice letting things go.              4. Stop, breathe, reset. \"I am ok. \"              1. I could do something with my brother.               3. I am fine the way I am.                 Scheduled follow up appointment.     Call for a sooner appointment if needed or if you need to change or cancel you appointment.     [place] [phone]      Personal Thought  Control. Our thinking often creates anxiety for us. Getting better control of our thinking can go a long way in helping us cope. The following steps can be useful. 1. Let yourself become aware of thoughts you have when you are anxious. What are the words that you are saying to yourself at that moment? Sometimes it takes a little practice before we become aware of our thoughts. Some examples might be:  I know something bad is going to happen, or This is horrible or Darwyn Brinks is this happening to me!?  2. Write your thoughts down. Its much easier to work with our thoughts, analyze them, and replace them if they are in black and white.   3. Ask yourself the following questions about your thoughts:  a. Is it true? (Is it logically correct? Where is the evidence to support the truth of that thought? Are there alternative ways of thinking that would be more correct?). If a thought is not as true as it could be, replace it with a more realistic and helpful one. The majority of thoughts we have that generate anxiety are not the most realistic appraisals of the situation. b. So what? (If this is logically correct, what does it mean to me? Is there anything I can do about the situation?   Is it in my best interest to get anxious about this?). 4. Use coping self-statements. When feeling anxious, you may be able to tell yourself automatic phrases without thinking too much about it. A couple of examples would be phrases such as Its OK, I can handle it, or Ive been through things like this before and have done all right.   Notice that these statements tend to be true for all of us. Notice a change in your emotional state as you change your thinking. As your thoughts become more realistic, you will probably notice a decrease in anxiety and tension, and an increase in your ability to cope. How To Question Stressful, Angry, Anxious, or Depressed Thinking    1. Am I upsetting myself unnecessarily? How can I see this another way? 2. Is my thinking working for or against me? How could I view this in a less upsetting way? 3. What am I demanding must happen? What do I want or prefer, rather than need? 4. Am I making something too terrible? Is that awful? What would be so terrible about that? 5. Am I labeling a person? What is the action that I dont like? 6. What is untrue about my thoughts? How can I stick to the facts? 7. Am I using extreme, black-and-white language? What words might be more accurate? 8. Am I fortune-telling or mind-reading in a way that gets me upset or unhappy? What are the odds  or chances that it will really turn out the way Im thinking or imagining? 9. What are my options in this situation? How would I like to respond? 10. What are more moderate, helpful, or realistic statements to replace the upsetting ones? 11. Have I had any experiences that show that this thought might not be completely true? 12. If my best friend or someone I loved had this thought, what would I tell them? 15. If someone I cared about knew I was thinking this thought, what would they say to me? 14. Are there strengths in me or positives in the situation that I am ignoring? 15.  When I am not feeling this way, do I think about this situation any differently? How?  16. Have I been in this type of situation before? What happened? What have I learned from prior experiences that could help me now? 17. Five years from now, if I look back on this situation, will I look at it any differently? Will I focus on any different part of my experience? 25. Am I blaming myself for something over which I do not have complete control? 19. Thinking Mistakes That Create Stress, Anger, Depression, Anxiety, and Worry    All-or-nothing thinking. You see things in black-and-white categories. It is either one thing or another; there is no room for anything in between. Im 100% healthy or I must have a fatal disease.   Jumping to conclusions. You make a negative interpretation even though there are no definite facts that convincingly support your conclusion. My  is late because he is in a car accident and is injured on the side of the road.   Fortune-telling. You anticipate things will turn out badly, convinced the prediction is a fact. Not getting this job will cause us to lose the house.    Should statements. Musts and oughts are also offenders. Emotional consequences can include anxiety and anger. I should be able to handle this.    Overgeneralization. Assuming one event is actually a pattern. My hand is a little shaky today, I must have Parkinsons Disease.   Disqualifying the positive. Filtering out or rejecting positive experiences to maintain negative beliefs. Upon hearing that your spouse has checked all the doors and windows and they are all locked you think, But someone could cut out a piece of glass and open the window.   Catastrophizing. Predicting the worst possible outcome imaginable. Terrible, awful, horrible, worst ever might be key words. If I cant get my heart to stop pounding Im going to die.      Superstitious thinking. The thought that something you do prevents something awful from happening.  Tayler Puente my spouse a hug and telling her to be careful before going to work will prevent her from getting in a wreck. I do it every morning and she hasnt gotten in a wreck yet.   Emotional reasoning. The belief that because you feel a certain way means that the assumptions and associations you have with that feeling are true. The fear, doom, and constant anxiety must mean something is seriously wrong with me. 
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
## 2021-05-25 ENCOUNTER — OFFICE VISIT (OUTPATIENT)
Dept: PSYCHOLOGY | Age: 36
End: 2021-05-25
Payer: COMMERCIAL

## 2021-05-25 DIAGNOSIS — F33.0 MDD (MAJOR DEPRESSIVE DISORDER), RECURRENT EPISODE, MILD (HCC): ICD-10-CM

## 2021-05-25 DIAGNOSIS — F41.1 GAD (GENERALIZED ANXIETY DISORDER): Primary | ICD-10-CM

## 2021-05-25 DIAGNOSIS — F43.9 SITUATIONAL STRESS: ICD-10-CM

## 2021-05-25 PROCEDURE — 90834 PSYTX W PT 45 MINUTES: CPT | Performed by: SOCIAL WORKER

## 2021-05-25 NOTE — PATIENT INSTRUCTIONS
Recommendations to patient:                            8. Practice new coping, stress management, relaxation skills at least                   two times a day for at least 10-30 minutes.              2. Find at least one positive outlet per day that makes you feel better.              3. Talk things over with a good friend. Practice letting things go.              4. Stop, breathe, reset. \"I am ok. \"              2. I could do something with my brother.               4. I am fine the way I am.    7. Its ok to say No.                 Scheduled follow up appointment.     Call for a sooner appointment if needed or if you need to change or cancel you appointment.     [place] [phone]      When to say Yes  How to say No  To take control of your life      Marlen Marinelli Breath. When we are uncertain about what action can be taken to handle a situation, using the problem-solving steps below can help point us in the right direction. 1. Get information about the problem. We often know something is wrong but dont know exactly what. Identifying the problem in clear, specific terms is essential to resolving it. 2. Generate options for resolving the problem. There are usually a number of different ways of dealing with a problem. At this step, its important to come up with as many potential solutions as possible, even if they dont sound quite right at first.  Suspend any judgment of the options until you have generated as many as possible. 3. Evaluate the advantages and disadvantages of implementing each option. Not all options will be as good as others. Think about how successful you think each option will be at resolving the problem, and what might get in the way of that option actually working. 4. Choose an option and implement it. Which option do you think will work the best overall?   Make a plan for implementing the option, and put it into action. 5. Analyze whether the option worked to resolve the problem. Keep track of what changes occur as you implement the option chosen. If the option worked, the problem is resolved and you are done. If not, you need to re-think the situation, by repeating the process. 6. Repeat Steps 1 through 5 as needed.
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
## 2021-06-08 ENCOUNTER — OFFICE VISIT (OUTPATIENT)
Dept: PSYCHOLOGY | Age: 36
End: 2021-06-08

## 2021-06-08 DIAGNOSIS — F41.1 GAD (GENERALIZED ANXIETY DISORDER): Primary | ICD-10-CM

## 2021-06-08 DIAGNOSIS — F43.9 SITUATIONAL STRESS: ICD-10-CM

## 2021-06-08 DIAGNOSIS — F33.0 MDD (MAJOR DEPRESSIVE DISORDER), RECURRENT EPISODE, MILD (HCC): ICD-10-CM

## 2021-06-08 PROCEDURE — 90834 PSYTX W PT 45 MINUTES: CPT | Performed by: SOCIAL WORKER

## 2021-06-08 NOTE — PATIENT INSTRUCTIONS
Recommendations to patient:                            1. Practice new coping, stress management, relaxation skills at least                   two times a day for at least 10-30 minutes.              2. Find at least one positive outlet per day that makes you feel better.              3. Talk things over with a good friend. Practice letting things go.              4. Stop, breathe, reset. \"I am ok. \"              2. I could do something with my brother.               7. I am fine the way I am.               7. Its ok to say No.                 Scheduled follow up appointment.     Call for a sooner appointment if needed or if you need to change or cancel you appointment. When something really bad happens, everything changes. The way we feel, the way we think, the way we deal with things. We feel anxious, on edge, frustrated, upset, sad, defensive, cornered. We question everything. The concepts of how the world is supposed to be, are shaken. There will be ups and downs, and they can be intense. Those moments pass, we can learn to cope with them  Goal of healing is to put the inner world back in order so that we can deal with the outer world in a healthy, safe, secure way. Excessive emotional or psychological reliance on a partner, typically one who requires support on account of an illness or addiction. Codependency is a behavioral condition in a relationship where one person enables another person's addiction, poor mental health, immaturity, irresponsibility, or under-achievement. Among the core characteristics of codependency is an excessive reliance on other people for approval and a sense of identity.              Sponsored       Courage to Cure Codependency: Healthy Detachment Strategies to Overcome Jealousy in Relationships, Stop Controlling Others, Boost Your Self Esteem, and Be Codependent No More   by Rosenda Alexandre  Nov 5, 2018   Paperback   $14.91
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
## 2021-07-06 ENCOUNTER — OFFICE VISIT (OUTPATIENT)
Dept: PSYCHOLOGY | Age: 36
End: 2021-07-06
Payer: COMMERCIAL

## 2021-07-06 DIAGNOSIS — F43.9 SITUATIONAL STRESS: ICD-10-CM

## 2021-07-06 DIAGNOSIS — F41.1 GAD (GENERALIZED ANXIETY DISORDER): Primary | ICD-10-CM

## 2021-07-06 DIAGNOSIS — F33.0 MDD (MAJOR DEPRESSIVE DISORDER), RECURRENT EPISODE, MILD (HCC): ICD-10-CM

## 2021-07-06 PROCEDURE — 90834 PSYTX W PT 45 MINUTES: CPT | Performed by: SOCIAL WORKER

## 2021-07-06 NOTE — PATIENT INSTRUCTIONS
Recommendations to patient:                            7. Practice new coping, stress management, relaxation skills at least                   two times a day for at least 10-30 minutes.              2. Find at least one positive outlet per day that makes you feel better.              3. Talk things over with a good friend. Practice letting things go.              4. Stop, breathe, reset. \"I am ok. \"              0. I could do something with my brother.               7. I am fine the way I am.               7. Its ok to say No.                 Scheduled follow up appointment.     Call for a sooner appointment if needed or if you need to change or cancel you appointment.
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
## 2021-07-20 ENCOUNTER — OFFICE VISIT (OUTPATIENT)
Dept: PSYCHOLOGY | Age: 36
End: 2021-07-20
Payer: COMMERCIAL

## 2021-07-20 DIAGNOSIS — F43.9 SITUATIONAL STRESS: ICD-10-CM

## 2021-07-20 DIAGNOSIS — F33.0 MDD (MAJOR DEPRESSIVE DISORDER), RECURRENT EPISODE, MILD (HCC): ICD-10-CM

## 2021-07-20 DIAGNOSIS — F41.1 GAD (GENERALIZED ANXIETY DISORDER): Primary | ICD-10-CM

## 2021-07-20 PROCEDURE — 90834 PSYTX W PT 45 MINUTES: CPT | Performed by: SOCIAL WORKER
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
## 2021-07-20 NOTE — PATIENT INSTRUCTIONS
Recommendations to patient:                            1. Practice new coping, stress management, relaxation skills at least                   two times a day for at least 10-30 minutes.              2. Find at least one positive outlet per day that makes you feel better.              3. Talk things over with a good friend. Practice letting things go.              4. Stop, breathe, reset. \"I am ok. \"              8. I could do something with my brother.               8. I am fine the way I am.               7. Its ok to say No.                 Scheduled follow up appointment.

## 2021-08-04 LAB
CHOLESTEROL, TOTAL: 160 MG/DL (ref 160–199)
GLUCOSE BLD-MCNC: 91 MG/DL (ref 74–109)
HDLC SERPL-MCNC: 54 MG/DL (ref 55–121)
LDL CHOLESTEROL CALCULATED: 87 MG/DL
TRIGL SERPL-MCNC: 94 MG/DL (ref 0–149)

## 2021-08-31 ENCOUNTER — VIRTUAL VISIT (OUTPATIENT)
Dept: PSYCHOLOGY | Age: 36
End: 2021-08-31
Payer: COMMERCIAL

## 2021-08-31 DIAGNOSIS — F43.9 SITUATIONAL STRESS: ICD-10-CM

## 2021-08-31 DIAGNOSIS — F41.1 GAD (GENERALIZED ANXIETY DISORDER): Primary | ICD-10-CM

## 2021-08-31 DIAGNOSIS — F33.0 MDD (MAJOR DEPRESSIVE DISORDER), RECURRENT EPISODE, MILD (HCC): ICD-10-CM

## 2021-08-31 PROCEDURE — 99443 PR PHYS/QHP TELEPHONE EVALUATION 21-30 MIN: CPT | Performed by: SOCIAL WORKER
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
## 2021-09-08 NOTE — PATIENT INSTRUCTIONS
Recommendations to patient:                            2. Practice new coping, stress management, relaxation skills at least                   two times a day for at least 10-30 minutes.              2. Find at least one positive outlet per day that makes you feel better.              3. Talk things over with a good friend. Practice letting things go.              4. Stop, breathe, reset. \"I am ok. \"              5. I could do something with my brother.               7. I am fine the way I am.               7. Its ok to say No.                 Scheduled follow up appointment.

## 2021-09-20 ENCOUNTER — OFFICE VISIT (OUTPATIENT)
Dept: PSYCHOLOGY | Age: 36
End: 2021-09-20
Payer: COMMERCIAL

## 2021-09-20 DIAGNOSIS — F33.0 MDD (MAJOR DEPRESSIVE DISORDER), RECURRENT EPISODE, MILD (HCC): ICD-10-CM

## 2021-09-20 DIAGNOSIS — F43.9 SITUATIONAL STRESS: ICD-10-CM

## 2021-09-20 DIAGNOSIS — F41.1 GAD (GENERALIZED ANXIETY DISORDER): Primary | ICD-10-CM

## 2021-09-20 PROCEDURE — 90832 PSYTX W PT 30 MINUTES: CPT | Performed by: SOCIAL WORKER
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
## 2021-09-20 NOTE — PATIENT INSTRUCTIONS
Recommendations to patient:                            3. Practice new coping, stress management, relaxation skills at least                   two times a day for at least 10-30 minutes.              2. Find at least one positive outlet per day that makes you feel better.              3. Talk things over with a good friend. Practice letting things go.              4. Stop, breathe, reset. \"I am ok. \"              6. I could do something with my brother.               3. I am fine the way I am.               7. Its ok to say No.                 Scheduled follow up appointment.

## 2021-09-21 ENCOUNTER — OFFICE VISIT (OUTPATIENT)
Dept: FAMILY MEDICINE CLINIC | Age: 36
End: 2021-09-21
Payer: COMMERCIAL

## 2021-09-21 VITALS
DIASTOLIC BLOOD PRESSURE: 86 MMHG | SYSTOLIC BLOOD PRESSURE: 132 MMHG | OXYGEN SATURATION: 97 % | WEIGHT: 181 LBS | BODY MASS INDEX: 26.73 KG/M2 | TEMPERATURE: 97.8 F | HEART RATE: 84 BPM

## 2021-09-21 DIAGNOSIS — E66.3 OVERWEIGHT: ICD-10-CM

## 2021-09-21 DIAGNOSIS — Z23 FLU VACCINE NEED: ICD-10-CM

## 2021-09-21 DIAGNOSIS — Z00.00 ANNUAL PHYSICAL EXAM: Primary | ICD-10-CM

## 2021-09-21 PROBLEM — F41.8 DEPRESSION WITH ANXIETY: Status: RESOLVED | Noted: 2017-08-28 | Resolved: 2021-09-21

## 2021-09-21 PROBLEM — E29.1 HYPOGONADISM MALE: Status: RESOLVED | Noted: 2020-09-21 | Resolved: 2021-09-21

## 2021-09-21 PROCEDURE — 90471 IMMUNIZATION ADMIN: CPT | Performed by: INTERNAL MEDICINE

## 2021-09-21 PROCEDURE — 99395 PREV VISIT EST AGE 18-39: CPT | Performed by: INTERNAL MEDICINE

## 2021-09-21 PROCEDURE — 90674 CCIIV4 VAC NO PRSV 0.5 ML IM: CPT | Performed by: INTERNAL MEDICINE

## 2021-09-21 ASSESSMENT — ENCOUNTER SYMPTOMS
SHORTNESS OF BREATH: 0
COLOR CHANGE: 0
VOICE CHANGE: 0
SORE THROAT: 0
WHEEZING: 0
EYE PAIN: 0
ABDOMINAL PAIN: 0
VOMITING: 0
CHEST TIGHTNESS: 0
BLOOD IN STOOL: 0
SINUS PRESSURE: 0
RHINORRHEA: 0
COUGH: 0
DIARRHEA: 0
EYE REDNESS: 0
EYE DISCHARGE: 0

## 2021-09-21 ASSESSMENT — VISUAL ACUITY: OU: 1

## 2021-09-21 NOTE — PROGRESS NOTES
After obtaining consent, and per orders of Dr. Siva Jones, injection of flucelvax given in Right deltoid by Migue Muse MA. Patient instructed to remain in clinic for 20 minutes afterwards, and to report any adverse reaction to me immediately.

## 2021-09-21 NOTE — PATIENT INSTRUCTIONS
Patient Education        Well Visit, Ages 25 to 48: Care Instructions  Overview     Well visits can help you stay healthy. Your doctor has checked your overall health and may have suggested ways to take good care of yourself. Your doctor also may have recommended tests. At home, you can help prevent illness with healthy eating, regular exercise, and other steps. Follow-up care is a key part of your treatment and safety. Be sure to make and go to all appointments, and call your doctor if you are having problems. It's also a good idea to know your test results and keep a list of the medicines you take. How can you care for yourself at home? · Get screening tests that you and your doctor decide on. Screening helps find diseases before any symptoms appear. · Eat healthy foods. Choose fruits, vegetables, whole grains, protein, and low-fat dairy foods. Limit fat, especially saturated fat. Reduce salt in your diet. · Limit alcohol. If you are a man, have no more than 2 drinks a day or 14 drinks a week. If you are a woman, have no more than 1 drink a day or 7 drinks a week. · Get at least 30 minutes of physical activity on most days of the week. Walking is a good choice. You also may want to do other activities, such as running, swimming, cycling, or playing tennis or team sports. Discuss any changes in your exercise program with your doctor. · Reach and stay at a healthy weight. This will lower your risk for many problems, such as obesity, diabetes, heart disease, and high blood pressure. · Do not smoke or allow others to smoke around you. If you need help quitting, talk to your doctor about stop-smoking programs and medicines. These can increase your chances of quitting for good. · Care for your mental health. It is easy to get weighed down by worry and stress. Learn strategies to manage stress, like deep breathing and mindfulness, and stay connected with your family and community.  If you find you often feel sad or hopeless, talk with your doctor. Treatment can help. · Talk to your doctor about whether you have any risk factors for sexually transmitted infections (STIs). You can help prevent STIs if you wait to have sex with a new partner (or partners) until you've each been tested for STIs. It also helps if you use condoms (male or female condoms) and if you limit your sex partners to one person who only has sex with you. Vaccines are available for some STIs, such as HPV. · Use birth control if it's important to you to prevent pregnancy. Talk with your doctor about the choices available and what might be best for you. · If you think you may have a problem with alcohol or drug use, talk to your doctor. This includes prescription medicines (such as amphetamines and opioids) and illegal drugs (such as cocaine and methamphetamine). Your doctor can help you figure out what type of treatment is best for you. · Protect your skin from too much sun. When you're outdoors from 10 a.m. to 4 p.m., stay in the shade or cover up with clothing and a hat with a wide brim. Wear sunglasses that block UV rays. Even when it's cloudy, put broad-spectrum sunscreen (SPF 30 or higher) on any exposed skin. · See a dentist one or two times a year for checkups and to have your teeth cleaned. · Wear a seat belt in the car. When should you call for help? Watch closely for changes in your health, and be sure to contact your doctor if you have any problems or symptoms that concern you. Where can you learn more? Go to https://yousif.healtheBillme. org and sign in to your [employer] account. Enter P072 in the [employer] box to learn more about \"Well Visit, Ages 25 to 48: Care Instructions. \"     If you do not have an account, please click on the \"Sign Up Now\" link. Current as of: May 27, 2020               Content Version: 12.9  © 9305-3957 Healthwise, Incorporated. Care instructions adapted under license by [facility] ([facility]). If you have questions about a medical condition or this instruction, always ask your healthcare professional. [address] any warranty or liability for your use of this information. Patient Education        When You Are Overweight: Care Instructions  Your Care Instructions     If you're overweight, your doctor may recommend that you make changes in your eating and exercise habits. Being overweight can lead to serious health problems, such as high blood pressure, heart disease, type 2 diabetes, and arthritis, or it can make these problems worse. Eating a healthy diet and being more active can help you reach and stay at a healthy weight. You don't have to make huge changes all at once. Start by making small changes in your eating and exercise habits. To lose weight, you need to burn more calories than you take in. You can do this by eating healthy foods in reasonable amounts and becoming more active every day. Follow-up care is a key part of your treatment and safety. Be sure to make and go to all appointments, and call your doctor if you are having problems. It's also a good idea to know your test results and keep a list of the medicines you take. How can you care for yourself at home? · Improve your eating habits. You'll be more successful if you work on changing one eating habit at a time. All foods, if eaten in moderation, can be part of healthy eating. Remember to:  ? Eat a variety of foods from each food group. Include grains, vegetables, fruits, dairy, and protein foods. ? Limit foods high in fat, sugar, and calories. ? Eat slowly. And don't do anything else, such as watch TV, while you are eating. ? Pay attention to portion sizes. Put your food on a smaller plate. ? Plan your meals ahead of time. You'll be less likely to grab something that's not as healthy. · Get active. Regular activity can help you feel better, have more energy, and burn more calories.  If you haven't been active, start slowly. Start with at least 30 minutes of moderate activity on most days of the week. Then gradually increase the amount of activity. Try for 60 or 90 minutes a day, at least 5 days a week. There are a lot of ways to fit activity into your life. You can:  ? Walk or bike to the store. Or walk with a friend, or walk the dog.  ? Mow the lawn, rake leaves, shovel snow, or do some gardening. ? Use the stairs instead of the elevator, at least for a few floors. · Change your thinking. Your thoughts have a lot to do with how you feel and what you do. When you're trying to reach a healthy weight, changing how you think about certain things may help. Here are some ideas:  ? Don't compare yourself to others. Healthy bodies come in all shapes and sizes. ? Pay attention to how hungry or full you feel. When you eat, be aware of why you're eating and how much you're eating. ? Focus on improving your health instead of dieting. Dieting almost never works over the long term. · Ask your doctor about other health professionals who can help you reach a healthy weight. ? A dietitian can help you make healthy changes in your diet. ? An exercise specialist or  can help you develop a safe and effective exercise program.  ? A counselor or psychiatrist can help you cope with issues such as depression, anxiety, or family problems that can make it hard to focus on reaching a healthy weight. · Get support from your family, your doctor, your friends, a support group--and support yourself. Where can you learn more? Go to https://ClariPhy CommunicationsalyshaEnergy Points.addwish. org and sign in to your [employer] account. Enter D000 in the [employer] box to learn more about \"When You Are Overweight: Care Instructions. \"     If you do not have an account, please click on the \"Sign Up Now\" link. Current as of: March 17, 2021               Content Version: 12.9  © 8397-0368 Healthwise, [facility].    Care instructions adapted under license by [facility] ([facility]). If you have questions about a medical condition or this instruction, always ask your healthcare professional. [address] any warranty or liability for your use of this information. Patient Education        influenza virus vaccine (injection)  Pronunciation:  IN [place] EN ellyn HUGHESJONAS nicolás FLORES seen  Brand:  Afluria PF Pediatric Quadrivalent [id number], Afluria PF Quadrivalent 9334-4164, Afluria Quadrivalent 2897-5936, Fluad PF 1383-0912, Fluad Quadrivalent PF 9057-5500, Fluarix PF Quadrivalent 2572-5105, Flublok Quadrivalent 2845-5984, Flucelvax PF Quadrivalent 8332-1263, Flucelvax Quadrivalent 0833-2824, FluLaval PF Quadrivalent 6220-1154, Fluzone High-Dose Quadrivalent PF 7793-3010, Fluzone PF Quadrivalent 3061-7571, Fluzone Quadrivalent 6551-4936  What is the most important information I should know about this vaccine? The injectable influenza virus vaccine (flu shot) is a \"killed virus\" vaccine. Influenza virus vaccine is also available in a nasal spray form, which is a \"live virus\" vaccine. This medication guide addresses only the injectable form of this vaccine. Becoming infected with influenza is much more dangerous to your health than receiving this vaccine. However, like any medicine, this vaccine can cause side effects but the risk of serious side effects is extremely low. What is influenza virus vaccine? Influenza virus (commonly known as \"the flu\") is a serious disease caused by a virus. Influenza virus can spread from one person to another through small droplets of saliva that are expelled into the air when an infected person coughs or sneezes. The virus can also be passed through contact with objects the infected person has touched, such as a door handle or other surfaces. Influenza virus vaccine is used to prevent infection caused by influenza virus.  The vaccine is redeveloped each year to contain specific strains of inactivated (killed) flu virus that are recommended by public health officials for that year. The injectable influenza virus vaccine (flu shot) is a \"killed virus\" vaccine. Influenza virus vaccine is also available in a nasal spray form, which is a \"live virus\" vaccine. Influenza virus vaccine works by exposing you to a small dose of the virus, which helps your body to develop immunity to the disease. Influenza virus vaccine will not treat an active infection that has already developed in the body. Influenza virus vaccine is for use in adults and children who are at least 7 months old. Becoming infected with influenza is much more dangerous to your health than receiving this vaccine. Influenza causes thousands of deaths each year, and hundreds of thousands of hospitalizations. However, like any medicine, this vaccine can cause side effects but the risk of serious side effects is extremely low. Like any vaccine, influenza virus vaccine may not provide protection from disease in every person. This vaccine will not prevent illness caused by samir flu (\"bird flu\"). What should I discuss with my healthcare provider before receiving this vaccine? You may not be able to receive this vaccine if you are allergic to eggs, or if you have:  · a history of severe allergic reaction to a flu vaccine; or  · a history of Guillain-Conway syndrome (within 6 weeks after receiving a flu vaccine). Tell your doctor if you have ever had:  · bleeding problems;  · a neurologic disorder or disease affecting the brain (or if this was a reaction to a previous vaccine);  · seizures;  · a weak immune system caused by disease, bone marrow transplant, or by using certain medicines or receiving cancer treatments; or  · an allergy to latex. You can still receive a vaccine if you have a minor cold. If you have a severe illness with a fever or any type of infection, wait until you get better before receiving this vaccine.   The Centers for Disease Control and Prevention recommends that pregnant women get a flu shot during any trimester of pregnancy to protect themselves and their  babies from flu. The nasal spray form of influenza vaccine is not recommended for use in pregnant women. It may not be safe to breastfeed while using this medicine. Ask your doctor about any risk. This vaccine should not be given to a child younger than 7 months old. How is this vaccine given? Some brands of this vaccine are made for use in adults and not in children. Your child's doctor can recommend the best influenza virus vaccine for your child. This vaccine is given as an injection (shot) into a muscle. You will receive this injection in a doctor's office or other clinic setting. You should receive a flu vaccine every year. Your immunity will gradually decrease over the 12 months after you receive the influenza virus vaccine. Children receiving this vaccine may need a booster shot one month after receiving the first vaccine. The influenza virus vaccine is usually given in October or November. Some people may need to have their vaccines earlier or later. Follow your doctor's instructions. Your doctor may recommend treating fever and pain with an aspirin-free pain reliever such as acetaminophen (Tylenol) or ibuprofen (Motrin, Advil, and others) when the shot is given and for the next 24 hours. Follow the label directions or your doctor's instructions about how much of this medicine to give your child. It is especially important to prevent fever from occurring in a child who has a seizure disorder such as epilepsy. What happens if I miss a dose? Since flu shots are usually given only one time per year, you will most likely not be on a dosing schedule. Call your doctor if you forget to receive your yearly flu shot in October or November. If your child misses a booster dose of this vaccine, call your doctor for instructions.   What happens if I finished:  · phenytoin, theophylline, or warfarin (Coumadin, Jantoven);  · an oral, nasal, inhaled, or injectable steroid medicine;  · medications to treat psoriasis, rheumatoid arthritis, or other autoimmune disorders--azathioprine, etanercept, leflunomide, and others; or  · medicines to treat or prevent organ transplant rejection--basiliximab, cyclosporine, muromonab-CD3, mycophenolate mofetil, sirolimus, tacrolimus. This list is not complete. Other drugs may affect influenza virus vaccine, including prescription and over-the-counter medicines, vitamins, and herbal products. Not all possible drug interactions are listed here. Where can I get more information? Your doctor or pharmacist can provide more information about this vaccine. Additional information is available from your local health department or the Centers for Disease Control and Prevention. Remember, keep this and all other medicines out of the reach of children, never share your medicines with others, and use this medication only for the indication prescribed. Every effort has been made to ensure that the information provided by [address] is accurate, up-to-date, and complete, but no guarantee is made to that effect. Drug information contained herein may be time sensitive. [facility] information has been compiled for use by healthcare practitioners and consumers in the [place] and therefore [facility] does not warrant that uses outside of the [place] are appropriate, unless specifically indicated otherwise. [facility]'s drug information does not endorse drugs, diagnose patients or recommend therapy. [facility] drug information is an informational resource designed to assist licensed healthcare practitioners in caring for their patients and/or to serve consumers viewing this service as a supplement to, and not a substitute for, the expertise, skill, knowledge and judgment of healthcare practitioners.  The absence of a warning for a given drug or drug combination in no way should be construed to indicate that the drug or drug combination is safe, effective or appropriate for any given patient. [facility] does not assume any responsibility for any aspect of healthcare administered with the aid of information [facility] provides. The information contained herein is not intended to cover all possible uses, directions, precautions, warnings, drug interactions, allergic reactions, or adverse effects. If you have questions about the drugs you are taking, check with your doctor, nurse or pharmacist.  Copyright 1274-8174 [address]. Version: 8.03. Revision date: 8/19/2020. Care instructions adapted under license by [facility] ([facility]). If you have questions about a medical condition or this instruction, always ask your healthcare professional. [place] [address] any warranty or liability for your use of this information. Patient Education        Testicular Self-Exam: Care Instructions  Your Care Instructions     A self-exam is a way for you to check for cancer of the testicles. Although testicular cancer is rare, it is one of the most common tumors in men younger than 28. Many testicular cancers are found during self-exam. In the early stages of testicular cancer, the lump, which may be about the size of a pea, usually is not painful. Testicular cancer, especially if treated early, is very often cured. By doing this self-exam regularly, you can learn the normal size, shape, and weight of your testicles. This allows you to note any changes. Follow-up care is a key part of your treatment and safety. Be sure to make and go to all appointments, and call your doctor if you are having problems. It's also a good idea to know your test results and keep a list of the medicines you take. How is it done?   · The self-exam is best done during or after a bath or shower--when the scrotum, the skin sac that holds the testicles, is relaxed. · Stand and place your right leg on a raised surface about chair height. Then gently feel your scrotum until you find the right testicle. · Roll the testicle gently but firmly between your thumb and fingers of both hands. Carefully feel the surface for lumps. Feel for any change in the size, shape, or texture of the testicle. The testicle should feel round and smooth. It is normal for one testicle to be slightly larger than the other one. · Repeat this for the left side. Feel the entire surface of both testicles. · You may feel the epididymis, the soft tube behind each testicle. Get familiar with how it feels so that you won't mistake it for a lump. When should you call for help? Call your doctor now or seek immediate medical care if:    · You have pain in a testicle. Watch closely for changes in your health, and be sure to contact your doctor if:    · You notice a change in a testicle.     · You notice a lump in a testicle. Where can you learn more? Go to https://InMyRoom.Daixe. org and sign in to your [employer] account. Enter H682 in the [employer] box to learn more about \"Testicular Self-Exam: Care Instructions. \"     If you do not have an account, please click on the \"Sign Up Now\" link. Current as of: February 10, 2021               Content Version: 12.9  © 0891-5919 Healthwise, Incorporated. Care instructions adapted under license by [facility] ([facility]). If you have questions about a medical condition or this instruction, always ask your healthcare professional. [place] [address] any warranty or liability for your use of this information.

## 2021-09-21 NOTE — PROGRESS NOTES
Edie Casillas is a 28 y.o. male who presents today for   Chief Complaint   Patient presents with    Annual Exam       HPI  27 y/o WM here for annual wellness visit. His Be Well screening labs were done on 8/4/21 with fasting blood glucose 91, total cholesterol 160, triglyceride level 94, HDL 54, and LDL 87. His divorce is almost final now which has helped with stress level. His depression and anxiety have improved with counseling with behavioral health Sarah Husain. He has been able to lose weight as mood has improved and he is involved in martial arts ([employer]) 3-5 days a week. He would like his flu vaccine today and his covid-19 vaccine is up to date. BMI Readings from Last 3 Encounters:   09/21/21 26.73 kg/m²   04/12/21 27.66 kg/m²   12/07/20 27.35 kg/m²     Wt Readings from Last 3 Encounters:   09/21/21 181 lb (82.1 kg)   04/12/21 187 lb 4.8 oz (85 kg)   12/07/20 185 lb 3.2 oz (84 kg)       Review of Systems   Constitutional: Negative for appetite change, chills, fatigue and fever. HENT: Negative for ear pain, rhinorrhea, sinus pressure, sore throat and voice change. Eyes: Negative for pain, discharge and redness. Respiratory: Negative for cough, chest tightness, shortness of breath and wheezing. Cardiovascular: Negative for chest pain and palpitations. Gastrointestinal: Negative for abdominal pain, blood in stool, diarrhea and vomiting. Endocrine: Negative for cold intolerance, heat intolerance and polydipsia. Genitourinary: Negative for dysuria and hematuria. Musculoskeletal: Negative for arthralgias, myalgias, neck pain and neck stiffness. Skin: Negative for color change and rash. Neurological: Negative for dizziness, tremors, syncope, speech difficulty, weakness, numbness and headaches. Hematological: Negative for adenopathy. Does not bruise/bleed easily. Psychiatric/Behavioral: Negative for confusion, dysphoric mood and sleep disturbance. The patient is nervous/anxious. Anxiety and mild depression improved   All other systems reviewed and are negative. Past Medical History:   Diagnosis Date    Anxiety     Bilateral low back pain without sciatica 9/14/2018    Chronic back pain     Chronic lung disease of prematurity     Depression with anxiety     Gynecomastia 2003    right side    Insomnia     Perennial allergic rhinitis 8/28/2017    Premature infant of 30 weeks gestation     Birth Weight 3lbs 11 oz       Current Outpatient Medications   Medication Sig Dispense Refill    Melatonin 5 MG CAPS 5-10 mg at bedtime as needed for insomnia  5    albuterol sulfate HFA (PROAIR HFA) 108 (90 Base) MCG/ACT inhaler Inhale 2 puffs into the lungs every 4 hours as needed for Wheezing or Shortness of Breath 2 Inhaler 3    Multiple Vitamins-Minerals (THERAPEUTIC MULTIVITAMIN-MINERALS) tablet Take 1 tablet by mouth daily      Fexofenadine HCl (ALLEGRA PO) Take by mouth       No current facility-administered medications for this visit. Allergies   Allergen Reactions    Amoxicillin     Other Rash     STERI STRIPS    Pcn [Penicillins] Rash       Past Surgical History:   Procedure Laterality Date    CYST REMOVAL      MASTECTOMY Right 2003    due to gynecomastia       Social History     Tobacco Use    Smoking status: Never Smoker    Smokeless tobacco: Never Used   Vaping Use    Vaping Use: Never used   Substance Use Topics    Alcohol use: Yes     Alcohol/week: 0.0 standard drinks     Comment: rarely    Drug use: No       Family History   Problem Relation Age of Onset    Heart Disease Paternal Grandfather     Prostate Cancer Father     Diabetes Daughter         Type I       /86   Pulse 84   Temp 97.8 °F (36.6 °C)   Wt 181 lb (82.1 kg)   SpO2 97%   BMI 26.73 kg/m²     Physical Exam  Vitals and nursing note reviewed. Constitutional:       General: He is not in acute distress. Appearance: Normal appearance.  He is well-developed, well-groomed and normal weight. He is not ill-appearing, toxic-appearing or diaphoretic. HENT:      Head: Normocephalic and atraumatic. Right Ear: Tympanic membrane, ear canal and external ear normal.      Left Ear: Tympanic membrane, ear canal and external ear normal.      Nose: Nose normal.      Mouth/Throat:      Lips: Pink. Mouth: Mucous membranes are moist. No oral lesions. Tongue: No lesions. Palate: No mass and lesions. Pharynx: Oropharynx is clear. Uvula midline. No pharyngeal swelling, oropharyngeal exudate, posterior oropharyngeal erythema or uvula swelling. Tonsils: 1+ on the right. 1+ on the left. Eyes:      General: Lids are normal. Vision grossly intact. No scleral icterus. Extraocular Movements: Extraocular movements intact. Conjunctiva/sclera: Conjunctivae normal.      Pupils: Pupils are equal, round, and reactive to light. Neck:      Thyroid: No thyroid mass or thyromegaly. Vascular: No carotid bruit or JVD. Trachea: Trachea and phonation normal.   Cardiovascular:      Rate and Rhythm: Normal rate and regular rhythm. Pulses: Normal pulses. Radial pulses are 2+ on the right side and 2+ on the left side. Posterior tibial pulses are 2+ on the right side and 2+ on the left side. Heart sounds: Normal heart sounds. No murmur heard. No friction rub. No gallop. Pulmonary:      Effort: Pulmonary effort is normal. No accessory muscle usage. Breath sounds: Normal breath sounds. No decreased breath sounds, wheezing, rhonchi or rales. Abdominal:      General: Abdomen is flat. Bowel sounds are normal. There is no distension. Palpations: Abdomen is soft. There is no hepatomegaly, splenomegaly or mass. Tenderness: There is no abdominal tenderness. There is no guarding or rebound. Hernia: No hernia is present. Musculoskeletal:         General: Normal range of motion.       Right wrist: Normal.      Left wrist: Normal. Cervical back: Normal range of motion and neck supple. Right lower leg: No edema. Left lower leg: No edema. Right ankle: Normal.      Left ankle: Normal.   Lymphadenopathy:      Head:      Right side of head: No submandibular adenopathy. Left side of head: No submandibular adenopathy. Cervical: No cervical adenopathy. Right cervical: No superficial, deep or posterior cervical adenopathy. Left cervical: No superficial, deep or posterior cervical adenopathy. Upper Body:      Right upper body: No supraclavicular adenopathy. Left upper body: No supraclavicular adenopathy. Lower Body: No right inguinal adenopathy. No left inguinal adenopathy. Skin:     General: Skin is warm and dry. Capillary Refill: Capillary refill takes less than 2 seconds. Coloration: Skin is not cyanotic. Findings: No rash. Nails: There is no clubbing. Neurological:      Mental Status: He is alert and oriented to person, place, and time. Cranial Nerves: No cranial nerve deficit, dysarthria or facial asymmetry. Sensory: Sensation is intact. Motor: Motor function is intact. No weakness, tremor, atrophy or abnormal muscle tone. Coordination: Coordination is intact. Romberg sign negative. Coordination normal.      Gait: Gait is intact. Deep Tendon Reflexes: Reflexes are normal and symmetric. Reflex Scores:       Brachioradialis reflexes are 2+ on the right side and 2+ on the left side. Patellar reflexes are 2+ on the right side and 2+ on the left side. Comments: CN II-XII grossly intact, speech clear, MAEW, no focal deficits   Psychiatric:         Attention and Perception: Attention and perception normal.         Mood and Affect: Mood and affect normal.         Speech: Speech normal.         Behavior: Behavior normal. Behavior is cooperative. Thought Content:  Thought content normal.         Cognition and Memory: Cognition and memory normal.         Judgment: Judgment normal.         Lab Results   Component Value Date    WBC 4.92 04/12/2021    HGB 17.4 04/12/2021    HCT 50.0 04/12/2021    MCV 89.1 04/12/2021     04/12/2021    LYMPHOPCT 30.9 04/12/2021    RBC 5.61 04/12/2021    MCH 31.0 04/12/2021    MCHC 34.8 04/12/2021    RDW 12.2 04/12/2021     Lab Results   Component Value Date     07/08/2020    K 3.9 07/08/2020     07/08/2020    CO2 23 07/08/2020    BUN 13 07/08/2020    CREATININE 0.9 07/08/2020    GLUCOSE 91 08/04/2021    CALCIUM 9.0 07/08/2020     Lab Results   Component Value Date    TSH 1.130 07/08/2020     Lab Results   Component Value Date    CHOL 160 08/04/2021    CHOL 167 03/05/2020    CHOL 167 05/08/2019     Lab Results   Component Value Date    TRIG 94 08/04/2021    TRIG 83 03/05/2020    TRIG 69 05/08/2019     Lab Results   Component Value Date    HDL 54 (L) 08/04/2021    HDL 47 (L) 03/05/2020    HDL 39 (L) 05/08/2019     Lab Results   Component Value Date    LDLCALC 87 08/04/2021    LDLCALC 103 03/05/2020    LDLCALC 114 05/08/2019     No results found for: LABVLDL, VLDL  No results found for: CHOLHDLRATIO    No results found for this visit on 09/21/21. Assessment:    ICD-10-CM    1. Annual physical exam  Z00.00    2. Flu vaccine need  Z23 INFLUENZA, MDCK QUADV, 2 YRS AND OLDER, IM, PF, PREFILL SYR OR SDV, 0.5ML (FLUCELVAX QUADV, PF)   3. Overweight  E66.3        Plan:  Carter Aleman was seen today for annual exam.    Diagnoses and all orders for this visit:    Annual physical exam    Flu vaccine need  -     INFLUENZA, MDCK QUADV, 2 YRS AND OLDER, IM, PF, PREFILL SYR OR SDV, 0.5ML (FLUCELVAX QUADV, PF)    Overweight        Labs reviewed with patient. No refills needed today. Major Depression well controlled. Encouraged Exercise and relaxation techniques for stress relief. Generalized Anxiety Disorder well controlled. Encouraged Exercise and relaxation techniques for stress relief.    Overweight on review of BMI but exam more consistent with muscular build. Continue exercise and health/balanced diet. Health Maintenance reviewed - Influenza vaccine updated today. Monthly CRISTINO encouraged. Return in about 1 year (around 9/21/2022) for [facility]. Orders Placed This Encounter   Procedures    INFLUENZA, MDCK QUADV, 2 YRS AND OLDER, IM, PF, PREFILL SYR OR SDV, 0.5ML (FLUCELVAX QUADV, PF)     No orders of the defined types were placed in this encounter. There are no discontinued medications. Patient Instructions       Patient Education        Well Visit, Ages 25 to 48: Care Instructions  Overview     Well visits can help you stay healthy. Your doctor has checked your overall health and may have suggested ways to take good care of yourself. Your doctor also may have recommended tests. At home, you can help prevent illness with healthy eating, regular exercise, and other steps. Follow-up care is a key part of your treatment and safety. Be sure to make and go to all appointments, and call your doctor if you are having problems. It's also a good idea to know your test results and keep a list of the medicines you take. How can you care for yourself at home? · Get screening tests that you and your doctor decide on. Screening helps find diseases before any symptoms appear. · Eat healthy foods. Choose fruits, vegetables, whole grains, protein, and low-fat dairy foods. Limit fat, especially saturated fat. Reduce salt in your diet. · Limit alcohol. If you are a man, have no more than 2 drinks a day or 14 drinks a week. If you are a woman, have no more than 1 drink a day or 7 drinks a week. · Get at least 30 minutes of physical activity on most days of the week. Walking is a good choice. You also may want to do other activities, such as running, swimming, cycling, or playing tennis or team sports. Discuss any changes in your exercise program with your doctor. · Reach and stay at a healthy weight.  This will lower your risk for many problems, such as obesity, diabetes, heart disease, and high blood pressure. · Do not smoke or allow others to smoke around you. If you need help quitting, talk to your doctor about stop-smoking programs and medicines. These can increase your chances of quitting for good. · Care for your mental health. It is easy to get weighed down by worry and stress. Learn strategies to manage stress, like deep breathing and mindfulness, and stay connected with your family and community. If you find you often feel sad or hopeless, talk with your doctor. Treatment can help. · Talk to your doctor about whether you have any risk factors for sexually transmitted infections (STIs). You can help prevent STIs if you wait to have sex with a new partner (or partners) until you've each been tested for STIs. It also helps if you use condoms (male or female condoms) and if you limit your sex partners to one person who only has sex with you. Vaccines are available for some STIs, such as HPV. · Use birth control if it's important to you to prevent pregnancy. Talk with your doctor about the choices available and what might be best for you. · If you think you may have a problem with alcohol or drug use, talk to your doctor. This includes prescription medicines (such as amphetamines and opioids) and illegal drugs (such as cocaine and methamphetamine). Your doctor can help you figure out what type of treatment is best for you. · Protect your skin from too much sun. When you're outdoors from 10 a.m. to 4 p.m., stay in the shade or cover up with clothing and a hat with a wide brim. Wear sunglasses that block UV rays. Even when it's cloudy, put broad-spectrum sunscreen (SPF 30 or higher) on any exposed skin. · See a dentist one or two times a year for checkups and to have your teeth cleaned. · Wear a seat belt in the car. When should you call for help?   Watch closely for changes in your health, and be sure to contact your doctor if you have any problems or symptoms that concern you. Where can you learn more? Go to https://chpepiceweb.healthLivekick. org and sign in to your [employer] account. Enter P072 in the [facility] box to learn more about \"Well Visit, Ages 25 to 48: Care Instructions. \"     If you do not have an account, please click on the \"Sign Up Now\" link. Current as of: May 27, 2020               Content Version: 12.9  © 2006-2021 [employer]. Care instructions adapted under license by [facility] ([facility]). If you have questions about a medical condition or this instruction, always ask your healthcare professional. [address] any warranty or liability for your use of this information. Patient Education        When You Are Overweight: Care Instructions  Your Care Instructions     If you're overweight, your doctor may recommend that you make changes in your eating and exercise habits. Being overweight can lead to serious health problems, such as high blood pressure, heart disease, type 2 diabetes, and arthritis, or it can make these problems worse. Eating a healthy diet and being more active can help you reach and stay at a healthy weight. You don't have to make huge changes all at once. Start by making small changes in your eating and exercise habits. To lose weight, you need to burn more calories than you take in. You can do this by eating healthy foods in reasonable amounts and becoming more active every day. Follow-up care is a key part of your treatment and safety. Be sure to make and go to all appointments, and call your doctor if you are having problems. It's also a good idea to know your test results and keep a list of the medicines you take. How can you care for yourself at home? · Improve your eating habits. You'll be more successful if you work on changing one eating habit at a time. All foods, if eaten in moderation, can be part of healthy eating. Remember to:  ?  Eat a variety of foods from each food group. Include grains, vegetables, fruits, dairy, and protein foods. ? Limit foods high in fat, sugar, and calories. ? Eat slowly. And don't do anything else, such as watch TV, while you are eating. ? Pay attention to portion sizes. Put your food on a smaller plate. ? Plan your meals ahead of time. You'll be less likely to grab something that's not as healthy. · Get active. Regular activity can help you feel better, have more energy, and burn more calories. If you haven't been active, start slowly. Start with at least 30 minutes of moderate activity on most days of the week. Then gradually increase the amount of activity. Try for 60 or 90 minutes a day, at least 5 days a week. There are a lot of ways to fit activity into your life. You can:  ? Walk or bike to the store. Or walk with a friend, or walk the dog.  ? Mow the lawn, rake leaves, shovel snow, or do some gardening. ? Use the stairs instead of the elevator, at least for a few floors. · Change your thinking. Your thoughts have a lot to do with how you feel and what you do. When you're trying to reach a healthy weight, changing how you think about certain things may help. Here are some ideas:  ? Don't compare yourself to others. Healthy bodies come in all shapes and sizes. ? Pay attention to how hungry or full you feel. When you eat, be aware of why you're eating and how much you're eating. ? Focus on improving your health instead of dieting. Dieting almost never works over the long term. · Ask your doctor about other health professionals who can help you reach a healthy weight. ? A dietitian can help you make healthy changes in your diet. ? An exercise specialist or  can help you develop a safe and effective exercise program.  ? A counselor or psychiatrist can help you cope with issues such as depression, anxiety, or family problems that can make it hard to focus on reaching a healthy weight.   · Get support from your family, your doctor, your friends, a support group--and support yourself. Where can you learn more? Go to https://Euclidpepiceweb.[email or web address]. org and sign in to your [employer] account. Enter C178 in the [employer] box to learn more about \"When You Are Overweight: Care Instructions. \"     If you do not have an account, please click on the \"Sign Up Now\" link. Current as of: March 17, 2021               Content Version: 12.9  © 8124-6320 [employer]. Care instructions adapted under license by [facility] ([facility]). If you have questions about a medical condition or this instruction, always ask your healthcare professional. [address] any warranty or liability for your use of this information. Patient Education        influenza virus vaccine (injection)  Pronunciation:  IN floo EN ellyn MARROQUINK seen  Brand:  Afluria PF Pediatric Quadrivalent [id number], Afluria PF Quadrivalent [id number], Afluria Quadrivalent [id number], Fluad PF [id number], Fluad Quadrivalent PF 4172-1099, Fluarix PF Quadrivalent 6924-2785, Flublok Quadrivalent [id number], Flucelvax PF Quadrivalent [id number], Flucelvax Quadrivalent [id number], FluLaval PF Quadrivalent 8719-7029, Fluzone High-Dose Quadrivalent PF [id number], Fluzone PF Quadrivalent [id number], Fluzone Quadrivalent [id number]  What is the most important information I should know about this vaccine? The injectable influenza virus vaccine (flu shot) is a \"killed virus\" vaccine. Influenza virus vaccine is also available in a nasal spray form, which is a \"live virus\" vaccine. This medication guide addresses only the injectable form of this vaccine. Becoming infected with influenza is much more dangerous to your health than receiving this vaccine. However, like any medicine, this vaccine can cause side effects but the risk of serious side effects is extremely low. What is influenza virus vaccine?   Influenza virus (commonly known as \"the flu\") is a serious disease caused by a virus. Influenza virus can spread from one person to another through small droplets of saliva that are expelled into the air when an infected person coughs or sneezes. The virus can also be passed through contact with objects the infected person has touched, such as a door handle or other surfaces. Influenza virus vaccine is used to prevent infection caused by influenza virus. The vaccine is redeveloped each year to contain specific strains of inactivated (killed) flu virus that are recommended by public health officials for that year. The injectable influenza virus vaccine (flu shot) is a \"killed virus\" vaccine. Influenza virus vaccine is also available in a nasal spray form, which is a \"live virus\" vaccine. Influenza virus vaccine works by exposing you to a small dose of the virus, which helps your body to develop immunity to the disease. Influenza virus vaccine will not treat an active infection that has already developed in the body. Influenza virus vaccine is for use in adults and children who are at least 7 months old. Becoming infected with influenza is much more dangerous to your health than receiving this vaccine. Influenza causes thousands of deaths each year, and hundreds of thousands of hospitalizations. However, like any medicine, this vaccine can cause side effects but the risk of serious side effects is extremely low. Like any vaccine, influenza virus vaccine may not provide protection from disease in every person. This vaccine will not prevent illness caused by samir flu (\"bird flu\"). What should I discuss with my healthcare provider before receiving this vaccine? You may not be able to receive this vaccine if you are allergic to eggs, or if you have:  · a history of severe allergic reaction to a flu vaccine; or  · a history of Guillain-[place] syndrome (within 6 weeks after receiving a flu vaccine).   Tell your doctor if you have ever had:  · bleeding problems;  · a neurologic disorder or disease affecting the brain (or if this was a reaction to a previous vaccine);  · seizures;  · a weak immune system caused by disease, bone marrow transplant, or by using certain medicines or receiving cancer treatments; or  · an allergy to latex. You can still receive a vaccine if you have a minor cold. If you have a severe illness with a fever or any type of infection, wait until you get better before receiving this vaccine. The Centers for Disease Control and Prevention recommends that pregnant women get a flu shot during any trimester of pregnancy to protect themselves and their  babies from flu. The nasal spray form of influenza vaccine is not recommended for use in pregnant women. It may not be safe to breastfeed while using this medicine. Ask your doctor about any risk. This vaccine should not be given to a child younger than 7 months old. How is this vaccine given? Some brands of this vaccine are made for use in adults and not in children. Your child's doctor can recommend the best influenza virus vaccine for your child. This vaccine is given as an injection (shot) into a muscle. You will receive this injection in a doctor's office or other clinic setting. You should receive a flu vaccine every year. Your immunity will gradually decrease over the 12 months after you receive the influenza virus vaccine. Children receiving this vaccine may need a booster shot one month after receiving the first vaccine. The influenza virus vaccine is usually given in October or November. Some people may need to have their vaccines earlier or later. Follow your doctor's instructions. Your doctor may recommend treating fever and pain with an aspirin-free pain reliever such as acetaminophen (Tylenol) or ibuprofen (Motrin, Advil, and others) when the shot is given and for the next 24 hours.  Follow the label directions or your doctor's instructions about how much of this medicine to give your child. It is especially important to prevent fever from occurring in a child who has a seizure disorder such as epilepsy. What happens if I miss a dose? Since flu shots are usually given only one time per year, you will most likely not be on a dosing schedule. Call your doctor if you forget to receive your yearly flu shot in October or November. If your child misses a booster dose of this vaccine, call your doctor for instructions. What happens if I overdose? An overdose of this vaccine is unlikely to occur. What should I avoid before or after receiving this vaccine? Follow your doctor's instructions about any restrictions on food, beverages, or activity. What are the possible side effects of influenza virus injectable vaccine? Get emergency medical help if you have signs of an allergic reaction: hives; difficulty breathing; swelling of your face, lips, tongue, or throat. You should not receive a booster vaccine if you had a life-threatening allergic reaction after the first shot. Keep track of any and all side effects you have after receiving this vaccine. If you ever need to receive influenza virus vaccine in the future, you will need to tell your doctor if the previous shot caused any side effects. Influenza virus injectable (killed virus) vaccine will not cause you to become ill with the flu virus that it contains. However, you may have flu-like symptoms at any time during flu season that may be caused by other strains of influenza virus. Call your doctor at once if you have:  · a light-headed feeling, like you might pass out;  · severe weakness or unusual feeling in your arms and legs (may occur 2 to 4 weeks after you receive the vaccine);  · high fever;  · seizure (convulsions); or  · unusual bleeding.   Common side effects may include:  · low fever, chills;  · mild fussiness or crying;  · redness, bruising, pain, swelling, or a lump where the vaccine was injected;  · headache, tired feeling; or  · joint or muscle pain. This is not a complete list of side effects and others may occur. Call your doctor for medical advice about side effects. You may report vaccine side effects to the [address] [address] and Human Services at [phone]. What other drugs will affect influenza virus injectable vaccine? If you are using any of these medications, you may not be able to receive the vaccine, or may need to wait until the other treatments are finished:  · phenytoin, theophylline, or warfarin (Coumadin, Jantoven);  · an oral, nasal, inhaled, or injectable steroid medicine;  · medications to treat psoriasis, rheumatoid arthritis, or other autoimmune disorders--azathioprine, etanercept, leflunomide, and others; or  · medicines to treat or prevent organ transplant rejection--basiliximab, cyclosporine, muromonab-CD3, mycophenolate mofetil, sirolimus, tacrolimus. This list is not complete. Other drugs may affect influenza virus vaccine, including prescription and over-the-counter medicines, vitamins, and herbal products. Not all possible drug interactions are listed here. Where can I get more information? Your doctor or pharmacist can provide more information about this vaccine. Additional information is available from your local health department or the Centers for Disease Control and Prevention. Remember, keep this and all other medicines out of the reach of children, never share your medicines with others, and use this medication only for the indication prescribed. Every effort has been made to ensure that the information provided by [address] is accurate, up-to-date, and complete, but no guarantee is made to that effect. Drug information contained herein may be time sensitive.  [facility] information has been compiled for use by healthcare practitioners and consumers in the [place] and therefore [facility] does not warrant that uses outside of the [place] are appropriate, unless specifically indicated otherwise. [employer] drug information does not endorse drugs, diagnose patients or recommend therapy. [employer]'s drug information is an informational resource designed to assist licensed healthcare practitioners in caring for their patients and/or to serve consumers viewing this service as a supplement to, and not a substitute for, the expertise, skill, knowledge and judgment of healthcare practitioners. The absence of a warning for a given drug or drug combination in no way should be construed to indicate that the drug or drug combination is safe, effective or appropriate for any given patient. [facility] does not assume any responsibility for any aspect of healthcare administered with the aid of information [facility] provides. The information contained herein is not intended to cover all possible uses, directions, precautions, warnings, drug interactions, allergic reactions, or adverse effects. If you have questions about the drugs you are taking, check with your doctor, nurse or pharmacist.  Copyright 9842-6611 [address]. Version: 8.03. Revision date: 8/19/2020. Care instructions adapted under license by [facility] ([facility]). If you have questions about a medical condition or this instruction, always ask your healthcare professional. [place] [address] any warranty or liability for your use of this information. Patient Education        Testicular Self-Exam: Care Instructions  Your Care Instructions     A self-exam is a way for you to check for cancer of the testicles. Although testicular cancer is rare, it is one of the most common tumors in men younger than 28. Many testicular cancers are found during self-exam. In the early stages of testicular cancer, the lump, which may be about the size of a pea, usually is not painful. Testicular cancer, especially if treated early, is very often cured.   By doing this self-exam regularly, you can learn the normal size, shape, and weight of your testicles. This allows you to note any changes. Follow-up care is a key part of your treatment and safety. Be sure to make and go to all appointments, and call your doctor if you are having problems. It's also a good idea to know your test results and keep a list of the medicines you take. How is it done? · The self-exam is best done during or after a bath or shower--when the scrotum, the skin sac that holds the testicles, is relaxed. · Stand and place your right leg on a raised surface about chair height. Then gently feel your scrotum until you find the right testicle. · Roll the testicle gently but firmly between your thumb and fingers of both hands. Carefully feel the surface for lumps. Feel for any change in the size, shape, or texture of the testicle. The testicle should feel round and smooth. It is normal for one testicle to be slightly larger than the other one. · Repeat this for the left side. Feel the entire surface of both testicles. · You may feel the epididymis, the soft tube behind each testicle. Get familiar with how it feels so that you won't mistake it for a lump. When should you call for help? Call your doctor now or seek immediate medical care if:    · You have pain in a testicle. Watch closely for changes in your health, and be sure to contact your doctor if:    · You notice a change in a testicle.     · You notice a lump in a testicle. Where can you learn more? Go to https://[email or web address].[email or web address]. org and sign in to your [employer] account. Enter C516 in the [employer] box to learn more about \"Testicular Self-Exam: Care Instructions. \"     If you do not have an account, please click on the \"Sign Up Now\" link. Current as of: February 10, 2021               Content Version: 12.9  © 4132-7996 Healthwise, Incorporated. Care instructions adapted under license by [address]  If you have questions about a medical condition or this instruction, always ask your healthcare professional. [place] [address] any warranty or liability for your use of this information. Patient voices understanding and agrees to plans along with risks and benefits of plan. Counseling:  Nida Kaufman case, medications and options were discussed in detail. patient was instructed to call the office if he   questions regarding him treatment. Should him conditions worsen, he should return to office to be reassessed by Dr. Adam Atwood. he  Should to go the closest Emergency Department for any emergency. They verbalized understanding the above instructions.

## 2021-10-08 ENCOUNTER — OFFICE VISIT (OUTPATIENT)
Dept: FAMILY MEDICINE CLINIC | Age: 36
End: 2021-10-08
Payer: COMMERCIAL

## 2021-10-08 VITALS
BODY MASS INDEX: 26.29 KG/M2 | SYSTOLIC BLOOD PRESSURE: 142 MMHG | OXYGEN SATURATION: 96 % | WEIGHT: 178 LBS | HEART RATE: 78 BPM | DIASTOLIC BLOOD PRESSURE: 94 MMHG | TEMPERATURE: 97.3 F

## 2021-10-08 DIAGNOSIS — J30.89 CHRONIC NONSEASONAL ALLERGIC RHINITIS DUE TO POLLEN: ICD-10-CM

## 2021-10-08 DIAGNOSIS — Z11.3 SCREEN FOR STD (SEXUALLY TRANSMITTED DISEASE): Primary | ICD-10-CM

## 2021-10-08 PROCEDURE — 99213 OFFICE O/P EST LOW 20 MIN: CPT | Performed by: FAMILY MEDICINE

## 2021-10-08 NOTE — PROGRESS NOTES
[facility]YSICIAN SERVICES  [facility]  [address] [id number]  [address] [address]  Dept: [phone]  Dept Fax: [phone]  Loc: [phone]    Johnny Puente is a 28 y.o. male who presents today for his medical conditions/complaints as noted below. Johnny Puente is here for Other (STD panel requested )        HPI:   CC: Here today to discuss the following:    He is requesting to be screened for STDs. He is not currently having any symptoms or have any known exposures. Overall allergies are well controlled        HPI    Past Medical History:   Diagnosis Date    Anxiety     Bilateral low back pain without sciatica 9/14/2018    Chronic back pain     Chronic lung disease of prematurity     Depression with anxiety     Gynecomastia 2003    right side    Insomnia     Perennial allergic rhinitis 8/28/2017    Premature infant of 30 weeks gestation     Birth Weight 3lbs 11 oz      Past Surgical History:   Procedure Laterality Date    CYST REMOVAL      MASTECTOMY Right 2003    due to gynecomastia       Family History   Problem Relation Age of Onset    Heart Disease Paternal Grandfather     Prostate Cancer Father     Diabetes Daughter         Type I       Social History     Tobacco Use    Smoking status: Never Smoker    Smokeless tobacco: Never Used   Substance Use Topics    Alcohol use: Yes     Alcohol/week: 0.0 standard drinks     Comment: rarely     Current Outpatient Medications   Medication Sig Dispense Refill    Melatonin 5 MG CAPS 5-10 mg at bedtime as needed for insomnia  5    albuterol sulfate HFA (PROAIR HFA) 108 (90 Base) MCG/ACT inhaler Inhale 2 puffs into the lungs every 4 hours as needed for Wheezing or Shortness of Breath 2 Inhaler 3    Multiple Vitamins-Minerals (THERAPEUTIC MULTIVITAMIN-MINERALS) tablet Take 1 tablet by mouth daily      Fexofenadine HCl (ALLEGRA PO) Take by mouth       No current facility-administered medications for this visit. Allergies   Allergen Reactions    Amoxicillin     Other Rash     STERI STRIPS    Pcn [Penicillins] Rash       Health Maintenance   Topic Date Due    Hepatitis A vaccine (2 of 2 - 2-dose series) 08/21/2007    DTaP/Tdap/Td vaccine (2 - Td or Tdap) 07/05/2028    Pneumococcal 0-64 years Vaccine (2 of 2 - PPSV23) 12/16/2050    Hepatitis B vaccine  Completed    Flu vaccine  Completed    COVID-19 Vaccine  Completed    Hepatitis C screen  Completed    HIV screen  Completed    Hib vaccine  Aged Out    Meningococcal (ACWY) vaccine  Aged Out    Varicella vaccine  Discontinued       Subjective:      Review of Systems    Review of Systems   Constitutional: Negative for chills and fever. HENT: Negative for congestion. Respiratory: Negative for cough, chest tightness and shortness of breath. Cardiovascular: Negative for chest pain, palpitations and leg swelling. Gastrointestinal: Negative for abdominal pain, anal bleeding, constipation, diarrhea and nausea. Genitourinary: Negative for difficulty urinating. Psychiatric/Behavioral: Negative. SeeHPI for visit specific review of symptoms. All others negative      Objective:   BP (!) 142/94   Pulse 78   Temp 97.3 °F (36.3 °C)   Wt 178 lb (80.7 kg)   SpO2 96%   BMI 26.29 kg/m²   Physical Exam  Physical Exam   Constitutional: He appears well-developed. Does not appear ill. Neck: Normal range of motion. Neck supple. No masses. Neck Symmetric. Normal tracheal position. No thyroid enlargement  Cardiovascular: Normal rate and regular rhythm. Exam reveals no friction rub. Carotid arteries: no bruit observed. No murmur heard. Respiratory:  Effort normal and breath sounds normal. No respiratory distress. No wheezes. No rales. No use of accessory muscles or intercostal retractions. Neurological: alert. Psychiatric: normal mood and affect. His behavior is normal. Normal judgement and insight observed.       Recent Results (from the past 672 hour(s))   Hepatitis Panel, Acute    Collection Time: 10/08/21  3:07 PM   Result Value Ref Range    Hep A IgM Non-Reactive Non-reactive    Hep B Core Ab, IgM Non-Reactive Non-reactive    Hep B S Ag Interp Non-Reactive Non-reactive    Hep C Ab Interp Non-Reactive    RPR Reflex to Titer and TPPA    Collection Time: 10/08/21  3:07 PM   Result Value Ref Range    RPR Non-reactive Non-reactive   HIV-1 Combo AG/AB By AILYN, Reflexive Panel    Collection Time: 10/08/21  3:07 PM   Result Value Ref Range    HIV 1+2 AB/AGN Negative Negative               Assessment & Plan: The following diagnoses and conditions are stable with no further orders unless indicated:  1. Screen for STD (sexually transmitted disease)  States he is having no symptoms today  - Herpes simplex virus (HSV) I/II antibodies IgG & IgM w/ reflex; Future  - Hepatitis Panel, Acute; Future  - RPR Reflex to Titer and TPPA; Future  - HIV-1 Combo AG/AB By AILYN, Reflexive Panel; Future    2. Chronic nonseasonal allergic rhinitis due to pollen  Continue with Allegra    BP Readings from Last 3 Encounters:   10/08/21 (!) 142/94   09/21/21 132/86   04/12/21 130/88     Blood pressure was elevated today. Advised him to continue monitoring. It was much better on September 21 and April 12  Discussed DASH diet        Return if symptoms worsen or fail to improve. Discussed use, benefit, and side effects of prescribed medications. All patient questions answered. Pt voiced understanding. Reviewed health maintenance. Instructedto continue current medications, diet and exercise. Patient agreed with treatmentplan. Follow up as directed. Note dictated using Dragon Dictation software  Sometimes this dictation software makes erroneous transcriptions.

## 2021-10-11 DIAGNOSIS — D72.819 LEUKOPENIA, UNSPECIFIED TYPE: Primary | ICD-10-CM

## 2021-10-11 NOTE — PROGRESS NOTES
Progress Note      Pt Name: Amee Fonseca  YOB: 1985  MRN: [id number]    Date of evaluation: 10/12/2021  History Obtained From:  patient, electronic medical record    CHIEF COMPLAINT:    Chief Complaint   Patient presents with    Follow-up     Leukopenia, unspecified type     Current active problems  Leukopenia  Mild copper deficiency    HISTORY OF PRESENT ILLNESS:    Amee Fonseca is a 28 y.o.  male who was seen initially in the office on 5/11/2020 referred by Dr. Alice Nielsen for evaluation of a persistent mild leukopenia. He was found to have a mild copper deficiency and was initially on copper 2 mg daily but changed over to a multivitamin with copper. His WBC has been remaining in a normal range with normal differential since that time. When he was here last visit his hematocrit was slightly elevated. He had been on a keto diet. He has lost the weight that he wanted to lose. Since his last visit here however he has gone through a divorce. He reports he is happier. He does have seasonal allergies but that is stable Allegra. He takes melatonin at night to sleep. He did take the COVID-19 vaccine-he has not contracted COVID-19. HEMATOLOGY HISTORY: Leukopenia, copper deficiency  Carlos Reese was seen in initial hematology consultation on 5/11/2020 referred by Dr. Tish Obregon for evaluation of a persistent mild leukopenia.          Carlos Reese reports that he has known that he has had a low white count for some time. He denies any significant chronic or recurrent infections.   He does have seasonal allergy issues and does have some pulmonary issues at times but has not had recurrent pneumonia issues.     He denies any night sweats, weight loss, extreme fatigue, chronic pruritus.     Prior to his initial visit he did have possibly a food allergy- some spice from a [place], that caused burning and itching across his shoulders and he is currently on prednisone.     He denied any abdominal pain specifically in the left upper quadrant.     His initial CBC in the office on 5/11/2020 revealed that he has WBC was actually normal at 6.31 with a normal percent differential with neutrophils 57.8%, lymphocytes 33.3%, monocytes 6.8%, eosinophils 1.6% and basophil 0.5%. Hgb was normal at 15.9 with MCV 91.3 and PLT was 206,000.     His CBC on his initial visit 5/11/2020 above revealed that his WBC was completely normal.  This could be affected from the steroid today we was on prior to that visit. Physical examination does not reveal any peripheral lymphadenopathy or splenomegaly. He does not have any B symptoms. Serology 5/11/2020  QI - IgG 973, IgA 144, IgM 102 - WNL   SPEP - no M spike with immunofixation negative  Free serum light chains - kappa 8.3, lambda 10.4, K/L ratio 0.8 - WNL  Copper - 70 ()  Zinc - 119  B12 - 455  Folate - 14.3  CMP - WNL  HIV 1/2 - nonreactive    He was started on copper 2 mg daily. His CBC at follow-up on 8/3/2020 revealed that his WBC had decreased to 3.83 with ANC 1.78. I discussed the fact that his [address] was adequate. Percentage differential was within normal limits. His Hgb and PLT continued to be completely normal.  I discussed potential need for bone marrow aspiration biopsy, reason for the procedure, risk, alternatives with him. After discussion we sent peripheral blood smear for evaluation to [facility] he desired to continue to be monitored conservatively. He did not have any recurrent infections. He does not have any splenomegaly, peripheral adenopathy on exam.    PERIPHERAL BLOOD SMEAR 8/3/2022 [facility]  · Normocytic normochromic red blood cells with no significant anisopoikilocytosis  · There is mild leukopenia. Neutrophils have normal morphology and appear to be adequate in number. Monocytes are not increased  · Lymphocytes have heterogenous morphologic features. No left shift and no circulating blasts.   · Platelets appear to be adequate in number with occasional large forms. Serology 8/3/2020  Copper - 78 ()    Peripheral blood smear above was unrevealing for any ominous findings. Past Medical History:   Diagnosis Date    Anxiety     Bilateral low back pain without sciatica 9/14/2018    Chronic back pain     Chronic lung disease of prematurity     Depression with anxiety     Gynecomastia 2003    right side    Insomnia     Perennial allergic rhinitis 8/28/2017    Premature infant of 30 weeks gestation     Birth Weight 3lbs 11 oz        Past Surgical History:   Procedure Laterality Date    CYST REMOVAL      MASTECTOMY Right 2003    due to gynecomastia           Current Outpatient Medications:     Melatonin 5 MG CAPS, 5-10 mg at bedtime as needed for insomnia, Disp: , Rfl: 5    albuterol sulfate HFA (PROAIR HFA) 108 (90 Base) MCG/ACT inhaler, Inhale 2 puffs into the lungs every 4 hours as needed for Wheezing or Shortness of Breath, Disp: 2 Inhaler, Rfl: 3    Multiple Vitamins-Minerals (THERAPEUTIC MULTIVITAMIN-MINERALS) tablet, Take 1 tablet by mouth daily, Disp: , Rfl:     Fexofenadine HCl (ALLEGRA PO), Take by mouth, Disp: , Rfl:      Allergies   Allergen Reactions    Amoxicillin     Other Rash     STERI STRIPS    Pcn [Penicillins] Rash       Social History     Tobacco Use    Smoking status: Never Smoker    Smokeless tobacco: Never Used   Vaping Use    Vaping Use: Never used   Substance Use Topics    Alcohol use: Yes     Alcohol/week: 0.0 standard drinks     Comment: rarely    Drug use: No       Family History   Problem Relation Age of Onset    Heart Disease Paternal Grandfather     Prostate Cancer Father     Diabetes Daughter         Type I       Subjective   REVIEW OF SYSTEMS:   Review of Systems   Constitutional: Negative for chills, diaphoresis, fatigue, fever and unexpected weight change. HENT: Negative for mouth sores, nosebleeds, sore throat, trouble swallowing and voice change.     Eyes: Negative for photophobia, discharge and itching. Respiratory: Negative for cough, shortness of breath and wheezing. Cardiovascular: Negative for chest pain, palpitations and leg swelling. Gastrointestinal: Negative for abdominal distention, abdominal pain, blood in stool, constipation, diarrhea, nausea and vomiting. Endocrine: Negative for cold intolerance, heat intolerance, polydipsia and polyuria. Genitourinary: Negative for difficulty urinating, dysuria, hematuria and urgency. Musculoskeletal: Negative for arthralgias, back pain, joint swelling and myalgias. Skin: Negative for color change and rash. Allergic/Immunologic: Positive for environmental allergies and food allergies ([place] food ). Neurological: Negative for dizziness, tremors, seizures, syncope and light-headedness. Hematological: Negative for adenopathy. Does not bruise/bleed easily. Psychiatric/Behavioral: Negative for behavioral problems and suicidal ideas. The patient is not nervous/anxious. All other systems reviewed and are negative. Objective   /62   Pulse 87   Ht 5' 9\" (1.753 m)   Wt 182 lb 3.2 oz (82.6 kg)   SpO2 96%   BMI 26.91 kg/m²     PHYSICAL EXAM:  Physical Exam  Vitals reviewed. Constitutional:       General: He is not in acute distress. Appearance: He is well-developed. HENT:      Head: Normocephalic and atraumatic. Nose: Nose normal.   Eyes:      General: No scleral icterus. Conjunctiva/sclera: Conjunctivae normal.   Neck:      Vascular: No JVD. Trachea: No tracheal deviation. Cardiovascular:      Rate and Rhythm: Normal rate and regular rhythm. Heart sounds: Normal heart sounds. No murmur heard. Pulmonary:      Effort: Pulmonary effort is normal. No respiratory distress. Breath sounds: Normal breath sounds. No wheezing. Abdominal:      General: Bowel sounds are normal. There is no distension. Palpations: Abdomen is soft. There is no mass. Tenderness: There is no abdominal tenderness. Musculoskeletal:         General: No tenderness or deformity. Normal range of motion. Skin:     Findings: No erythema or rash. Neurological:      Mental Status: He is alert and oriented to person, place, and time. Psychiatric:         Thought Content: Thought content normal.        CBC reviewed by me  Lab Results   Component Value Date    WBC 4.35 10/12/2021    HGB 16.6 10/12/2021    HCT 47.9 10/12/2021    MCV 90.7 10/12/2021     10/12/2021     Lab Results   Component Value Date    NEUTROABS 2.41 10/12/2021       VISIT DIAGNOSES  1. Leukopenia, unspecified type    2. Copper deficiency        ASSESSMENT/PLAN:      Serology 8/3/2020  Copper - 78 ()    Work-up included peripheral blood smear that did not reveal any ominous findings. CBC today reveals a WBC of 4.35 with normal percent differential-55.4% neutrophil, 36.1% lymphocyte, simple 1% monocyte. Hgb is normal at 16.6 and HCT is normal as well today at 47.9. PLT normal at 185,000. He will continue on his multivitamin that has copper and follow-up in 1 year. Immunization History   Administered Date(s) Administered    COVID-19, Pfizer, PF, 30mcg/0.3mL 12/18/2020, 01/08/2021    Influenza, MDCK Quadv, IM, PF (Flucelvax 2 yrs and older) 09/21/2021            Return in about 1 year (around 10/12/2022) for With [employer].      Justyna Oliver PA-C  3:24 PM  10/12/2021

## 2021-10-12 ENCOUNTER — OFFICE VISIT (OUTPATIENT)
Dept: HEMATOLOGY | Age: 36
End: 2021-10-12
Payer: COMMERCIAL

## 2021-10-12 ENCOUNTER — HOSPITAL ENCOUNTER (OUTPATIENT)
Dept: INFUSION THERAPY | Age: 36
Discharge: HOME | End: 2021-10-12
Payer: COMMERCIAL

## 2021-10-12 VITALS
HEART RATE: 87 BPM | WEIGHT: 182.2 LBS | OXYGEN SATURATION: 96 % | SYSTOLIC BLOOD PRESSURE: 130 MMHG | DIASTOLIC BLOOD PRESSURE: 62 MMHG | HEIGHT: 69 IN | BODY MASS INDEX: 26.98 KG/M2

## 2021-10-12 DIAGNOSIS — D72.819 LEUKOPENIA, UNSPECIFIED TYPE: Primary | ICD-10-CM

## 2021-10-12 DIAGNOSIS — D72.819 LEUKOPENIA, UNSPECIFIED TYPE: ICD-10-CM

## 2021-10-12 DIAGNOSIS — E61.0 COPPER DEFICIENCY: ICD-10-CM

## 2021-10-12 LAB
BASOPHILS ABSOLUTE: 0.02 K/UL (ref 0.01–0.08)
BASOPHILS RELATIVE PERCENT: 0.5 % (ref 0.1–1.2)
EOSINOPHILS ABSOLUTE: 0.04 K/UL (ref 0.04–0.54)
EOSINOPHILS RELATIVE PERCENT: 0.9 % (ref 0.7–7)
HCT VFR BLD CALC: 47.9 % (ref 40.1–51)
HEMOGLOBIN: 16.6 G/DL (ref 13.7–17.5)
LYMPHOCYTES ABSOLUTE: 1.57 K/UL (ref 1.18–3.74)
LYMPHOCYTES RELATIVE PERCENT: 36.1 % (ref 19.3–53.1)
MCH RBC QN AUTO: 31.4 PG (ref 25.7–32.2)
MCHC RBC AUTO-ENTMCNC: 34.7 G/DL (ref 32.3–36.5)
MCV RBC AUTO: 90.7 FL (ref 79–92.2)
MONOCYTES ABSOLUTE: 0.31 K/UL (ref 0.24–0.82)
MONOCYTES RELATIVE PERCENT: 7.1 % (ref 4.7–12.5)
NEUTROPHILS ABSOLUTE: 2.41 K/UL (ref 1.56–6.13)
NEUTROPHILS RELATIVE PERCENT: 55.4 % (ref 34–71.1)
PDW BLD-RTO: 12.7 % (ref 11.6–14.4)
PLATELET # BLD: 185 K/UL (ref 163–337)
PMV BLD AUTO: 11.3 FL (ref 7.4–10.4)
RBC # BLD: 5.28 M/UL (ref 4.63–6.08)
WBC # BLD: 4.35 K/UL (ref 4.23–9.07)

## 2021-10-12 PROCEDURE — 99213 OFFICE O/P EST LOW 20 MIN: CPT | Performed by: PHYSICIAN ASSISTANT

## 2021-10-12 PROCEDURE — 99211 OFF/OP EST MAY X REQ PHY/QHP: CPT

## 2021-10-12 PROCEDURE — 36415 COLL VENOUS BLD VENIPUNCTURE: CPT

## 2021-10-12 PROCEDURE — 85025 COMPLETE CBC W/AUTO DIFF WBC: CPT

## 2021-10-12 ASSESSMENT — ENCOUNTER SYMPTOMS
WHEEZING: 0
VOMITING: 0
NAUSEA: 0
ABDOMINAL PAIN: 0
SORE THROAT: 0
EYE DISCHARGE: 0
COUGH: 0
COLOR CHANGE: 0
EYE ITCHING: 0
PHOTOPHOBIA: 0
TROUBLE SWALLOWING: 0
SHORTNESS OF BREATH: 0
DIARRHEA: 0
ABDOMINAL DISTENTION: 0
CONSTIPATION: 0
VOICE CHANGE: 0
BACK PAIN: 0
BLOOD IN STOOL: 0

## 2021-10-16 ENCOUNTER — OFFICE VISIT (OUTPATIENT)
Dept: URGENT CARE | Age: 36
End: 2021-10-16
Payer: COMMERCIAL

## 2021-10-16 VITALS
BODY MASS INDEX: 26.29 KG/M2 | HEART RATE: 73 BPM | DIASTOLIC BLOOD PRESSURE: 88 MMHG | RESPIRATION RATE: 18 BRPM | SYSTOLIC BLOOD PRESSURE: 139 MMHG | TEMPERATURE: 97.4 F | OXYGEN SATURATION: 98 % | WEIGHT: 178 LBS

## 2021-10-16 DIAGNOSIS — L01.00 IMPETIGO: ICD-10-CM

## 2021-10-16 DIAGNOSIS — L73.9 FOLLICULITIS: Primary | ICD-10-CM

## 2021-10-16 PROCEDURE — 99213 OFFICE O/P EST LOW 20 MIN: CPT | Performed by: NURSE PRACTITIONER

## 2021-10-16 RX ORDER — CEPHALEXIN 500 MG/1
500 CAPSULE ORAL 3 TIMES DAILY
Qty: 21 CAPSULE | Refills: 0 | Status: SHIPPED | OUTPATIENT
Start: 2021-10-16 | End: 2021-10-23

## 2021-10-16 ASSESSMENT — PATIENT HEALTH QUESTIONNAIRE - PHQ9: DEPRESSION UNABLE TO ASSESS: PT REFUSES

## 2021-10-16 NOTE — PROGRESS NOTES
[facility]  [address] [address] [place] [address]  Dept: [phone]  Dept Fax: [phone]  Loc: [phone]    Aleksandra Portillo is a 28 y.o. male who presents today for his medical conditions/complaintsas noted below. Aleksandra Portillo is c/o of Rash        HPI:     Rash  This is a new problem. The current episode started in the past 7 days. The problem is unchanged. The affected locations include the neck. The rash is characterized by redness and draining. He was exposed to nothing. Pertinent negatives include no fever. Has recently shaved   Past Medical History:   Diagnosis Date    Anxiety     Bilateral low back pain without sciatica 9/14/2018    Chronic back pain     Chronic lung disease of prematurity     Depression with anxiety     Gynecomastia 2003    right side    Insomnia     Perennial allergic rhinitis 8/28/2017    Premature infant of 30 weeks gestation     Birth Weight 3lbs 11 oz     Past Surgical History:   Procedure Laterality Date    CYST REMOVAL      MASTECTOMY Right 2003    due to gynecomastia       Family History   Problem Relation Age of Onset    Heart Disease Paternal Grandfather     Prostate Cancer Father     Diabetes Daughter         Type I       Social History     Tobacco Use    Smoking status: Never Smoker    Smokeless tobacco: Never Used   Substance Use Topics    Alcohol use: Yes     Alcohol/week: 0.0 standard drinks     Comment: rarely      Current Outpatient Medications   Medication Sig Dispense Refill    cephALEXin (KEFLEX) 500 MG capsule Take 1 capsule by mouth 3 times daily for 7 days 21 capsule 0    mupirocin (BACTROBAN) 2 % ointment Apply 2 times daily until healed.  15 g 0    Melatonin 5 MG CAPS 5-10 mg at bedtime as needed for insomnia  5    albuterol sulfate HFA (PROAIR HFA) 108 (90 Base) MCG/ACT inhaler Inhale 2 puffs into the lungs every 4 hours as needed for Wheezing or Shortness of Breath 2 Inhaler 3    Multiple Vitamins-Minerals (THERAPEUTIC MULTIVITAMIN-MINERALS) tablet Take 1 tablet by mouth daily      Fexofenadine HCl (ALLEGRA PO) Take by mouth       No current facility-administered medications for this visit. Allergies   Allergen Reactions    Amoxicillin     Other Rash     STERI STRIPS    Pcn [Penicillins] Rash       Health Maintenance   Topic Date Due    Hepatitis A vaccine (2 of 2 - 2-dose series) 08/21/2007    DTaP/Tdap/Td vaccine (2 - Td or Tdap) 07/05/2028    Pneumococcal 0-64 years Vaccine (2 of 2 - PPSV23) 12/16/2050    Hepatitis B vaccine  Completed    Flu vaccine  Completed    COVID-19 Vaccine  Completed    Hepatitis C screen  Completed    HIV screen  Completed    Hib vaccine  Aged Out    Meningococcal (ACWY) vaccine  Aged Out    Varicella vaccine  Discontinued       Subjective:     Review of Systems   Constitutional: Negative for fever. Skin: Positive for rash. All other systems reviewed and are negative.      :Objective      Physical Exam  Vitals and nursing note reviewed. Constitutional:       Appearance: Normal appearance. HENT:      Head: Normocephalic and atraumatic. Eyes:      Conjunctiva/sclera: Conjunctivae normal.      Pupils: Pupils are equal, round, and reactive to light. Skin:     General: Skin is warm and dry. Neurological:      Mental Status: He is alert and oriented to person, place, and time. Psychiatric:         Mood and Affect: Mood normal.         Behavior: Behavior normal.       /88   Pulse 73   Temp 97.4 °F (36.3 °C) (Temporal)   Resp 18   Wt 178 lb (80.7 kg)   SpO2 98%   BMI 26.29 kg/m²     :Assessment       Diagnosis Orders   1. Folliculitis  cephALEXin (KEFLEX) 500 MG capsule    mupirocin (BACTROBAN) 2 % ointment   2. Impetigo  mupirocin (BACTROBAN) 2 % ointment       :Plan   1. Antibiotic and antibiotic ointment as prescribed   2. Return to clinic with new or worsening symptoms   3.  Follow up with PCP as needed      No orders of the defined types were placed in this encounter. No follow-ups on file. Orders Placed This Encounter   Medications    cephALEXin (KEFLEX) 500 MG capsule     Sig: Take 1 capsule by mouth 3 times daily for 7 days     Dispense:  21 capsule     Refill:  0    mupirocin (BACTROBAN) 2 % ointment     Sig: Apply 2 times daily until healed. Dispense:  15 g     Refill:  0       Patient given educational materials- see patient instructions. Discussed use, benefit, and side effects of prescribedmedications. All patient questions answered. Pt voiced understanding. There are no Patient Instructions on file for this visit.       Electronically signed by MITZI Decker on 10/16/2021 at 5:19 PM

## 2021-11-01 ENCOUNTER — OFFICE VISIT (OUTPATIENT)
Dept: UROLOGY | Facility: CLINIC | Age: 36
End: 2021-11-01

## 2021-11-01 VITALS — TEMPERATURE: 97.8 F | HEIGHT: 69 IN | WEIGHT: 187.4 LBS | BODY MASS INDEX: 27.76 KG/M2

## 2021-11-01 DIAGNOSIS — Z30.09 VASECTOMY EVALUATION: Primary | ICD-10-CM

## 2021-11-01 PROCEDURE — 99203 OFFICE O/P NEW LOW 30 MIN: CPT | Performed by: UROLOGY

## 2021-11-01 RX ORDER — ALPRAZOLAM 2 MG/1
2 TABLET ORAL NIGHTLY PRN
Qty: 1 TABLET | Refills: 0 | Status: SHIPPED | OUTPATIENT
Start: 2021-11-01 | End: 2021-11-09

## 2021-11-01 RX ORDER — HYDROCORTISONE 1 %
OINTMENT (GRAM) TOPICAL
COMMUNITY
End: 2021-11-09

## 2021-11-01 RX ORDER — MULTIPLE VITAMINS W/ MINERALS TAB 9MG-400MCG
1 TAB ORAL DAILY
COMMUNITY

## 2021-11-01 RX ORDER — HYDROCODONE BITARTRATE AND ACETAMINOPHEN 7.5; 325 MG/1; MG/1
1 TABLET ORAL EVERY 6 HOURS PRN
Qty: 12 TABLET | Refills: 0 | Status: SHIPPED | OUTPATIENT
Start: 2021-11-01 | End: 2021-11-09

## 2021-11-01 RX ORDER — FEXOFENADINE HCL 180 MG/1
TABLET ORAL
COMMUNITY
End: 2021-11-09

## 2021-11-08 ENCOUNTER — OFFICE VISIT (OUTPATIENT)
Dept: PSYCHIATRY | Age: 36
End: 2021-11-08
Payer: COMMERCIAL

## 2021-11-08 DIAGNOSIS — F41.1 GAD (GENERALIZED ANXIETY DISORDER): ICD-10-CM

## 2021-11-08 DIAGNOSIS — F43.9 SITUATIONAL STRESS: ICD-10-CM

## 2021-11-08 DIAGNOSIS — F33.41 MDD (MAJOR DEPRESSIVE DISORDER), RECURRENT, IN PARTIAL REMISSION (HCC): Primary | ICD-10-CM

## 2021-11-08 PROCEDURE — 90834 PSYTX W PT 45 MINUTES: CPT | Performed by: SOCIAL WORKER
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
## 2021-11-08 NOTE — PATIENT INSTRUCTIONS
Recommendations to patient:                            6. Practice new coping, stress management, relaxation skills at least                   two times a day for at least 10-30 minutes.              2. Find at least one positive outlet per day that makes you feel better.              3. Talk things over with a good friend. Practice letting things go.              4. Stop, breathe, reset. \"I am ok. \"              5. I could do something with my brother.               8. I am fine the way I am.               7. Its ok to say No.                 Scheduled follow up appointment. Amparo Chavez, [phone] and choose the option for behavioral health  You can also send her a message on My Chart. Be aware that all my messages are linked to her.

## 2021-12-20 ENCOUNTER — OFFICE VISIT (OUTPATIENT)
Dept: PSYCHIATRY | Age: 36
End: 2021-12-20
Payer: COMMERCIAL

## 2021-12-20 DIAGNOSIS — F33.41 MDD (MAJOR DEPRESSIVE DISORDER), RECURRENT, IN PARTIAL REMISSION (HCC): Primary | ICD-10-CM

## 2021-12-20 DIAGNOSIS — F41.1 GAD (GENERALIZED ANXIETY DISORDER): ICD-10-CM

## 2021-12-20 DIAGNOSIS — F43.9 SITUATIONAL STRESS: ICD-10-CM

## 2021-12-20 PROCEDURE — 90834 PSYTX W PT 45 MINUTES: CPT | Performed by: SOCIAL WORKER
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
## 2021-12-20 NOTE — PATIENT INSTRUCTIONS
Recommendations to patient:                            2. Practice new coping, stress management, relaxation skills at least                   two times a day for at least 10-30 minutes.              2. Find at least one positive outlet per day that makes you feel better.              3. Talk things over with a good friend. Practice letting things go.              4. Stop, breathe, reset. \"I am ok. \"              5. I could do something with my brother.               4. I am fine the way I am.               7. Its ok to say No.                 Scheduled follow up appointment.     Amparo May, [phone] and choose the option for behavioral health  You can also send her a message on My Chart. Be aware that all my messages are linked to her. Assertive Communication     Assertiveness Is Simple but Hard    NonAssertive   Assertive   Aggressive     (Passive)            (Tactful)             (Rude)           H onest         X  H onest          X  H onest             X A ppropriate        X  A ppropriate                 A ppropriate             X R espectful        X  R espectful             R espectful     D irect                   X  D irect        X  D irect      Assertiveness involves respecting your rights and the rights of others. Important Facts About Assertiveness      Use I or me statements such as When you do ______, I feel _____.     Voice tone, eye contact, and body posture are important parts of assertive communication.  Use a steady and calm voice, stand or sit up straight, look the other person in the eyes without glaring.  Feelings are usually only one word (e.g. angry, anxious, happy, sad, hurt, frustrated, joyful)    Remember, assertiveness doesnt guarantee that you will get what you want or that the other person will understand your concerns or be happy with what you said.  It does improve the chances that the other person will understand what you want or how you feel and thus improve your chances of communicating effectively. Four Essential Steps to Assertive Communication   1. Tell the person what you think about their behavior without accusing them. 2. Tell them how you feel when they behave a certain way. 3. Tell them how their behavior affects you and your relationship with them. 4. Tell them what you would prefer them to do instead. XYZ* Formula for Effective Communication   The goal of the XYZ* formula is to express the way you feel (internal world) in response to others behavior (external world) in specific situations. You are the only person who has access to your feelings. Others have no access to your internal world. The only way they will know what you are feeling is if you tell them. Similarly, you only have access to other peoples external world. It is very easy to make a mistake when trying to guess what others are feeling or intending. I feel X  when you do Y  in situation Z  and I would like *    I feel angry  when you leave your socks and underwear on the bedroom floor  after work  and I would like you to put them in the hamper. I felt insignificant  when you left me with an empty gas tank  yesterday  and I would like you to leave the car with at least 1/4 tank of gas. I feel angry  when you dont call me  if you are staying late at work  and I would like you to call as soon as you know you will be late. I feel loved  when you kiss me  when you get home  and I would like you to do that everyday.
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
## 2022-04-12 LAB
CHOLESTEROL, TOTAL: 176 MG/DL (ref 160–199)
GLUCOSE BLD-MCNC: 95 MG/DL (ref 74–109)
HDLC SERPL-MCNC: 55 MG/DL (ref 55–121)
LDL CHOLESTEROL CALCULATED: 106 MG/DL
TRIGL SERPL-MCNC: 73 MG/DL (ref 0–149)

## 2022-05-25 ENCOUNTER — OFFICE VISIT (OUTPATIENT)
Dept: PSYCHIATRY | Age: 37
End: 2022-05-25
Payer: COMMERCIAL

## 2022-05-25 DIAGNOSIS — F33.41 MDD (MAJOR DEPRESSIVE DISORDER), RECURRENT, IN PARTIAL REMISSION (HCC): Primary | ICD-10-CM

## 2022-05-25 DIAGNOSIS — F41.1 GAD (GENERALIZED ANXIETY DISORDER): ICD-10-CM

## 2022-05-25 DIAGNOSIS — F43.9 SITUATIONAL STRESS: ICD-10-CM

## 2022-05-25 PROCEDURE — 90834 PSYTX W PT 45 MINUTES: CPT | Performed by: SOCIAL WORKER
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
## 2022-05-25 NOTE — PATIENT INSTRUCTIONS
Recommendations to patient:      1. Practice new coping, stress management, relaxation skills at least                   two times a day for at least 10-30 minutes. 2. Find at least one positive outlet per day that makes you feel better. 3. Talk things over with a good friend. Practice letting things go. 4. Stop, breathe, reset. \"I am ok. \"   5. Introduce them, go to the park. Scheduled follow up appointment. Call for a sooner appointment if needed or if you need to change or cancel you appointment. Amparo Chavez, [phone] and choose the option for behavioral health  You can also send her a message on My Chart. Be aware that all my messages are linked to her. If you receive a survey after visiting one of our offices, please take time to share your experience about your office visit. These surveys are confidential and no health information about you is shared. We highly welcome your feedback to continuously improve our services for you. When to say Yes  How to say No  To take control of your life      [place] [employer] [address] Kennieth Boeck Dr. Lovetta Hind for Objective Effectiveness:    Getting What You Want      A way to remember these skills is to remember:  Lauren Srinivasan   Describe the situation when necessary - sometimes it isn't stick to the facts and no judgmental statements   \"I've been working here for 2 years now and haven't received a raise, even though my performance reviews have always been positive\"   \"This is the third time this week that you've asked me for a ride home. \"       EXPRESS  Express feelings/opinions about the situation clearly. describe how you feel or what you believe about the situation. don't expect the other person to read your mind or know how you feel give a brief reason for making your request.   \"I believe that I deserve a raise. \"   \"I'm getting home so late that it is really hard for me and my family. But I also really enjoy giving you rides home, and it is hard for me to say no. \"       Eliud Sell your wishes. Ask for what you want. Say no clearly. Don't expect the other person to know what you want them to do if you don't tell them (don't expect them to mind read). Don't tell others what they \"should\" do. Don't beat around the andrews. Cassie Rosario Just bite the bullet and ask, or say no   \"I would like a raise. Can you give it to me? \"But I have to say no tonight. I can't give you a ride home so often. \"       REINFORCE   Reward people who respond positively to you when you ask for something, say no or express an opinion. Sometimes it helps to reinforce people before they respond to your question by telling them the positive effects of getting what you want or need. The basic idea here is that if people do not gain form complying with a request, at least some of the time, they may stop responding in a positive way   \"I will be a lot happier and probably more productive if I get a salary that reflects my value to the company. \"   \"Thanks for being so understanding. I really appreciate it. \"       STAY MINDFUL   Keep your focus on your objectives in the situation   Maintain your position   Don't be distracted on to another topic   Two helpful techniques for staying mindful:  1. Broken Record    Keep asking, saying no or expressing your opinion. ..over and over and over   You just don't have to think up something new each time, just keep saying the exact same thing. Keep a mellow tone of voice. ...your strength comes from maintaining your position   2. Ignore   If the other person attacks, threatens or tries to change the subject, .... IGNORE, the   threats comments or efforts to divert you. Just keep making your point. If you respond to these attacks, you have allowed the other person to take control of the situation   If you want to deal with the attacks. ..deal with them in another discussion. APPEAR CONFIDENT   Confident tone of voice   Confident physical manner   Appropriate eye contact   No stammering, whispering, staring at the floor, etc... How confident to act in a situation is a judgment call. There is a fine line between appearing arrogant, and appearing too apologetic. NEGOTIATE   Be willing to give to get   Offer and ask for alternate solutions   Reduce your request   Maintain your no, but offer to do something else or solve the problem another way   A helpful skill here is \"turning the tables. \" Turn the problem over to the other person, ask for alternative solutions. \"What do you think we can do. \" \"I am not able to say yes, but you really seem to want me to. What can we do here? \"   \"How can we solve this problem? \"       Using DEAR MAN skills in really difficult situations   Some people have really good skills themselves, and keep refusing your legitimate requests, or pestering you to do something you don't want to do. *Use the same [facility] SAM ESCALANTE\" skills, but change the focus to the current interaction. 1. Describe the current interaction   \"You keep asking me over and over again even though I have already said no. \"   Avoid blaming the other person. ..i.e. don't say \"you just don't want to hear me\"   2. Express your opinions/feelings of discomfort about the interaction   I'm not sure that you understand what I am asking\"   I'm starting to feel angry about this. \"   3. Assert your wishes   when the other person is refusing a request, suggest that you put off the conversation to another time   Give the person another chance to think about it   When the other person is pestering you, ask them to stop   4. Reinforce   when saying no to someone who keeps asking. ...suggest that you end the conversation because you aren't going to change your mind anyway  Relationships Effectiveness Skills. ..using skills to maintain or improve a relationship, while you are trying to get what you want. Assertive Communication     Assertiveness Is Simple but Hard    NonAssertive   Assertive   Aggressive     (Passive)            (Tactful)             (Rude)           H onest         X  H onest          X  H onest             X A ppropriate        X  A ppropriate                 A ppropriate             X R espectful        X  R espectful             R espectful     D irect                   X  D irect        X  D irect      Assertiveness involves respecting your rights and the rights of others. Important Facts About Assertiveness      Use I or me statements such as When you do ______, I feel _____.     Voice tone, eye contact, and body posture are important parts of assertive communication.  Use a steady and calm voice, stand or sit up straight, look the other person in the eyes without glaring.  Feelings are usually only one word (e.g. angry, anxious, happy, sad, hurt, frustrated, joyful)    Remember, assertiveness doesnt guarantee that you will get what you want or that the other person will understand your concerns or be happy with what you said. It does improve the chances that the other person will understand what you want or how you feel and thus improve your chances of communicating effectively. Four Essential Steps to Assertive Communication   1. Tell the person what you think about their behavior without accusing them. 2. Tell them how you feel when they behave a certain way. 3. Tell them how their behavior affects you and your relationship with them. 4. Tell them what you would prefer them to do instead. XYZ* Formula for Effective Communication   The goal of the XYZ* formula is to express the way you feel (internal world) in response to others behavior (external world) in specific situations. You are the only person who has access to your feelings. Others have no access to your internal world. The only way they will know what you are feeling is if you tell them. Similarly, you only have access to other peoples external world. It is very easy to make a mistake when trying to guess what others are feeling or intending. I feel X  when you do Y  in situation Z  and I would like *    I feel angry  when you leave your socks and underwear on the bedroom floor  after work  and I would like you to put them in the hamper. I felt insignificant  when you left me with an empty gas tank  yesterday  and I would like you to leave the car with at least 1/4 tank of gas. I feel angry  when you dont call me  if you are staying late at work  and I would like you to call as soon as you know you will be late. I feel loved  when you kiss me  when you get home  and I would like you to do that everyday. Problem-Solving. When we are uncertain about what action can be taken to handle a situation, using the problem-solving steps below can help point us in the right direction. 1. Get information about the problem. We often know something is wrong but dont know exactly what. Identifying the problem in clear, specific terms is essential to resolving it. 2. Generate options for resolving the problem. There are usually a number of different ways of dealing with a problem. At this step, its important to come up with as many potential solutions as possible, even if they dont sound quite right at first.  Suspend any judgment of the options until you have generated as many as possible. 3. Evaluate the advantages and disadvantages of implementing each option. Not all options will be as good as others. Think about how successful you think each option will be at resolving the problem, and what might get in the way of that option actually working. 4. Choose an option and implement it. Which option do you think will work the best overall? Make a plan for implementing the option, and put it into action. 5. Analyze whether the option worked to resolve the problem.   Keep track of what changes occur as you implement the option chosen. If the option worked, the problem is resolved and you are done. If not, you need to re-think the situation, by repeating the process. 6. Repeat Steps 1 through 5 as needed.

## 2022-08-25 ENCOUNTER — HOSPITAL ENCOUNTER (OUTPATIENT)
Dept: ULTRASOUND IMAGING | Age: 37
Discharge: HOME | End: 2022-08-25
Payer: COMMERCIAL

## 2022-08-25 ENCOUNTER — OFFICE VISIT (OUTPATIENT)
Dept: FAMILY MEDICINE CLINIC | Age: 37
End: 2022-08-25
Payer: COMMERCIAL

## 2022-08-25 VITALS
OXYGEN SATURATION: 98 % | WEIGHT: 195.13 LBS | HEART RATE: 88 BPM | HEIGHT: 69 IN | SYSTOLIC BLOOD PRESSURE: 128 MMHG | DIASTOLIC BLOOD PRESSURE: 76 MMHG | TEMPERATURE: 97.8 F | BODY MASS INDEX: 28.9 KG/M2

## 2022-08-25 DIAGNOSIS — N50.812 PAIN IN BOTH TESTICLES: ICD-10-CM

## 2022-08-25 DIAGNOSIS — M54.41 BILATERAL LOW BACK PAIN WITH BILATERAL SCIATICA, UNSPECIFIED CHRONICITY: ICD-10-CM

## 2022-08-25 DIAGNOSIS — M62.830 MUSCLE SPASM OF BACK: ICD-10-CM

## 2022-08-25 DIAGNOSIS — N50.811 PAIN IN BOTH TESTICLES: ICD-10-CM

## 2022-08-25 DIAGNOSIS — M54.42 BILATERAL LOW BACK PAIN WITH BILATERAL SCIATICA, UNSPECIFIED CHRONICITY: ICD-10-CM

## 2022-08-25 DIAGNOSIS — M54.42 BILATERAL LOW BACK PAIN WITH BILATERAL SCIATICA, UNSPECIFIED CHRONICITY: Primary | ICD-10-CM

## 2022-08-25 DIAGNOSIS — E66.3 OVERWEIGHT: ICD-10-CM

## 2022-08-25 DIAGNOSIS — M54.41 BILATERAL LOW BACK PAIN WITH BILATERAL SCIATICA, UNSPECIFIED CHRONICITY: Primary | ICD-10-CM

## 2022-08-25 DIAGNOSIS — R10.31 RIGHT LOWER QUADRANT ABDOMINAL PAIN: ICD-10-CM

## 2022-08-25 LAB
ALBUMIN SERPL-MCNC: 4.8 G/DL (ref 3.5–5.2)
ALP BLD-CCNC: 66 U/L (ref 40–130)
ALT SERPL-CCNC: 20 U/L (ref 5–41)
ANION GAP SERPL CALCULATED.3IONS-SCNC: 7 MMOL/L (ref 7–19)
APPEARANCE FLUID: CLEAR
AST SERPL-CCNC: 17 U/L (ref 5–40)
BASOPHILS ABSOLUTE: 0 K/UL (ref 0–0.2)
BASOPHILS RELATIVE PERCENT: 1.1 % (ref 0–1)
BILIRUB SERPL-MCNC: 0.9 MG/DL (ref 0.2–1.2)
BILIRUBIN, POC: NORMAL
BLOOD URINE, POC: NORMAL
BUN BLDV-MCNC: 13 MG/DL (ref 6–20)
C-REACTIVE PROTEIN: <0.3 MG/DL (ref 0–0.5)
CALCIUM SERPL-MCNC: 9.5 MG/DL (ref 8.6–10)
CHLORIDE BLD-SCNC: 104 MMOL/L (ref 98–111)
CLARITY, POC: CLEAR
CO2: 31 MMOL/L (ref 22–29)
COLOR, POC: YELLOW
CREAT SERPL-MCNC: 0.9 MG/DL (ref 0.5–1.2)
EOSINOPHILS ABSOLUTE: 0.1 K/UL (ref 0–0.6)
EOSINOPHILS RELATIVE PERCENT: 2.5 % (ref 0–5)
GFR AFRICAN AMERICAN: >59
GFR NON-AFRICAN AMERICAN: >60
GLUCOSE BLD-MCNC: 90 MG/DL (ref 74–109)
GLUCOSE URINE, POC: NORMAL
HCT VFR BLD CALC: 47.7 % (ref 42–52)
HEMOGLOBIN: 16.2 G/DL (ref 14–18)
IMMATURE GRANULOCYTES #: 0 K/UL
KETONES, POC: NORMAL
LEUKOCYTE EST, POC: NORMAL
LYMPHOCYTES ABSOLUTE: 1.6 K/UL (ref 1.1–4.5)
LYMPHOCYTES RELATIVE PERCENT: 43.2 % (ref 20–40)
MCH RBC QN AUTO: 29.9 PG (ref 27–31)
MCHC RBC AUTO-ENTMCNC: 34 G/DL (ref 33–37)
MCV RBC AUTO: 88 FL (ref 80–94)
MONOCYTES ABSOLUTE: 0.3 K/UL (ref 0–0.9)
MONOCYTES RELATIVE PERCENT: 9.3 % (ref 0–10)
NEUTROPHILS ABSOLUTE: 1.6 K/UL (ref 1.5–7.5)
NEUTROPHILS RELATIVE PERCENT: 43.9 % (ref 50–65)
NITRITE, POC: NORMAL
PDW BLD-RTO: 12.8 % (ref 11.5–14.5)
PH, POC: 7
PLATELET # BLD: 260 K/UL (ref 130–400)
PMV BLD AUTO: 10.2 FL (ref 9.4–12.4)
POTASSIUM SERPL-SCNC: 4 MMOL/L (ref 3.5–5)
PROTEIN, POC: NORMAL
RBC # BLD: 5.42 M/UL (ref 4.7–6.1)
SODIUM BLD-SCNC: 142 MMOL/L (ref 136–145)
SPECIFIC GRAVITY, POC: 1.01
TOTAL PROTEIN: 7.1 G/DL (ref 6.6–8.7)
UROBILINOGEN, POC: 0.2
WBC # BLD: 3.7 K/UL (ref 4.8–10.8)

## 2022-08-25 PROCEDURE — 81003 URINALYSIS AUTO W/O SCOPE: CPT | Performed by: INTERNAL MEDICINE

## 2022-08-25 PROCEDURE — 76870 US EXAM SCROTUM: CPT

## 2022-08-25 PROCEDURE — 76870 US EXAM SCROTUM: CPT | Performed by: RADIOLOGY

## 2022-08-25 PROCEDURE — 99214 OFFICE O/P EST MOD 30 MIN: CPT | Performed by: INTERNAL MEDICINE

## 2022-08-25 RX ORDER — METHYLPREDNISOLONE 4 MG/1
TABLET ORAL
Qty: 1 KIT | Refills: 0 | Status: SHIPPED | OUTPATIENT
Start: 2022-08-25 | End: 2022-08-31

## 2022-08-25 RX ORDER — CYCLOBENZAPRINE HCL 5 MG
5 TABLET ORAL 3 TIMES DAILY PRN
Qty: 30 TABLET | Refills: 0 | Status: SHIPPED | OUTPATIENT
Start: 2022-08-25 | End: 2022-09-04

## 2022-08-25 ASSESSMENT — ENCOUNTER SYMPTOMS
EYE DISCHARGE: 0
SHORTNESS OF BREATH: 0
VOMITING: 0
VOICE CHANGE: 0
EYE REDNESS: 0
CHEST TIGHTNESS: 0
WHEEZING: 0
RHINORRHEA: 0
BLOOD IN STOOL: 0
SINUS PRESSURE: 0
COLOR CHANGE: 0
ABDOMINAL PAIN: 0
COUGH: 0
BACK PAIN: 1
EYE PAIN: 0
SORE THROAT: 0
DIARRHEA: 0

## 2022-08-25 ASSESSMENT — PATIENT HEALTH QUESTIONNAIRE - PHQ9
SUM OF ALL RESPONSES TO PHQ9 QUESTIONS 1 & 2: 0
8. MOVING OR SPEAKING SO SLOWLY THAT OTHER PEOPLE COULD HAVE NOTICED. OR THE OPPOSITE, BEING SO FIGETY OR RESTLESS THAT YOU HAVE BEEN MOVING AROUND A LOT MORE THAN USUAL: 0
10. IF YOU CHECKED OFF ANY PROBLEMS, HOW DIFFICULT HAVE THESE PROBLEMS MADE IT FOR YOU TO DO YOUR WORK, TAKE CARE OF THINGS AT HOME, OR GET ALONG WITH OTHER PEOPLE: 0
1. LITTLE INTEREST OR PLEASURE IN DOING THINGS: 0
SUM OF ALL RESPONSES TO PHQ QUESTIONS 1-9: 3
SUM OF ALL RESPONSES TO PHQ QUESTIONS 1-9: 3
9. THOUGHTS THAT YOU WOULD BE BETTER OFF DEAD, OR OF HURTING YOURSELF: 0
SUM OF ALL RESPONSES TO PHQ QUESTIONS 1-9: 3
4. FEELING TIRED OR HAVING LITTLE ENERGY: 3
5. POOR APPETITE OR OVEREATING: 0
SUM OF ALL RESPONSES TO PHQ QUESTIONS 1-9: 3
3. TROUBLE FALLING OR STAYING ASLEEP: 0
7. TROUBLE CONCENTRATING ON THINGS, SUCH AS READING THE NEWSPAPER OR WATCHING TELEVISION: 0
6. FEELING BAD ABOUT YOURSELF - OR THAT YOU ARE A FAILURE OR HAVE LET YOURSELF OR YOUR FAMILY DOWN: 0
2. FEELING DOWN, DEPRESSED OR HOPELESS: 0

## 2022-08-25 NOTE — PROGRESS NOTES
Niya Elizabeth is a 39 y.o. male who presents today for   Chief Complaint   Patient presents with    Testicle Pain    Back Pain       HPI  40 y/o WM here for c/o new onset bilateral testicular pain for the past week without swelling but he has also been having some central LBP intermittently also. Pain sometimes radiates down one leg or the other from lower back to posterior knees. He notes he also has RUQ pain intermittently that worries him about gallbladder or appendix. No dysuria, hematuria, or difficulty urinating. He sometimes gets paresthesias in his legs also. He does climb ladders and he stand a lot during the day and he will have more pain if he is due to get new work shoes. Review of Systems   Constitutional:  Negative for appetite change, chills, fatigue and fever. HENT:  Negative for ear pain, rhinorrhea, sinus pressure, sore throat and voice change. Eyes:  Negative for pain, discharge and redness. Respiratory:  Negative for cough, chest tightness, shortness of breath and wheezing. Cardiovascular:  Negative for chest pain and palpitations. Gastrointestinal:  Negative for abdominal pain, blood in stool, diarrhea and vomiting. Endocrine: Negative for cold intolerance, heat intolerance, polydipsia and polyuria. Genitourinary:  Positive for testicular pain. Negative for difficulty urinating, dysuria, enuresis, frequency, genital sores, hematuria, penile discharge, penile pain, penile swelling and scrotal swelling. Musculoskeletal:  Positive for back pain and myalgias. Negative for arthralgias, neck pain and neck stiffness. Skin:  Negative for color change and rash. Neurological:  Negative for dizziness, tremors, syncope, speech difficulty, weakness, numbness and headaches. Hematological:  Negative for adenopathy. Does not bruise/bleed easily. Psychiatric/Behavioral:  Negative for confusion, dysphoric mood and sleep disturbance. The patient is not nervous/anxious.     All other systems reviewed and are negative. Past Medical History:   Diagnosis Date    Anxiety     Bilateral low back pain without sciatica 9/14/2018    Chronic back pain     Chronic lung disease of prematurity     Depression with anxiety     Gynecomastia 2003    right side    Insomnia     Perennial allergic rhinitis 8/28/2017    Premature infant of 30 weeks gestation     Birth Weight 3lbs 11 oz       Current Outpatient Medications   Medication Sig Dispense Refill    methylPREDNISolone (MEDROL DOSEPACK) 4 MG tablet Take by mouth. 1 kit 0    cyclobenzaprine (FLEXERIL) 5 MG tablet Take 1 tablet by mouth 3 times daily as needed for Muscle spasms 30 tablet 0    Melatonin 5 MG CAPS 5-10 mg at bedtime as needed for insomnia  5    albuterol sulfate HFA (PROAIR HFA) 108 (90 Base) MCG/ACT inhaler Inhale 2 puffs into the lungs every 4 hours as needed for Wheezing or Shortness of Breath 2 Inhaler 3    Multiple Vitamins-Minerals (THERAPEUTIC MULTIVITAMIN-MINERALS) tablet Take 1 tablet by mouth daily      Fexofenadine HCl (ALLEGRA PO) Take by mouth       No current facility-administered medications for this visit. Allergies   Allergen Reactions    Amoxicillin     Other Rash     STERI STRIPS    Pcn [Penicillins] Rash       Past Surgical History:   Procedure Laterality Date    CYST REMOVAL      MASTECTOMY Right 2003    due to gynecomastia       Social History     Tobacco Use    Smoking status: Never    Smokeless tobacco: Never   Vaping Use    Vaping Use: Never used   Substance Use Topics    Alcohol use: Yes     Alcohol/week: 0.0 standard drinks     Comment: rarely    Drug use: No       Family History   Problem Relation Age of Onset    Heart Disease Paternal Grandfather     Prostate Cancer Father     Diabetes Daughter         Type I       /76   Pulse 88   Temp 97.8 °F (36.6 °C)   Ht 5' 9\" (1.753 m)   Wt 195 lb 2 oz (88.5 kg)   SpO2 98%   BMI 28.81 kg/m²     Physical Exam  Vitals reviewed.  Exam conducted with a chaperone present. Constitutional:       General: He is not in acute distress. Appearance: Normal appearance. He is well-developed, well-groomed and overweight. He is not ill-appearing or toxic-appearing. HENT:      Head: Normocephalic and atraumatic. Right Ear: External ear normal.      Left Ear: External ear normal.      Nose: Nose normal.      Mouth/Throat:      Lips: Pink. Mouth: Mucous membranes are moist.   Eyes:      General:         Right eye: No discharge. Left eye: No discharge. Conjunctiva/sclera: Conjunctivae normal.      Pupils: Pupils are equal, round, and reactive to light. Neck:      Thyroid: No thyromegaly. Vascular: Normal carotid pulses. No carotid bruit or JVD. Trachea: Trachea and phonation normal. No tracheal tenderness. Cardiovascular:      Rate and Rhythm: Normal rate and regular rhythm. Pulses:           Carotid pulses are 2+ on the right side and 2+ on the left side. Posterior tibial pulses are 2+ on the right side and 2+ on the left side. Heart sounds: Normal heart sounds. No murmur heard. No friction rub. No gallop. Pulmonary:      Effort: Pulmonary effort is normal. No accessory muscle usage. Breath sounds: Normal breath sounds. No decreased breath sounds, wheezing, rhonchi or rales. Chest:   Breasts:     Right: No supraclavicular adenopathy. Left: No supraclavicular adenopathy. Abdominal:      General: Bowel sounds are normal. There is no distension. Palpations: Abdomen is soft. There is no mass. Tenderness: There is no abdominal tenderness. There is no guarding or rebound. Hernia: No hernia is present. There is no hernia in the left inguinal area or right inguinal area. Genitourinary:     Pubic Area: No rash. Penis: Normal and circumcised. Testes:         Right: Tenderness and varicocele present. Mass or testicular hydrocele not present. Left: Tenderness and varicocele present. Mass or testicular hydrocele not present. Epididymis:      Right: Not enlarged. Tenderness present. Left: Not enlarged. Tenderness present. Musculoskeletal:         General: No swelling or deformity. Right wrist: Normal.      Left wrist: Normal.      Cervical back: Normal range of motion and neck supple. No rigidity. No muscular tenderness. Thoracic back: Spasms and tenderness present. No bony tenderness. No scoliosis. Lumbar back: Spasms and tenderness present. No bony tenderness. Negative right straight leg raise test and negative left straight leg raise test. No scoliosis. Right lower leg: No edema. Left lower leg: No edema. Right ankle: Normal.      Left ankle: Normal.   Lymphadenopathy:      Cervical: No cervical adenopathy. Upper Body:      Right upper body: No supraclavicular adenopathy. Left upper body: No supraclavicular adenopathy. Lower Body: No right inguinal adenopathy. No left inguinal adenopathy. Skin:     General: Skin is warm. Capillary Refill: Capillary refill takes less than 2 seconds. Coloration: Skin is not cyanotic. Findings: No rash. Nails: There is no clubbing. Neurological:      Mental Status: He is alert and oriented to person, place, and time. Cranial Nerves: No cranial nerve deficit or dysarthria. Motor: No weakness, tremor or abnormal muscle tone. Coordination: Coordination normal.      Gait: Gait is intact. Comments: CN II-XII grossly intact, speech clear, no facial droop, MAEW   Psychiatric:         Attention and Perception: Attention and perception normal.         Mood and Affect: Mood and affect normal.         Speech: Speech normal.         Behavior: Behavior normal. Behavior is cooperative. Thought Content:  Thought content normal.         Cognition and Memory: Cognition and memory normal.         Judgment: Judgment normal.     POCT UA-normal  No results found for this visit on 08/25/22. Assessment:    ICD-10-CM    1. Bilateral low back pain with bilateral sciatica, unspecified chronicity  M54.42 POCT Urinalysis no Micro    M54.41 CBC with Auto Differential     Comprehensive Metabolic Panel     C-Reactive Protein     methylPREDNISolone (MEDROL DOSEPACK) 4 MG tablet     cyclobenzaprine (FLEXERIL) 5 MG tablet      2. Pain in both testicles  N50.811 POCT Urinalysis no Micro    N50.812 CBC with Auto Differential     Comprehensive Metabolic Panel     C-Reactive Protein     US SCROTUM AND TESTICLES      3. Right lower quadrant abdominal pain  R10.31 POCT Urinalysis no Micro     CBC with Auto Differential     Comprehensive Metabolic Panel     C-Reactive Protein      4. Muscle spasm of back  M62.830 methylPREDNISolone (MEDROL DOSEPACK) 4 MG tablet     cyclobenzaprine (FLEXERIL) 5 MG tablet      5. Overweight  E66.3           Plan:  aMriajose Cali was seen today for testicle pain and back pain. Diagnoses and all orders for this visit:    Bilateral low back pain with bilateral sciatica, unspecified chronicity  -     POCT Urinalysis no Micro; Future  -     CBC with Auto Differential; Future  -     Comprehensive Metabolic Panel; Future  -     C-Reactive Protein; Future  -     methylPREDNISolone (MEDROL DOSEPACK) 4 MG tablet; Take by mouth. -     cyclobenzaprine (FLEXERIL) 5 MG tablet; Take 1 tablet by mouth 3 times daily as needed for Muscle spasms    Pain in both testicles  -     POCT Urinalysis no Micro; Future  -     CBC with Auto Differential; Future  -     Comprehensive Metabolic Panel; Future  -     C-Reactive Protein; Future  -     US SCROTUM AND TESTICLES; Future    Right lower quadrant abdominal pain  -     POCT Urinalysis no Micro; Future  -     CBC with Auto Differential; Future  -     Comprehensive Metabolic Panel; Future  -     C-Reactive Protein; Future    Muscle spasm of back  -     methylPREDNISolone (MEDROL DOSEPACK) 4 MG tablet; Take by mouth.   -     cyclobenzaprine (FLEXERIL) 5 MG tablet; Take 1 tablet by mouth 3 times daily as needed for Muscle spasms    Overweight      Return if symptoms worsen or fail to improve. Over 50% of the total visit time of 30 minutes was spent on counseling and/or coordination of care of:   1. Bilateral low back pain with bilateral sciatica, unspecified chronicity    2. Pain in both testicles    3. Right lower quadrant abdominal pain    4. Muscle spasm of back    5. Overweight         Orders Placed This Encounter   Procedures    US SCROTUM AND TESTICLES     This procedure can be scheduled via [facility]. Access your [facility] account by visiting [email or web address]. Standing Status:   Future     Standing Expiration Date:   8/25/2023     Order Specific Question:   Reason for exam:     Answer:   bilateral testicular pain, possible varicocele on left    CBC with Auto Differential     Standing Status:   Future     Standing Expiration Date:   8/25/2023    Comprehensive Metabolic Panel     Standing Status:   Future     Standing Expiration Date:   8/25/2023    C-Reactive Protein     Standing Status:   Future     Standing Expiration Date:   8/25/2023    POCT Urinalysis no Micro     Standing Status:   Future     Standing Expiration Date:   8/25/2023       Orders Placed This Encounter   Medications    methylPREDNISolone (MEDROL DOSEPACK) 4 MG tablet     Sig: Take by mouth. Dispense:  1 kit     Refill:  0    cyclobenzaprine (FLEXERIL) 5 MG tablet     Sig: Take 1 tablet by mouth 3 times daily as needed for Muscle spasms     Dispense:  30 tablet     Refill:  0       There are no discontinued medications. There are no Patient Instructions on file for this visit. Patient voices understanding and agrees to plans along with risks and benefits of plan. Counseling:  Leandro Kaminski case, medications and options were discussed in detail. patient was instructed tocall the office if he   questions regarding him treatment.  Should him conditions worsen, he should return to office to be reassessed by Dr. Francheska Dhaliwal. he  Should to go the closest Emergency Department for any emergency. They verbalized understanding the above instructions.

## 2022-08-28 DIAGNOSIS — R93.89 ABNORMAL FINDING ON ULTRASOUND: ICD-10-CM

## 2022-08-28 DIAGNOSIS — N50.811 PAIN IN BOTH TESTICLES: Primary | ICD-10-CM

## 2022-08-28 DIAGNOSIS — I86.1 LEFT VARICOCELE: ICD-10-CM

## 2022-08-28 DIAGNOSIS — N50.812 PAIN IN BOTH TESTICLES: Primary | ICD-10-CM

## 2022-09-22 ENCOUNTER — OFFICE VISIT (OUTPATIENT)
Dept: FAMILY MEDICINE CLINIC | Age: 37
End: 2022-09-22
Payer: COMMERCIAL

## 2022-09-22 VITALS
HEART RATE: 77 BPM | WEIGHT: 197 LBS | BODY MASS INDEX: 29.18 KG/M2 | DIASTOLIC BLOOD PRESSURE: 74 MMHG | OXYGEN SATURATION: 96 % | HEIGHT: 69 IN | TEMPERATURE: 98.2 F | SYSTOLIC BLOOD PRESSURE: 122 MMHG

## 2022-09-22 DIAGNOSIS — M62.830 SPASM OF MUSCLE OF LOWER BACK: ICD-10-CM

## 2022-09-22 DIAGNOSIS — G89.29 CHRONIC BACK PAIN GREATER THAN 3 MONTHS DURATION: ICD-10-CM

## 2022-09-22 DIAGNOSIS — M25.521 BILATERAL ELBOW JOINT PAIN: ICD-10-CM

## 2022-09-22 DIAGNOSIS — M25.522 BILATERAL ELBOW JOINT PAIN: ICD-10-CM

## 2022-09-22 DIAGNOSIS — E66.3 OVERWEIGHT: ICD-10-CM

## 2022-09-22 DIAGNOSIS — M89.9 DISORDER OF HYOID BONE: ICD-10-CM

## 2022-09-22 DIAGNOSIS — Z00.00 ENCOUNTER FOR WELL ADULT EXAM WITHOUT ABNORMAL FINDINGS: Primary | ICD-10-CM

## 2022-09-22 DIAGNOSIS — M54.9 CHRONIC BACK PAIN GREATER THAN 3 MONTHS DURATION: ICD-10-CM

## 2022-09-22 PROCEDURE — 99214 OFFICE O/P EST MOD 30 MIN: CPT | Performed by: INTERNAL MEDICINE

## 2022-09-22 PROCEDURE — 99395 PREV VISIT EST AGE 18-39: CPT | Performed by: INTERNAL MEDICINE

## 2022-09-22 ASSESSMENT — ENCOUNTER SYMPTOMS
DIARRHEA: 0
WHEEZING: 0
CHEST TIGHTNESS: 0
COLOR CHANGE: 0
VOICE CHANGE: 0
BACK PAIN: 1
EYE PAIN: 0
VOMITING: 0
SORE THROAT: 0
RHINORRHEA: 0
EYE REDNESS: 0
BLOOD IN STOOL: 0
SINUS PRESSURE: 0
ABDOMINAL PAIN: 0
SHORTNESS OF BREATH: 0
COUGH: 0
EYE DISCHARGE: 0

## 2022-09-22 ASSESSMENT — VISUAL ACUITY: OU: 1

## 2022-09-22 NOTE — PROGRESS NOTES
Well Adult Note  Name: Faith Reading Date: 10/21/2022   MRN: [id number] Sex: Male   Age: 39 y.o. Ethnicity: Non- / Non    : 1985 Race: White (non-)      Roni Age is here for well adult exam.  History:  40 y/o WM here for annual wellness. His low back pain has improved since last visit but he has gotten lighter work boots and he completed a medrol dose pack also. He has only taken the muscle relaxer occasionally but tries not to take them very often. Stretching his back in the morning also seems to help. Patient has been having bilateral elbow and some left shoulder pain since he has been working out more. He was involved in martial arts in the past for quite some time. He also feels like there is a \"clicking sensation\" on right side of his neck like the bone above his fuentes's apple moves when he presses on this area or sometimes when he swallows or pushes his tongue to the roof of his mouth which has worsened since he had Covid earlier this year. Review of Systems   Constitutional:  Negative for appetite change, chills, fatigue and fever. HENT:  Negative for ear pain, rhinorrhea, sinus pressure, sore throat and voice change. Eyes:  Negative for pain, discharge and redness. Respiratory:  Negative for cough, chest tightness, shortness of breath and wheezing. Cardiovascular:  Negative for chest pain and palpitations. Gastrointestinal:  Negative for abdominal pain, blood in stool, diarrhea and vomiting. Endocrine: Negative for cold intolerance, heat intolerance and polydipsia. Genitourinary:  Negative for dysuria and hematuria. Musculoskeletal:  Positive for arthralgias, back pain and myalgias. Negative for neck pain and neck stiffness. See HPI   Skin:  Negative for color change and rash. Neurological:  Negative for dizziness, tremors, syncope, speech difficulty, weakness, numbness and headaches. Hematological:  Negative for adenopathy.  Does not bruise/bleed easily. Psychiatric/Behavioral:  Negative for confusion, dysphoric mood and sleep disturbance. The patient is not nervous/anxious. All other systems reviewed and are negative. Allergies   Allergen Reactions    Amoxicillin     Other Rash     STERI STRIPS    Pcn [Penicillins] Rash         Prior to Visit Medications    Medication Sig Taking?  Authorizing Provider   Melatonin 5 MG CAPS 5-10 mg at bedtime as needed for insomnia Yes Beau Cruz MD   albuterol sulfate HFA (PROAIR HFA) 108 (90 Base) MCG/ACT inhaler Inhale 2 puffs into the lungs every 4 hours as needed for Wheezing or Shortness of Breath Yes Beau Cruz MD   Multiple Vitamins-Minerals (THERAPEUTIC MULTIVITAMIN-MINERALS) tablet Take 1 tablet by mouth daily Yes Historical Provider, MD   Fexofenadine HCl (ALLEGRA PO) Take by mouth Yes Historical Provider, MD   indomethacin (INDOCIN) 50 MG capsule Take 1 capsule by mouth 3 times daily  MITZI Caro CNP   cyclobenzaprine (FLEXERIL) 5 MG tablet Take 1 tablet by mouth 2 times daily as needed for Muscle spasms  MITZI Caro CNP   tamsulosin (FLOMAX) 0.4 MG capsule Take 1 capsule by mouth at bedtime  MITZI Cagle CNP         Past Medical History:   Diagnosis Date    Anxiety     Bilateral low back pain without sciatica 9/14/2018    Chronic back pain     Chronic lung disease of prematurity     Depression with anxiety     Gynecomastia 2003    right side    Insomnia     Perennial allergic rhinitis 8/28/2017    Premature infant of 30 weeks gestation     Birth Weight 3lbs 11 oz       Past Surgical History:   Procedure Laterality Date    CYST REMOVAL      MASTECTOMY Right 2003    due to gynecomastia         Family History   Problem Relation Age of Onset    Heart Disease Paternal Grandfather     Prostate Cancer Father     Diabetes Daughter         Type I       Social History     Tobacco Use    Smoking status: Never    Smokeless tobacco: Never   Vaping Use    Vaping Use: Never used   Substance Use Topics    Alcohol use: Yes     Alcohol/week: 0.0 standard drinks     Comment: rarely    Drug use: No       Objective   /74   Pulse 77   Temp 98.2 °F (36.8 °C)   Ht 5' 9\" (1.753 m)   Wt 197 lb (89.4 kg)   SpO2 96%   BMI 29.09 kg/m²   Wt Readings from Last 3 Encounters:   10/17/22 208 lb 3.2 oz (94.4 kg)   10/11/22 206 lb 1.6 oz (93.5 kg)   09/22/22 197 lb (89.4 kg)     There were no vitals filed for this visit. Physical Exam  Vitals and nursing note reviewed. Constitutional:       General: He is not in acute distress. Appearance: Normal appearance. He is well-developed, well-groomed and overweight. He is not ill-appearing, toxic-appearing or diaphoretic. HENT:      Head: Normocephalic and atraumatic. Right Ear: Tympanic membrane, ear canal and external ear normal.      Left Ear: Tympanic membrane, ear canal and external ear normal.      Nose: Nose normal.      Mouth/Throat:      Lips: Pink. Mouth: Mucous membranes are moist. No oral lesions. Tongue: No lesions. Palate: No mass and lesions. Pharynx: Oropharynx is clear. Uvula midline. No pharyngeal swelling, oropharyngeal exudate, posterior oropharyngeal erythema or uvula swelling. Tonsils: 1+ on the right. 1+ on the left. Eyes:      General: Lids are normal. Vision grossly intact. No scleral icterus. Extraocular Movements: Extraocular movements intact. Conjunctiva/sclera: Conjunctivae normal.      Pupils: Pupils are equal, round, and reactive to light. Neck:      Thyroid: No thyroid mass or thyromegaly. Vascular: No carotid bruit or JVD. Trachea: Trachea and phonation normal.   Cardiovascular:      Rate and Rhythm: Normal rate and regular rhythm. Pulses: Normal pulses. Radial pulses are 2+ on the right side and 2+ on the left side. Posterior tibial pulses are 2+ on the right side and 2+ on the left side.       Heart sounds: Normal heart sounds. No murmur heard. No friction rub. No gallop. Pulmonary:      Effort: Pulmonary effort is normal. No accessory muscle usage. Breath sounds: Normal breath sounds. No decreased breath sounds, wheezing, rhonchi or rales. Abdominal:      General: Abdomen is flat. Bowel sounds are normal. There is no distension. Palpations: Abdomen is soft. There is no hepatomegaly, splenomegaly or mass. Tenderness: There is no abdominal tenderness. There is no guarding or rebound. Hernia: No hernia is present. Musculoskeletal:         General: Normal range of motion. Right wrist: Normal.      Left wrist: Normal.      Cervical back: Normal, normal range of motion and neck supple. Thoracic back: Spasms and tenderness present. Normal range of motion. Lumbar back: Spasms and tenderness present. No bony tenderness. Normal range of motion. No scoliosis. Back:       Right lower leg: No edema. Left lower leg: No edema. Right ankle: Normal.      Left ankle: Normal.   Lymphadenopathy:      Head:      Right side of head: No submandibular adenopathy. Left side of head: No submandibular adenopathy. Cervical: No cervical adenopathy. Right cervical: No superficial, deep or posterior cervical adenopathy. Left cervical: No superficial, deep or posterior cervical adenopathy. Upper Body:      Right upper body: No supraclavicular adenopathy. Left upper body: No supraclavicular adenopathy. Lower Body: No right inguinal adenopathy. No left inguinal adenopathy. Skin:     General: Skin is warm and dry. Capillary Refill: Capillary refill takes less than 2 seconds. Coloration: Skin is not cyanotic. Findings: No rash. Nails: There is no clubbing. Neurological:      Mental Status: He is alert and oriented to person, place, and time. Cranial Nerves: No cranial nerve deficit, dysarthria or facial asymmetry.       Sensory: Sensation is intact. Motor: Motor function is intact. No weakness, tremor, atrophy or abnormal muscle tone. Coordination: Coordination is intact. Romberg sign negative. Coordination normal.      Gait: Gait is intact. Deep Tendon Reflexes: Reflexes are normal and symmetric. Reflex Scores:       Brachioradialis reflexes are 2+ on the right side and 2+ on the left side. Patellar reflexes are 2+ on the right side and 2+ on the left side. Comments: CN II-XII grossly intact, speech clear, MAEW, no focal deficits   Psychiatric:         Attention and Perception: Attention and perception normal.         Mood and Affect: Mood and affect normal.         Speech: Speech normal.         Behavior: Behavior normal. Behavior is cooperative. Thought Content:  Thought content normal.         Cognition and Memory: Cognition and memory normal.         Judgment: Judgment normal.       Lab Results   Component Value Date    WBC 5.41 10/11/2022    HGB 16.6 10/11/2022    HCT 49.0 10/11/2022    MCV 89.1 10/11/2022     10/11/2022    LYMPHOPCT 16.9 (L) 10/11/2022    RBC 5.50 10/11/2022    MCH 30.2 10/11/2022    MCHC 33.9 10/11/2022    RDW 13.0 10/11/2022     Lab Results   Component Value Date     08/25/2022    K 4.0 08/25/2022     08/25/2022    CO2 31 (H) 08/25/2022    BUN 13 08/25/2022    CREATININE 0.9 08/25/2022    GLUCOSE 90 08/25/2022    CALCIUM 9.5 08/25/2022    PROT 7.1 08/25/2022    LABALBU 4.8 08/25/2022    BILITOT 0.9 08/25/2022    ALKPHOS 66 08/25/2022    AST 17 08/25/2022    ALT 20 08/25/2022    LABGLOM >60 08/25/2022    GFRAA >59 08/25/2022    AGRATIO 1.9 (H) 05/11/2020    GLOB 2.5 05/11/2020        Lab Results   Component Value Date    CHOL 176 04/12/2022    CHOL 160 08/04/2021    CHOL 167 03/05/2020     Lab Results   Component Value Date    TRIG 73 04/12/2022    TRIG 94 08/04/2021    TRIG 83 03/05/2020     Lab Results   Component Value Date    HDL 55 04/12/2022    HDL 54 (L) 08/04/2021    HDL 47 (L) 03/05/2020     Lab Results   Component Value Date    LDLCALC 106 04/12/2022    LDLCALC 87 08/04/2021    LDLCALC 103 03/05/2020           Assessment   Plan   1. Encounter for well adult exam without abnormal findings  2. Bilateral elbow joint pain  -     [facility]  3. Chronic back pain greater than 3 months duration  -     [facility]  4. Spasm of muscle of lower back  -     [facility]  5. Disorder of hyoid bone  -     [facility] CERVICAL SPINE (2-3 VIEWS); Future  6. Overweight       Over 50% of the total visit time of 30 minutes was spent on counseling and/or coordination of care of:   1. Encounter for well adult exam without abnormal findings    2. Bilateral elbow joint pain    3. Chronic back pain greater than 3 months duration    4. Spasm of muscle of lower back    5. Disorder of hyoid bone    6.  Overweight          Personalized Preventive Plan   Current Health Maintenance Status  Immunization History   Administered Date(s) Administered    COVID-19, PFIZER PURPLE top, DILUTE for use, (age 15 y+), 30mcg/0.3mL 12/18/2020, 01/08/2021, 10/04/2021    Hepatitis B 12/14/2005    Influenza Virus Vaccine 11/21/2019, 10/01/2020    Influenza, FLUBLOK, (age 25 y+), PF, 0.5mL 09/14/2018    Influenza, FLUCELVAX, (age 10 mo+), MDCK, PF, 0.5mL 09/21/2021    Measles/Rubella 09/20/2004, 12/14/2005    Pneumococcal Polysaccharide ([id number]) 09/21/2020    Td (Adult), 2 Lf Tetanus Toxoid, Pf (Td, Absorbed) 09/20/2004    Tdap (Boostrix, Adacel) 07/05/2018        Health Maintenance   Topic Date Due    Hepatitis A vaccine (2 of 2 - 2-dose series) 08/21/2007    COVID-19 Vaccine (4 - Booster for Pfizer series) 11/29/2021    Flu vaccine (1) 08/01/2022    Depression Monitoring  08/25/2023    DTaP/Tdap/Td vaccine (4 - Td or Tdap) 07/05/2028    Pneumococcal 0-64 years Vaccine  Completed    Hepatitis C screen  Completed    HIV screen  Completed Hib vaccine  Aged Out    Meningococcal (ACWY) vaccine  Aged Out    Varicella vaccine  Discontinued     Recommendations for Preventive Services Due: see orders and patient instructions/AVS.    Return in 1 year (on 9/22/2023) for [address] [place] [address].

## 2022-09-22 NOTE — PATIENT INSTRUCTIONS
Well Visit, Ages 25 to 72: Care Instructions  Well visits can help you stay healthy. Your doctor has checked your overall health and may have suggested ways to take good care of yourself. Your doctor also may have recommended tests. You can help prevent illness with healthy eating, good sleep, vaccinations, regular exercise, and other steps. Get the tests that you and your doctor decide on. Depending on your age and risks, examples might include screening for diabetes; hepatitis C; HIV; and cervical, breast, lung, and colon cancer. Screening helps find diseases before any symptoms appear. Eat healthy foods. Choose fruits, vegetables, whole grains, lean protein, and low-fat dairy foods. Limit saturated fat and reduce salt. Limit alcohol. Men should have no more than 2 drinks a day. Women should have no more than 1. For some people, no alcohol is the best choice. Exercise. Get at least 30 minutes of exercise on most days of the week. Walking can be a good choice. Reach and stay at your healthy weight. This will lower your risk for many health problems. Take care of your mental health. Try to stay connected with friends, family, and community, and find ways to manage stress. If you're feeling depressed or hopeless, talk to someone. A counselor can help. If you don't have a counselor, talk to your doctor. Talk to your doctor if you think you may have a problem with alcohol or drug use. This includes prescription medicines and illegal drugs. Avoid tobacco and nicotine: Don't smoke, vape, or chew. If you need help quitting, talk to your doctor. Practice safer sex. Getting tested, using condoms or dental dams, and limiting sex partners can help prevent STIs. Use birth control if it's important to you to prevent pregnancy. Talk with your doctor about your choices and what might be best for you. Prevent problems where you can.  Protect your skin from too much sun, wash your hands, brush your teeth twice a day, and wear a seat belt in the car. Where can you learn more? Go to https://chpepiceweb.[employer] org and sign in to your [employer] account. Enter P072 in the [facility] box to learn more about \"Well Visit, Ages 25 to 72: Care Instructions. \"     If you do not have an account, please click on the \"Sign Up Now\" link. Current as of: March 9, 2022               Content Version: 13.4  © 2006-2022 [employer]. Care instructions adapted under license by [facility] ([facility]). If you have questions about a medical condition or this instruction, always ask your healthcare professional. [place] [address] any warranty or liability for your use of this information. A Healthy Lifestyle: Care Instructions  A healthy lifestyle can help you feel good, have more energy, and stay at a weight that's healthy for you. You can share a healthy lifestyle with your friends and family. And you can do it on your own. Eat meals with your friends or family. You could try cooking together. Plan activities with other people. Go for a walk with a friend, try a free online fitness class, or join a sports league. Eat a variety of healthy foods. These include fruits, vegetables, whole grains, low-fat dairy, and lean protein. Choose healthy portions of food. You can use the Nutrition Facts label on food packages as a guide. Eat more fruits and vegetables. You could add vegetables to sandwiches or add fruit to cereal.  Drink water when you are thirsty. Limit soda, juice, and sports drinks. Try to exercise most days. Aim for at least 2½ hours of exercise each week. Keep moving. Work in the garden or take your dog on a walk. Use the stairs instead of the elevator. If you use tobacco or nicotine, try to quit. Ask your doctor about programs and medicines to help you quit. Limit alcohol. Men should have no more than 2 drinks a day. Women should have no more than 1.  For some people, no alcohol is the best choice. Follow-up care is a key part of your treatment and safety. Be sure to make and go to all appointments, and call your doctor if you are having problems. It's also a good idea to know your test results and keep a list of the medicines you take. Where can you learn more? Go to https://chpepiceweb.healthPayStand. org and sign in to your [employer] account. Enter Q094 in the [employer] box to learn more about \"A Healthy Lifestyle: Care Instructions. \"     If you do not have an account, please click on the \"Sign Up Now\" link. Current as of: March 9, 2022               Content Version: 13.4  © 2006-2022 [employer]. Care instructions adapted under license by [facility] ([facility]). If you have questions about a medical condition or this instruction, always ask your healthcare professional. [place] [address] any warranty or liability for your use of this information. Heart-Healthy Diet   Sodium, Fat, and Cholesterol Controlled Diet       What Is a Heart Healthy Diet? A heart-healthy diet is one that limits sodium , certain types of fat , and cholesterol . This type of diet is recommended for:   People with any form of cardiovascular disease (eg, coronary heart disease , peripheral vascular disease , previous heart attack , previous stroke )   People with risk factors for cardiovascular disease, such as high blood pressure , high cholesterol , or diabetes   Anyone who wants to lower their risk of developing cardiovascular disease   Sodium    Sodium is a mineral found in many foods. In general, most people consume much more sodium than they need. Diets high in sodium can increase blood pressure and lead to edema (water retention). On a heart-healthy diet, you should consume no more than 2,300 mg (milligrams) of sodium per dayabout the amount in one teaspoon of table salt.  The foods highest in sodium include table salt (about 50% sodium), processed foods, convenience foods, and preserved foods. Cholesterol    Cholesterol is a fat-like, waxy substance in your blood. Our bodies make some cholesterol. It is also found in animal products, with the highest amounts in fatty meat, egg yolks, whole milk, cheese, shellfish, and organ meats. On a heart-healthy diet, you should limit your cholesterol intake to less than 200 mg per day. It is normal and important to have some cholesterol in your bloodstream. But too much cholesterol can cause plaque to build up within your arteries, which can eventually lead to a heart attack or stroke. The two types of cholesterol that are most commonly referred to are:   Low-density lipoprotein (LDL) cholesterol  Also known as bad cholesterol, this is the cholesterol that tends to build up along your arteries. Bad cholesterol levels are increased by eating fats that are saturated or hydrogenated. Optimal level of this cholesterol is less than 100. Over 130 starts to get risky for heart disease. High-density lipoprotein (HDL) cholesterol  Also known as good cholesterol, this type of cholesterol actually carries cholesterol away from your arteries and may, therefore, help lower your risk of having a heart attack. You want this level to be high (ideally greater than 60). It is a risk to have a level less than 40. You can raise this good cholesterol by eating olive oil, canola oil, avocados, or nuts. Exercise raises this level, too. Fat    Fat is calorie dense and packs a lot of calories into a small amount of food. Even though fats should be limited due to their high calorie content, not all fats are bad. In fact, some fats are quite healthful. Fat can be broken down into four main types.    The good-for-you fats are:   Monounsaturated fat  found in oils such as olive and canola, avocados, and nuts and natural nut butters; can decrease cholesterol levels, while keeping levels of HDL cholesterol high   Polyunsaturated fat  found in oils such as safflower, sunflower, soybean, corn, and sesame; can decrease total cholesterol and LDL cholesterol   Omega-3 fatty acids  particularly those found in fatty fish (such as salmon, trout, tuna, mackerel, herring, and sardines); can decrease risk of arrhythmias, decrease triglyceride levels, and slightly lower blood pressure   The fats that you want to limit are:   Saturated fat  found in animal products, many fast foods, and a few vegetables; increases total blood cholesterol, including LDL levels   Animal fats that are saturated include: butter, lard, whole-milk dairy products, meat fat, and poultry skin   Vegetable fats that are saturated include: hydrogenated shortening, palm oil, coconut oil, cocoa butter   Hydrogenated or trans fat  found in margarine and vegetable shortening, most shelf stable snack foods, and fried foods; increases LDL and decreases HDL     It is generally recommended that you limit your total fat for the day to less than 30% of your total calories. If you follow an 1800-calorie heart healthy diet, for example, this would mean 60 grams of fat or less per day. Saturated fat and trans fat in your diet raises your blood cholesterol the most, much more than dietary cholesterol does. For this reason, on a heart-healthy diet, less than 7% of your calories should come from saturated fat and ideally 0% from trans fat. On an 1800-calorie diet, this translates into less than 14 grams of saturated fat per day, leaving 46 grams of fat to come from mono- and polyunsaturated fats.    Food Choices on a Heart Healthy Diet   Food Category   Foods Recommended   Foods to Avoid   Grains   Breads and rolls without salted tops Most dry and cooked cereals Unsalted crackers and breadsticks Low-sodium or homemade breadcrumbs or stuffing All rice and pastas   Breads, rolls, and crackers with salted tops High-fat baked goods (eg, muffins, donuts, pastries) Quick breads, self-rising flour, and biscuit mixes Regular bread crumbs Instant hot cereals Commercially prepared rice, pasta, or stuffing mixes   Vegetables   Most fresh, frozen, and low-sodium canned vegetables Low-sodium and salt-free vegetable juices Canned vegetables if unsalted or rinsed   Regular canned vegetables and juices, including sauerkraut and pickled vegetables Frozen vegetables with sauces Commercially prepared potato and vegetable mixes   Fruits   Most fresh, frozen, and canned fruits All fruit juices   Fruits processed with salt or sodium   Milk   Nonfat or low-fat (1%) milk Nonfat or low-fat yogurt Cottage cheese, low-fat ricotta, cheeses labeled as low-fat and low-sodium   Whole milk Reduced-fat (2%) milk Malted and chocolate milk Full fat yogurt Most cheeses (unless low-fat and low salt) Buttermilk (no more than 1 cup per week)   Meats and Beans   Lean cuts of fresh or frozen beef, veal, lamb, or pork (look for the word loin) Fresh or frozen poultry without the skin Fresh or frozen fish and some shellfish Egg whites and egg substitutes (Limit whole eggs to three per week) Tofu Nuts or seeds (unsalted, dry-roasted), low-sodium peanut butter Dried peas, beans, and lentils   Any smoked, cured, salted, or canned meat, fish, or poultry (including driscoll, chipped beef, cold cuts, hot dogs, sausages, sardines, and anchovies) Poultry skins Breaded and/or fried fish or meats Canned peas, beans, and lentils Salted nuts   Fats and Oils   Olive oil and canola oil Low-sodium, low-fat salad dressings and mayonnaise   Butter, margarine, coconut and palm oils, driscoll fat   Snacks, Sweets, and Condiments   Low-sodium or unsalted versions of broths, soups, soy sauce, and condiments Pepper, herbs, and spices; vinegar, lemon, or lime juice Low-fat frozen desserts (yogurt, sherbet, fruit bars) Sugar, cocoa powder, honey, syrup, jam, and preserves Low-fat, trans-fat free cookies, cakes, and pies Raza and animal crackers, fig bars, tamica snaps   High-fat desserts Broth, soups, gravies, and sauces, made from instant mixes or other high-sodium ingredients Salted snack foods Canned olives Meat tenderizers, seasoning salt, and most flavored vinegars   Beverages   Low-sodium carbonated beverages Tea and coffee in moderation Soy milk   Commercially softened water   Suggestions   Make whole grains, fruits, and vegetables the base of your diet. Choose heart-healthy fats such as canola, olive, and flaxseed oil, and foods high in heart-healthy fats, such as nuts, seeds, soybeans, tofu, and fish. Eat fish at least twice per week; the fish highest in omega-3 fatty acids and lowest in mercury include salmon, herring, mackerel, sardines, and canned chunk light tuna. If you eat fish less than twice per week or have high triglycerides, talk to your doctor about taking fish oil supplements. Read food labels. For products low in fat and cholesterol, look for fat free, low-fat, cholesterol free, saturated fat free, and trans fat freeAlso scan the Nutrition Facts Label, which lists saturated fat, trans fat, and cholesterol amounts. For products low in sodium, look for sodium free, very low sodium, low sodium, no added salt, and unsalted   Skip the salt when cooking or at the table; if food needs more flavor, get creative and try out different herbs and spices. Garlic and onion also add substantial flavor to foods. Trim any visible fat off meat and poultry before cooking, and drain the fat off after ramos. Use cooking methods that require little or no added fat, such as grilling, boiling, baking, poaching, broiling, roasting, steaming, stir-frying, and sauting. Avoid fast food and convenience food. They tend to be high in saturated and trans fat and have a lot of added salt. Talk to a registered dietitian for individualized diet advice.       Last Reviewed: March 2011 Jorge A Lombardi MS, MPH, RD   Updated: 3/29/2011

## 2022-09-29 ENCOUNTER — HOSPITAL ENCOUNTER (OUTPATIENT)
Dept: PHYSICAL THERAPY | Age: 37
Setting detail: THERAPIES SERIES
Discharge: HOME | End: 2022-09-29
Payer: COMMERCIAL

## 2022-09-29 PROCEDURE — 97162 PT EVAL MOD COMPLEX 30 MIN: CPT

## 2022-09-29 ASSESSMENT — PAIN DESCRIPTION - LOCATION: LOCATION: BACK;ELBOW

## 2022-09-29 ASSESSMENT — PAIN SCALES - GENERAL: PAINLEVEL_OUTOF10: 3

## 2022-09-29 ASSESSMENT — PAIN DESCRIPTION - FREQUENCY: FREQUENCY: INTERMITTENT

## 2022-09-29 ASSESSMENT — PAIN DESCRIPTION - ORIENTATION: ORIENTATION: RIGHT

## 2022-09-29 NOTE — PROGRESS NOTES
Physical Therapy  Initial Assessment  Date: 2022  Patient Name: Amee Fonseca  MRN: [id number]  : 1985    Referring Physician: Humza Christina MD   PCP: Tish Obregon MD     Medical Diagnosis: Pain in right elbow [M25.521]  Pain in left elbow [M25.522]  Dorsalgia, unspecified [M54.9]  Muscle spasm of back [M62.830]  Other chronic pain [G89.29] bilateral elbow joint pain (M25.521,M25.522), chronic back pain greater than 3 months duration (M54.9,G89.29), spasm of muscle of lower back (M62.830)  No data recorded    Insurance: Payor: Jerrica Shrestha / Plan: [employer] / Product Type: *No Product type* /   Insurance ID: [id number] - ([employer])    Subjective:   General  Chart Reviewed: Yes  Patient Assessed for Rehabilitation Services: Yes  Additional Pertinent Hx: 39year old male referred to PT with diagnoses of bilateral elbow joint pain and chronic low back pain > 3 months. Diagnosis: bilateral elbow joint pain (M25.521,M25.522), chronic back pain greater than 3 months duration (M54.9,G89.29), spasm of muscle of lower back (T77.753)  Referring Provider (secondary): Tish Obregon MD  Follows Commands: Within Functional Limits  PT Visit Information  Total # of Visits Approved: 12  Total # of Visits to Date: 1  Progress Note Due Date: 10/29/22  Subjective  Subjective: Patient states he is having back pain, fairly constant with intermittent muscle spasms. He reports the past few months he has had increasing back pain with some radicular pain into groin. He is an  an works for [employer] for about 4.5 years. He had worked construction as well as an . No specific mechanism of injury is recalled, right side of back appears more affected than the left. He describes his back as an ache, with intermittent spikes. In general, he is better when sitting, standing and walking increases pain.  He has tried several models of shoes to see if that would help with his pain, is up on his feet most of the day, \"at least 10,000 steps a day. \" He occassionaly is awakened by neck and back pain. Additionally, he is reporting bilateral elbow pain, akin to tennis elbow pain. His job as  requires a lot of work with his hands, a lot of gripping and twisting with hands. He has not been losing strength in his hands, holding a gallon of milk is sometimes difficult. Dominant Hand: : Right  Pain Screening  Patient Currently in Pain: Yes  Pain Assessment: 0-10  Pain Level: 3  Pain Location: Back, Elbow  Pain Orientation: Right  Pain Frequency: Intermittent  Aggravating factors: Walking       Vision/Hearing:  Vision  Vision: Within Functional Limits  Hearing  Hearing: Within functional limits    Orientation:  Orientation  Follows Commands: Within Functional Limits    Social History:  Social History  Type of Home: House    Functional Status:  Functional Status  Prior level of function: Independent  Occupation: Full time employment  Type of Occupation: full-time   Job Duties: Repetitive lifting; Awkward positions;Repetitive or prolonged grasping  Leisure & Hobbies: formerly did [employer]  ADL Assistance: Independent  Homemaking Assistance: Independent  Ambulation Assistance: Independent  Transfer Assistance: Independent  Active : Yes    Objective:   General Observations --   Description Patient sits leaning away from left side, comes to standing without difficulty. Stands with increased lumbar lordosis, left pelvic obliquity, elevated right hip, truncal shift to right. Increased reported pain at right side sacral area, increased discomfort with palpation of right PSIS, some increased lumbar paraspinal tone noted bilaterally.      Current Level of Function       Grooming Independent/No Functional Limitation   Bathing Independent/No Functional Limitation   Upper Body Dressing --   Lower Body Dressing --   Don/doff Socks/Shoes Independent/No bilateral wrist extensor strength, 6) decreased lumbar extension, 7) need for instruction in HEP. Therapy Prognosis: Good  Referring Provider (secondary): Jo Ann Hoff MD  Activity Tolerance  Activity Tolerance: Patient tolerated evaluation without incident  Activity Tolerance: Patient tolerated evaluation without incident         Plan:    Plan  Plan weeks: 4-6 weeks  Current Treatment Recommendations: Strengthening, ROM, Patient/Caregiver education & training, Home exercise program, Pain management, Modalities, Manual Therapy - Joint Manipulation, Manual Therapy - Soft Tissue Mobilization    Goals:  Short Term Goals  Time Frame for Short term goals: 3-4 weeks  Short term goal 1: Patient to be independent with HEP. Short term goal 2: Patient to report lessened frequency/intensity of lower back pain at work. Short term goal 3: Patient to report lessened difficulty pouring gallon of milk using right arm (decreased elbow pain). Long Term Goals  Time Frame for Long term goals : 4-6 weeks  Long term goal 1: Patient to have >= 25 degrees lumbar extension. Long term goal 2: Patient to have >= 90 degrees bilateral hamstring extensibility (lying supine, SLR). Long term goal 3: Partient to have >= 5/5 bilateral wrist extensor strength. Long term goal 4: Patient to score <= 10% impairment on the Oswestry Disability Questionnaire. Patient Goals   Patient goals : Decreased back and elbow pain.        Therapy Time:   Individual Concurrent Group Co-treatment   Time In 0858         Time Out 0948         Minutes 50         Timed Code Treatment Minutes: [address], PT

## 2022-10-04 ENCOUNTER — HOSPITAL ENCOUNTER (OUTPATIENT)
Dept: PHYSICAL THERAPY | Age: 37
Setting detail: THERAPIES SERIES
Discharge: HOME | End: 2022-10-04
Payer: COMMERCIAL

## 2022-10-04 PROCEDURE — 97110 THERAPEUTIC EXERCISES: CPT

## 2022-10-04 ASSESSMENT — PAIN DESCRIPTION - LOCATION: LOCATION: BACK

## 2022-10-04 ASSESSMENT — PAIN DESCRIPTION - FREQUENCY: FREQUENCY: INTERMITTENT

## 2022-10-04 ASSESSMENT — PAIN DESCRIPTION - DESCRIPTORS: DESCRIPTORS: SHARP

## 2022-10-04 ASSESSMENT — PAIN DESCRIPTION - ORIENTATION: ORIENTATION: MID;LOWER;RIGHT;LEFT

## 2022-10-04 NOTE — PROGRESS NOTES
Physical Therapy  Daily Treatment Note  Date: 10/4/2022  Patient Name: Nolvia Lima  MRN: [id number]     :   1985    Referring Physician: Denise Ritchie MD   PCP: Kari Navarrete MD    Medical Diagnosis: Pain in right elbow [M25.521]  Pain in left elbow [M25.522]  Dorsalgia, unspecified [M54.9]  Muscle spasm of back [M62.830]  Other chronic pain [G89.29] bilateral elbow joint pain (M25.521,M25.522), chronic back pain greater than 3 months duration (M54.9,G89.29), spasm of muscle of lower back (M62.830)  No data recorded    Insurance: Payor: Brady Whitney / Plan: [employer] [address] / Product Type: *No Product type* /   Insurance ID: [id number] - ([employer])    Subjective:   General  Chart Reviewed: Yes  Patient Assessed for Rehabilitation Services: Yes  Additional Pertinent Hx: 39year old male referred to PT with diagnoses of bilateral elbow joint pain and chronic low back pain > 3 months. Diagnosis: bilateral elbow joint pain (M25.521,M25.522), chronic back pain greater than 3 months duration (M54.9,G89.29), spasm of muscle of lower back (I23.744)  Referring Provider (secondary): Kari Navarrete MD  PT Visit Information  Total # of Visits Approved: 12  Total # of Visits to Date: 2  Progress Note Due Date: 10/29/22  Subjective  Subjective: Nothing new since here last.  But my back just started hurting. Just randomly increased in pain. Elbow is not hurting right now.   Dominant Hand: : Right  Pain Screening  Patient Currently in Pain: Yes  Pain Assessment: 0-10  Pain Location: Back  Pain Orientation: Mid, Lower, Right, Left  Pain Descriptors: Sharp  Pain Frequency: Intermittent       Treatment Activities:  Exercises:      Treatment Reasoning    Exercise 1: ++treatment of right sided LBP and bilateral lateral epicondylitis  Exercise 2: supine lower trunk rotation stretch to left--4 mins, 15 sec hold  Exercise 3: supine right quadratus stretch to left--4 mins, 15 sec hold  Exercise 4: isometric resisted right hip extension from SKTC--8 reps, 5\" hold  Exercise 5: isometric resisted left hip flexion at 90/90--8 reps, 5\" hold  Exercise 6: pelvic tilts with folded towel under sacrum--15 reps, 3-5\" hold  Exercise 7: right single leg bridge x 10  Exercise 8: Bilateral contract/relax hamstring stretch--4 reps, 10\" hold at end range  Exercise 9: prone on elbows (static stretch) --3 mins  Exercise 10: axial traction to bilateral LE's--4 reps, 15 sec hold  Exercise 11: bilateral hip abduction--15 reps bilaterally  Exercise 12: standing left hip hike x 10  Exercise 13: bilateral tennis elbow stretch, neutral and with ulnar deviation 3 x 10 sec each  Exercise 14: bilateral 2nd elbow stretch (shoulder abduction, then extension, pronation, wrist flexion with ulnar deviation)--3 reps, 10 sec hold  Exercise 15: cane roll ups-- light weight to start- 1# x 5 reps  Exercise 16: bilateral hand gripper, green--2 mins each  Exercise 17: bilateral green ball squeeze--2 mins each  Exercise 18: wrist flexion bilaterally, gradually increasing weight--2 x 15 reps, 4# to start                           Assessment:   Conditions Requiring Skilled Therapeutic Intervention  Assessment: Initiated ex per primary therapists POC. Patient did well with session. He did report increased pain in mid back prior to session. He relates he has mid back pain at times but that it is usuallyright low back pain. Patient reported back pain at 5/10 pre and post session for mid back. Low back no pain and bilateral elbows no pain post session        Goals:  Short Term Goals  Time Frame for Short Term Goals: 3-4 weeks  Short Term Goal 1: Patient to be independent with HEP. Short Term Goal 2: Patient to report lessened frequency/intensity of lower back pain at work. 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   Long Term Goals  Time Frame for Long Term Goals : 4-6 weeks  Long Term Goal 1: Patient to have >= 25 degrees lumbar extension. Long Term Goal 2: Patient to have >= 90 degrees bilateral hamstring extensibility (lying supine, SLR). Long Term Goal 3: Partient to have >= 5/5 bilateral wrist extensor strength. Long Term Goal 4: Patient to score <= 10% impairment on the Oswestry Disability Questionnaire. Patient Goals   Patient Goals : Decreased back and elbow pain.     Plan:    Physcial Therapy Plan  Plan weeks: 4-6 weeks  Current Treatment Recommendations: Strengthening, ROM, Patient/Caregiver education & training, Home exercise program, Pain management, Modalities, Manual Therapy - Joint Manipulation, Manual Therapy - Soft Tissue Mobilization  Timed Code Treatment Minutes: 50 Minutes     Therapy Time:   Individual Concurrent Group Co-treatment   Time In 1000         Time Out 1050         Minutes 50         Timed Code Treatment Minutes: DMITRI Spears   Electronically signed by Yanira Desai PTA on 10/4/2022 at 1:18 PM

## 2022-10-06 ENCOUNTER — HOSPITAL ENCOUNTER (OUTPATIENT)
Dept: PHYSICAL THERAPY | Age: 37
Setting detail: THERAPIES SERIES
Discharge: HOME | End: 2022-10-06
Payer: COMMERCIAL

## 2022-10-06 PROCEDURE — 97110 THERAPEUTIC EXERCISES: CPT

## 2022-10-06 ASSESSMENT — PAIN SCALES - GENERAL: PAINLEVEL_OUTOF10: 3

## 2022-10-06 ASSESSMENT — PAIN DESCRIPTION - PAIN TYPE: TYPE: CHRONIC PAIN

## 2022-10-06 ASSESSMENT — PAIN DESCRIPTION - ORIENTATION: ORIENTATION: RIGHT;LEFT;LOWER

## 2022-10-06 ASSESSMENT — PAIN DESCRIPTION - DESCRIPTORS: DESCRIPTORS: DULL

## 2022-10-06 ASSESSMENT — PAIN DESCRIPTION - LOCATION: LOCATION: BACK

## 2022-10-06 NOTE — PROGRESS NOTES
Physical Therapy  Daily Treatment Note  Date: 10/6/2022  Patient Name: Madhuri Stout  MRN: [id number]     :   1985    Subjective:      PT Visit Information  Total # of Visits Approved: 12  Total # of Visits to Date: 3  Progress Note Due Date: 10/29/22  Subjective: I did okay last time. I just had a spasm in my mid back coming in here.  My elbows only bother me when I flex    Pain Screening  Patient Currently in Pain: Yes  Pain Assessment: 0-10  Pain Level: 3  Pain Type: Chronic pain  Pain Location: Back  Pain Orientation: Right;Left;Lower  Pain Descriptors: Dull  Aggravating factors: Standing         Treatment Activities:   Exercises  Exercise 1: ++treatment of right sided LBP and bilateral lateral epicondylitis  Exercise 2: supine lower trunk rotation stretch to left--4 reps, 15 sec hold  Exercise 3: supine right quadratus stretch to left--4 reps, 15 sec hold  Exercise 4: isometric resisted right hip extension from SKTC--8 reps, 5\" hold  Exercise 5: isometric resisted left hip flexion at 90/90--8 reps, 5\" hold  Exercise 6: pelvic tilts with folded towel under sacrum--15 reps, 3-5\" hold  Exercise 7: right single leg bridge x 10  Exercise 8: Bilateral contract/relax hamstring stretch--4 reps, 10\" hold at end range  Exercise 9: prone on elbows (static stretch) --3 mins  Exercise 10: axial traction to bilateral LE's--4 reps, 15 sec hold  Exercise 11: bilateral hip abduction--15 reps bilaterally  Exercise 12: standing left hip hike x 10  Exercise 13: bilateral tennis elbow stretch, neutral and with ulnar deviation 4 x 10 sec each  Exercise 14: bilateral 2nd elbow stretch (shoulder abduction, then extension, pronation, wrist flexion with ulnar deviation)--4 reps, 10 sec hold  Exercise 15: cane roll ups-- light weight to start- 1# x 10 reps  Exercise 16: bilateral hand gripper, green--2 mins each  Exercise 17: bilateral green ball squeeze--2 mins each  Exercise 18: wrist flexion bilaterally, gradually increasing weight--2 x 15 reps, 4# to start     Assessment:   Conditions Requiring Skilled Therapeutic Intervention  Assessment: Patient tolerates increased reps this date of some exercises having no c/o increased pain but noted increased tightness in his R LB with supine exercises. He required some cues for technique to ensure correct mm engagement for max benefit. He reports some discomfort in her R arm post session. Will continue with current POC and progress as able. Goals:Short Term Goals  Time Frame for Short Term Goals: 3-4 weeks  Short Term Goal 1: Patient to be independent with HEP. Short Term Goal 2: Patient to report lessened frequency/intensity of lower back pain at work. 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 Long Term Goals  Time Frame for Long Term Goals : 4-6 weeks  Long Term Goal 1: Patient to have >= 25 degrees lumbar extension. Long Term Goal 2: Patient to have >= 90 degrees bilateral hamstring extensibility (lying supine, SLR). Long Term Goal 3: Partient to have >= 5/5 bilateral wrist extensor strength. Long Term Goal 4: Patient to score <= 10% impairment on the Oswestry Disability Questionnaire. Patient Goals   Patient Goals : Decreased back and elbow pain.       Plan:    Physcial Therapy Plan  Plan weeks: 4-6 weeks  Current Treatment Recommendations: Strengthening, ROM, Patient/Caregiver education & training, Home exercise program, Pain management, Modalities, Manual Therapy - Joint Manipulation, Manual Therapy - Soft Tissue Mobilization  Timed Code Treatment Minutes: 49 Minutes       Therapy Time   Individual Concurrent Group Co-treatment   Time In 1003         Time Out 0274         Minutes 49         Timed Code Treatment Minutes: 52 Minutes  Electronically signed by Lizbeth Vann PTA on 10/6/2022 at 1:17 PM

## 2022-10-10 ENCOUNTER — HOSPITAL ENCOUNTER (OUTPATIENT)
Dept: GENERAL RADIOLOGY | Age: 37
Discharge: HOME | End: 2022-10-10
Payer: COMMERCIAL

## 2022-10-10 DIAGNOSIS — D72.819 LEUKOPENIA, UNSPECIFIED TYPE: Primary | ICD-10-CM

## 2022-10-10 DIAGNOSIS — M89.9 DISORDER OF HYOID BONE: ICD-10-CM

## 2022-10-10 PROCEDURE — 72040 X-RAY EXAM NECK SPINE 2-3 VW: CPT

## 2022-10-10 NOTE — PROGRESS NOTES
Progress Note      Pt Name: Adebayo Trinh  YOB: 1985  MRN: [id number]    Date of evaluation: 10/11/2022  History Obtained From:  patient, electronic medical record    CHIEF COMPLAINT:    Chief Complaint   Patient presents with    Follow-up     Leukopenia, unspecified type     Current active problems  Leukopenia  Mild copper deficiency    HISTORY OF PRESENT ILLNESS:    Adebayo Trinh is a 39 y.o.  male followed in the office since 5/11/2020 for mild leukopenia. He did have mild copper deficiency as well and was given initially copper but then subsequently changed over to a multivitamin with copper. He reports that he did contract COVID-19 in June-mostly had just fatigue issues for about a week. He did lose his taste as well. He has fully recovered. He does have issues with seasonal allergies. He had his influenza vaccine a few weeks ago. He does have seasonal allergies continuing on Allegra. He takes melatonin at night to sleep. HEMATOLOGY HISTORY: Leukopenia, copper deficiency  Bella Moulton was seen in initial hematology consultation on 5/11/2020 referred by Dr. Jeanie Anthony for evaluation of a persistent mild leukopenia. Bella Moulton reports that he has known that he has had a low white count for some time. He denies any significant chronic or recurrent infections. He does have seasonal allergy issues and does have some pulmonary issues at times but has not had recurrent pneumonia issues. He denies any night sweats, weight loss, extreme fatigue, chronic pruritus. Prior to his initial visit he did have possibly a food allergy- some spice from a [place], that caused burning and itching across his shoulders and he is currently on prednisone. He denied any abdominal pain specifically in the left upper quadrant.      His initial CBC in the office on 5/11/2020 revealed that he has WBC was actually normal at 6.31 with a normal percent differential with neutrophils 57.8%, lymphocytes 33.3%, monocytes 6.8%, eosinophils 1.6% and basophil 0.5%. Hgb was normal at 15.9 with MCV 91.3 and PLT was 206,000. His CBC on his initial visit 5/11/2020 above revealed that his WBC was completely normal.  This could be affected from the steroid today we was on prior to that visit. Physical examination does not reveal any peripheral lymphadenopathy or splenomegaly. He does not have any B symptoms. Serology 5/11/2020  QI - IgG 973, IgA 144, IgM 102 - WNL   SPEP - no M spike with immunofixation negative  Free serum light chains - kappa 8.3, lambda 10.4, K/L ratio 0.8 - WNL  Copper - 70 ()  Zinc - 119  B12 - 455  Folate - 14.3  CMP - WNL  HIV 1/2 - nonreactive    He was started on copper 2 mg daily. His CBC at follow-up on 8/3/2020 revealed that his WBC had decreased to 3.83 with ANC 1.78. I discussed the fact that his [address] was adequate. Percentage differential was within normal limits. His Hgb and PLT continued to be completely normal.  I discussed potential need for bone marrow aspiration biopsy, reason for the procedure, risk, alternatives with him. After discussion we sent peripheral blood smear for evaluation to [facility] he desired to continue to be monitored conservatively. He did not have any recurrent infections. He does not have any splenomegaly, peripheral adenopathy on exam.    PERIPHERAL BLOOD SMEAR 8/3/2022 [facility]  Normocytic normochromic red blood cells with no significant anisopoikilocytosis  There is mild leukopenia. Neutrophils have normal morphology and appear to be adequate in number. Monocytes are not increased  Lymphocytes have heterogenous morphologic features. No left shift and no circulating blasts. Platelets appear to be adequate in number with occasional large forms. Serology 8/3/2020  Copper - 78 ()    Peripheral blood smear above was unrevealing for any ominous findings.     Past Medical History:   Diagnosis Date Anxiety     Bilateral low back pain without sciatica 9/14/2018    Chronic back pain     Chronic lung disease of prematurity     Depression with anxiety     Gynecomastia 2003    right side    Insomnia     Perennial allergic rhinitis 8/28/2017    Premature infant of 30 weeks gestation     Birth Weight 3lbs 11 oz        Past Surgical History:   Procedure Laterality Date    CYST REMOVAL      MASTECTOMY Right 2003    due to gynecomastia           Current Outpatient Medications:     Melatonin 5 MG CAPS, 5-10 mg at bedtime as needed for insomnia, Disp: , Rfl: 5    albuterol sulfate HFA (PROAIR HFA) 108 (90 Base) MCG/ACT inhaler, Inhale 2 puffs into the lungs every 4 hours as needed for Wheezing or Shortness of Breath, Disp: 2 Inhaler, Rfl: 3    Multiple Vitamins-Minerals (THERAPEUTIC MULTIVITAMIN-MINERALS) tablet, Take 1 tablet by mouth daily, Disp: , Rfl:     Fexofenadine HCl (ALLEGRA PO), Take by mouth, Disp: , Rfl:      Allergies   Allergen Reactions    Amoxicillin     Other Rash     STERI STRIPS    Pcn [Penicillins] Rash       Social History     Tobacco Use    Smoking status: Never    Smokeless tobacco: Never   Vaping Use    Vaping Use: Never used   Substance Use Topics    Alcohol use: Yes     Alcohol/week: 0.0 standard drinks     Comment: rarely    Drug use: No       Family History   Problem Relation Age of Onset    Heart Disease Paternal Grandfather     Prostate Cancer Father     Diabetes Daughter         Type I       Subjective   REVIEW OF SYSTEMS:   Review of Systems   Constitutional:  Negative for chills, diaphoresis, fatigue, fever and unexpected weight change. HENT:  Negative for mouth sores, nosebleeds, sore throat, trouble swallowing and voice change. Eyes:  Negative for photophobia, discharge and itching. Respiratory:  Negative for cough, shortness of breath and wheezing. Cardiovascular:  Negative for chest pain, palpitations and leg swelling.    Gastrointestinal:  Negative for abdominal distention, abdominal pain, blood in stool, constipation, diarrhea, nausea and vomiting. Endocrine: Negative for cold intolerance, heat intolerance, polydipsia and polyuria. Genitourinary:  Negative for difficulty urinating, dysuria, hematuria and urgency. Musculoskeletal:  Negative for arthralgias, back pain, joint swelling and myalgias. Skin:  Negative for color change and rash. Allergic/Immunologic: Positive for environmental allergies (Seasonal allergies) and food allergies ([place] food ). Neurological:  Negative for dizziness, tremors, seizures, syncope and light-headedness. Hematological:  Negative for adenopathy. Does not bruise/bleed easily. Psychiatric/Behavioral:  Negative for behavioral problems and suicidal ideas. The patient is not nervous/anxious. All other systems reviewed and are negative. Objective   /86   Pulse 97   Ht 5' 9\" (1.753 m)   Wt 206 lb 1.6 oz (93.5 kg)   SpO2 98%   BMI 30.44 kg/m²     PHYSICAL EXAM:  Physical Exam  Vitals reviewed. Constitutional:       General: He is not in acute distress. Appearance: He is well-developed. HENT:      Head: Normocephalic and atraumatic. Nose: Nose normal.   Eyes:      General: No scleral icterus. Conjunctiva/sclera: Conjunctivae normal.   Neck:      Vascular: No JVD. Trachea: No tracheal deviation. Cardiovascular:      Rate and Rhythm: Normal rate and regular rhythm. Heart sounds: Normal heart sounds. No murmur heard. Pulmonary:      Effort: Pulmonary effort is normal. No respiratory distress. Breath sounds: Normal breath sounds. No wheezing. Abdominal:      General: Bowel sounds are normal. There is no distension. Palpations: Abdomen is soft. There is no mass. Tenderness: There is no abdominal tenderness. Musculoskeletal:         General: No tenderness or deformity. Normal range of motion. Skin:     Findings: No erythema or rash.    Neurological:      Mental Status: He is alert and oriented to person, place, and time. Psychiatric:         Thought Content: Thought content normal.      CBC reviewed by me  Lab Results   Component Value Date    WBC 5.41 10/11/2022    HGB 16.6 10/11/2022    HCT 49.0 10/11/2022    MCV 89.1 10/11/2022     10/11/2022     Lab Results   Component Value Date    NEUTROABS 4.68 10/11/2022       VISIT DIAGNOSES  1. Leukopenia, unspecified type    2. Copper deficiency          WBC today is normal at 5.41. He has minimal elevation of neutrophils at 74.4%, lymphocyte 16.9%, monocyte 7.5%, eosinophil 0.6% basophil 0.6%. Hgb normal at 16.6, ,000. He appears to have responded well to his copper replacement, he will continue taking his multivitamin with copper. I am going to release him from the practice and see him as needed. Immunization History   Administered Date(s) Administered    COVID-19, Pfizer, PF, 30mcg/0.3mL 12/18/2020, 01/08/2021    Influenza, MDCK Quadv, IM, PF (Flucelvax 2 yrs and older) 09/21/2021            Return if symptoms worsen or fail to improve, for With Tello Oliveira.      lAyce Bravo PA-C  3:27 PM  10/11/2022

## 2022-10-11 ENCOUNTER — OFFICE VISIT (OUTPATIENT)
Dept: HEMATOLOGY | Age: 37
End: 2022-10-11
Payer: COMMERCIAL

## 2022-10-11 ENCOUNTER — HOSPITAL ENCOUNTER (OUTPATIENT)
Dept: INFUSION THERAPY | Age: 37
Discharge: HOME | End: 2022-10-11
Payer: COMMERCIAL

## 2022-10-11 ENCOUNTER — HOSPITAL ENCOUNTER (OUTPATIENT)
Dept: PHYSICAL THERAPY | Age: 37
Setting detail: THERAPIES SERIES
Discharge: HOME | End: 2022-10-11
Payer: COMMERCIAL

## 2022-10-11 VITALS
BODY MASS INDEX: 30.53 KG/M2 | SYSTOLIC BLOOD PRESSURE: 110 MMHG | HEART RATE: 97 BPM | HEIGHT: 69 IN | DIASTOLIC BLOOD PRESSURE: 86 MMHG | OXYGEN SATURATION: 98 % | WEIGHT: 206.1 LBS

## 2022-10-11 DIAGNOSIS — E61.0 COPPER DEFICIENCY: ICD-10-CM

## 2022-10-11 DIAGNOSIS — D72.819 LEUKOPENIA, UNSPECIFIED TYPE: ICD-10-CM

## 2022-10-11 DIAGNOSIS — D72.819 LEUKOPENIA, UNSPECIFIED TYPE: Primary | ICD-10-CM

## 2022-10-11 LAB
BASOPHILS ABSOLUTE: 0.04 K/UL (ref 0.01–0.08)
BASOPHILS RELATIVE PERCENT: 0.6 % (ref 0.1–1.2)
EOSINOPHILS ABSOLUTE: 0.04 K/UL (ref 0.04–0.54)
EOSINOPHILS RELATIVE PERCENT: 0.6 % (ref 0.7–7)
HCT VFR BLD CALC: 49 % (ref 40.1–51)
HEMOGLOBIN: 16.6 G/DL (ref 13.7–17.5)
LYMPHOCYTES ABSOLUTE: 1.06 K/UL (ref 1.18–3.74)
LYMPHOCYTES RELATIVE PERCENT: 16.9 % (ref 19.3–53.1)
MCH RBC QN AUTO: 30.2 PG (ref 25.7–32.2)
MCHC RBC AUTO-ENTMCNC: 33.9 G/DL (ref 32.3–36.5)
MCV RBC AUTO: 89.1 FL (ref 79–92.2)
MONOCYTES ABSOLUTE: 0.47 K/UL (ref 0.24–0.82)
MONOCYTES RELATIVE PERCENT: 7.5 % (ref 4.7–12.5)
NEUTROPHILS ABSOLUTE: 4.68 K/UL (ref 1.56–6.13)
NEUTROPHILS RELATIVE PERCENT: 74.4 % (ref 34–71.1)
PDW BLD-RTO: 13 % (ref 11.6–14.4)
PLATELET # BLD: 240 K/UL (ref 163–337)
PMV BLD AUTO: 9.9 FL (ref 7.4–10.4)
RBC # BLD: 5.5 M/UL (ref 4.63–6.08)
WBC # BLD: 5.41 K/UL (ref 4.23–9.07)

## 2022-10-11 PROCEDURE — 97110 THERAPEUTIC EXERCISES: CPT

## 2022-10-11 PROCEDURE — 36415 COLL VENOUS BLD VENIPUNCTURE: CPT

## 2022-10-11 PROCEDURE — 99211 OFF/OP EST MAY X REQ PHY/QHP: CPT

## 2022-10-11 PROCEDURE — 85025 COMPLETE CBC W/AUTO DIFF WBC: CPT

## 2022-10-11 PROCEDURE — 99213 OFFICE O/P EST LOW 20 MIN: CPT | Performed by: PHYSICIAN ASSISTANT

## 2022-10-11 ASSESSMENT — PAIN DESCRIPTION - LOCATION: LOCATION: BACK

## 2022-10-11 ASSESSMENT — ENCOUNTER SYMPTOMS
ABDOMINAL PAIN: 0
BACK PAIN: 0
COLOR CHANGE: 0
VOMITING: 0
CONSTIPATION: 0
WHEEZING: 0
PHOTOPHOBIA: 0
ABDOMINAL DISTENTION: 0
SHORTNESS OF BREATH: 0
DIARRHEA: 0
SORE THROAT: 0
EYE DISCHARGE: 0
COUGH: 0
NAUSEA: 0
EYE ITCHING: 0
BLOOD IN STOOL: 0
VOICE CHANGE: 0
TROUBLE SWALLOWING: 0

## 2022-10-11 ASSESSMENT — PAIN DESCRIPTION - PAIN TYPE: TYPE: CHRONIC PAIN

## 2022-10-11 ASSESSMENT — PAIN SCALES - GENERAL: PAINLEVEL_OUTOF10: 3

## 2022-10-11 NOTE — PROGRESS NOTES
Physical Therapy  Daily Treatment Note  Date: 10/11/2022  Patient Name: Collins Carrillo  MRN: [id number]     :   1985    Referring Physician: Al Mendez MD   PCP: Dwayne Schwarz MD    Medical Diagnosis: Pain in right elbow [M25.521]  Pain in left elbow [M25.522]  Dorsalgia, unspecified [M54.9]  Muscle spasm of back [M62.830]  Other chronic pain [G89.29] bilateral elbow joint pain (M25.521,M25.522), chronic back pain greater than 3 months duration (M54.9,G89.29), spasm of muscle of lower back (M62.830)  No data recorded    Insurance: Payor: Shruti Liz / Plan: [employer] [address] / Product Type: *No Product type* /   Insurance ID: [id number] - ([employer])    Subjective:   General  Diagnosis: bilateral elbow joint pain (M25.521,M25.522), chronic back pain greater than 3 months duration (M54.9,G89.29), spasm of muscle of lower back (M62.830)  Referring Provider (secondary): Dwayne Schwarz MD  Total # of Visits Approved: 12  Total # of Visits to Date: 4  Progress Note Due Date: 10/29/22  Subjective: i still have some tightness on the R side of my back but a little better  Patient Currently in Pain: Yes  Pain Level: 3  Pain Type: Chronic pain  Pain Location: Back       Treatment Activities:  Exercises:      Treatment Reasoning    Exercise 1: ++treatment of right sided LBP and bilateral lateral epicondylitis  Exercise 2: supine lower trunk rotation stretch to left--4 reps, 15 sec hold  Exercise 3: supine right quadratus stretch to left--4 reps, 15 sec hold  Exercise 4: isometric resisted right hip extension from SKTC--8 reps, 5\" hold  Exercise 5: isometric resisted left hip flexion at 90/90--8 reps, 5\" hold  Exercise 6: pelvic tilts with folded towel under sacrum--15 reps, 3-5\" hold  Exercise 7: right single leg bridge x 10  Exercise 8: Bilateral contract/relax hamstring stretch--4 reps, 10\" hold at end range  Exercise 9: axial traction to bilateral LE's--4 reps, 15 sec hold  Exercise 10: prone on elbows (static stretch) --3 mins  Exercise 11: bilateral hip abduction--15 reps bilaterally  Exercise 12: standing left hip hike x 10  Exercise 13: bilateral tennis elbow stretch, neutral and with ulnar deviation 4 x 10 sec each  Exercise 14: bilateral 2nd elbow stretch (shoulder abduction, then extension, pronation, wrist flexion with ulnar deviation)--4 reps, 10 sec hold  Exercise 15: cane roll ups-- light weight to start- 1# x 10 reps  Exercise 16: bilateral hand gripper, green--2 mins each  Exercise 17: bilateral green ball squeeze--2 mins each  Exercise 18: wrist flexion bilaterally, gradually increasing weight--2 x 15 reps, 4# to start  Exercise 19: *HEP IN CHART                           Assessment:   Conditions Requiring Skilled Therapeutic Intervention  Assessment: patient with good tolerance for exercise routine cues for proper leg placement with quadratus stretch, will create a formal HEP and have it available for his next therapy session. Requires PT Follow-Up: Yes        Goals:  Short Term Goals  Time Frame for Short Term Goals: 3-4 weeks  Short Term Goal 1: Patient to be independent with HEP. Short Term Goal 2: Patient to report lessened frequency/intensity of lower back pain at work. 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 Long Term Goals  Time Frame for Long Term Goals : 4-6 weeks  Long Term Goal 1: Patient to have >= 25 degrees lumbar extension. Long Term Goal 2: Patient to have >= 90 degrees bilateral hamstring extensibility (lying supine, SLR). Long Term Goal 3: Partient to have >= 5/5 bilateral wrist extensor strength. Long Term Goal 4: Patient to score <= 10% impairment on the Oswestry Disability Questionnaire. Patient Goals   Patient Goals : Decreased back and elbow pain.     Plan:    Physcial Therapy Plan  Plan weeks: 4-6 weeks  Current Treatment Recommendations: Strengthening, ROM, Patient/Caregiver education & training, Home exercise program, Pain management, Modalities, Manual Therapy - Joint Manipulation, Manual Therapy - Soft Tissue Mobilization        Therapy Time:   Individual Concurrent Group Co-treatment   Time In 6217         Time Out 0935         Minutes [address], PTA   Electronically signed by Brittany Burleson PTA on 10/11/2022 at 10:34 AM

## 2022-10-12 DIAGNOSIS — M89.9 DISORDER OF HYOID BONE: Primary | ICD-10-CM

## 2022-10-12 DIAGNOSIS — M54.2 ANTERIOR NECK PAIN: ICD-10-CM

## 2022-10-13 ENCOUNTER — HOSPITAL ENCOUNTER (OUTPATIENT)
Dept: PHYSICAL THERAPY | Age: 37
Setting detail: THERAPIES SERIES
Discharge: HOME | End: 2022-10-13
Payer: COMMERCIAL

## 2022-10-13 PROCEDURE — 97110 THERAPEUTIC EXERCISES: CPT

## 2022-10-13 ASSESSMENT — PAIN DESCRIPTION - PAIN TYPE: TYPE: CHRONIC PAIN

## 2022-10-13 ASSESSMENT — PAIN DESCRIPTION - DESCRIPTORS: DESCRIPTORS: DULL

## 2022-10-13 ASSESSMENT — PAIN SCALES - GENERAL: PAINLEVEL_OUTOF10: 1

## 2022-10-13 ASSESSMENT — PAIN DESCRIPTION - ORIENTATION: ORIENTATION: RIGHT;LEFT;LOWER

## 2022-10-13 ASSESSMENT — PAIN DESCRIPTION - LOCATION: LOCATION: BACK

## 2022-10-17 ENCOUNTER — OFFICE VISIT (OUTPATIENT)
Dept: UROLOGY | Age: 37
End: 2022-10-17
Payer: COMMERCIAL

## 2022-10-17 VITALS — WEIGHT: 208.2 LBS | HEIGHT: 69 IN | BODY MASS INDEX: 30.84 KG/M2 | TEMPERATURE: 98.1 F

## 2022-10-17 DIAGNOSIS — N13.8 BPH WITH OBSTRUCTION/LOWER URINARY TRACT SYMPTOMS: ICD-10-CM

## 2022-10-17 DIAGNOSIS — I86.1 LEFT VARICOCELE: ICD-10-CM

## 2022-10-17 DIAGNOSIS — N50.3 CYST OF EPIDIDYMIS: ICD-10-CM

## 2022-10-17 DIAGNOSIS — N50.812 PAIN IN BOTH TESTICLES: Primary | ICD-10-CM

## 2022-10-17 DIAGNOSIS — N40.1 BPH WITH OBSTRUCTION/LOWER URINARY TRACT SYMPTOMS: ICD-10-CM

## 2022-10-17 DIAGNOSIS — N50.811 PAIN IN BOTH TESTICLES: Primary | ICD-10-CM

## 2022-10-17 LAB
APPEARANCE FLUID: CLEAR
BILIRUBIN, POC: NORMAL
BLOOD URINE, POC: NORMAL
CLARITY, POC: CLEAR
COLOR, POC: YELLOW
GLUCOSE URINE, POC: NORMAL
KETONES, POC: NORMAL
LEUKOCYTE EST, POC: NORMAL
NITRITE, POC: NORMAL
PH, POC: 7
PROTEIN, POC: NORMAL
SPECIFIC GRAVITY, POC: 1.01
UROBILINOGEN, POC: 0.2

## 2022-10-17 PROCEDURE — 81002 URINALYSIS NONAUTO W/O SCOPE: CPT | Performed by: NURSE PRACTITIONER

## 2022-10-17 PROCEDURE — 99204 OFFICE O/P NEW MOD 45 MIN: CPT | Performed by: NURSE PRACTITIONER

## 2022-10-17 RX ORDER — CYCLOBENZAPRINE HCL 5 MG
5 TABLET ORAL 2 TIMES DAILY PRN
Qty: 30 TABLET | Refills: 0 | Status: SHIPPED | OUTPATIENT
Start: 2022-10-17 | End: 2022-10-27

## 2022-10-17 RX ORDER — INDOMETHACIN 50 MG/1
50 CAPSULE ORAL 3 TIMES DAILY
Qty: 180 CAPSULE | Refills: 1 | Status: SHIPPED | OUTPATIENT
Start: 2022-10-17

## 2022-10-17 RX ORDER — TAMSULOSIN HYDROCHLORIDE 0.4 MG/1
0.4 CAPSULE ORAL NIGHTLY
Qty: 30 CAPSULE | Refills: 0 | Status: SHIPPED | OUTPATIENT
Start: 2022-10-17

## 2022-10-17 ASSESSMENT — ENCOUNTER SYMPTOMS
NAUSEA: 0
ABDOMINAL DISTENTION: 0
ABDOMINAL PAIN: 0
BACK PAIN: 0
VOMITING: 0

## 2022-10-17 NOTE — PROGRESS NOTES
Vick Polanco is a 39 y.o., male, New patient who presents today   Chief Complaint   Patient presents with    New Patient     I am here today for a varicocele and bilat testicular pain       HPI   Patient presents for evaluation of bilateral testicular pain. He reports the pain is worse on the right than the left. Typically the pain is just a dull ache, but he reports the pain typically worsens with muscle spasms from his back. He also reports that certain sexual positions worsen his pain. He denies any trauma to the area or any prior procedures. He denies any history of urinary tract infections, nephrolithiasis, STDs, familial history of bladder or kidney cancer. He reports his father has a history of prostate cancer, but does have heavy tobacco use in his history. He also has some chronic lower urinary tract symptoms including frequency without urgency, hesitancy, intermittency, nocturia x1. He feels as if he empties his bladder completely. REVIEW OF SYSTEMS:  Review of Systems   Constitutional:  Negative for chills and fever. Gastrointestinal:  Negative for abdominal distention, abdominal pain, nausea and vomiting. Genitourinary:  Positive for difficulty urinating, frequency and testicular pain. Negative for dysuria, flank pain, hematuria, scrotal swelling and urgency. Musculoskeletal:  Negative for back pain and gait problem. Psychiatric/Behavioral:  Negative for agitation and confusion. PHYSICAL EXAM:  Temp 98.1 °F (36.7 °C) (Temporal)   Ht 5' 9\" (1.753 m)   Wt 208 lb 3.2 oz (94.4 kg)   BMI 30.75 kg/m²   Physical Exam  Vitals and nursing note reviewed. Constitutional:       General: He is not in acute distress. Appearance: Normal appearance. He is not ill-appearing. Pulmonary:      Effort: Pulmonary effort is normal. No respiratory distress. Abdominal:      General: There is no distension. Tenderness: There is no abdominal tenderness.  There is no right CVA tenderness or left CVA tenderness. Hernia: There is no hernia in the left inguinal area or right inguinal area. Genitourinary:     Testes:         Right: Tenderness present. Mass, swelling, testicular hydrocele or varicocele not present. Left: Tenderness and varicocele present. Mass, swelling or testicular hydrocele not present. Epididymis:      Right: Enlarged. Tenderness present. No mass. Left: Enlarged. Tenderness present. No mass. Prostate: Enlarged (35-40g). Not tender and no nodules present. Neurological:      Mental Status: He is alert and oriented to person, place, and time. Mental status is at baseline. Psychiatric:         Mood and Affect: Mood normal.         Behavior: Behavior normal.       DATA:  Results for orders placed or performed in visit on 10/17/22   POCT Urinalysis no Micro   Result Value Ref Range    Color, UA YELLOW     Clarity, UA CLEAR     Glucose, UA POC NEG     Bilirubin, UA NEG     Ketones, UA NEG     Spec Grav, UA 1.010     Blood, UA POC NEG     pH, UA 7     Protein, UA POC NEG     Urobilinogen, UA 0.2     Leukocytes, UA NEG     Nitrite, UA NEG     Appearance, Fluid Clear Clear, Slightly Cloudy       IMAGING:  Impression   Punctate right testicle   2 small left epididymal cysts. Study otherwise unremarkable study otherwise unremarkable   Recommendation:   Follow up as clinically indicated. Electronically Signed by Maryuri Price MD at 26-Aug-2022 02:39:23 PM      Scrotal ultrasound reveals left-sided varicocele and bilateral epididymal head cysts. ASSESSMENT/PLAN  1. Pain in both testicles  Patient experienced bilateral testicular pain with the right being worse than the left. He does have bilateral epididymal head cysts verified by physical exam as well as imaging. We did discuss the possibility of these possibly contributing to his pain. He also has a finding of a left-sided varicocele.   We discussed the risk and benefits for testicular surgery and I explained that I did not feel it would be beneficial for him at this point in time to undergo a surgical procedure as I believe he would be more inclined to experience chronic testicular pain rather than alleviating his current pain. We will initiate anti-inflammatory therapy daily for at least 2 weeks and then as needed after. I have also given him a muscle relaxer short-term as he reports the muscle spasms in his back exacerbate his testicular pain. We also discussed supportive care such as tight fitting underwear, ice, elevation. Patient voices understanding. We will follow-up in about a month to see how he is doing in terms of symptoms.  - POCT Urinalysis no Micro  - indomethacin (INDOCIN) 50 MG capsule; Take 1 capsule by mouth 3 times daily  Dispense: 180 capsule; Refill: 1  - cyclobenzaprine (FLEXERIL) 5 MG tablet; Take 1 tablet by mouth 2 times daily as needed for Muscle spasms  Dispense: 30 tablet; Refill: 0    2. Left varicocele  Discussed possible referral to vascular surgery for cool embolization. His pain is really more on the right than the left, so I do not believe he would benefit at this time from procedure. Patient voices understanding. 3. Cyst of epididymis  See above. Bilateral cysts, however, will not pursue intervention at this time. 4. BPH with obstruction/lower urinary tract symptoms  Patient with lower urinary tract symptoms. He is somewhat bothered by these and would like to initiate alpha-blocker therapy to see if this can provide some relief. We will follow-up in about a month with bladder scan and symptom check. - tamsulosin (FLOMAX) 0.4 MG capsule; Take 1 capsule by mouth at bedtime  Dispense: 30 capsule; Refill: 0      Orders Placed This Encounter   Procedures    POCT Urinalysis no Micro        Return in about 4 weeks (around 11/14/2022). An electronic signature was used to authenticate this note.     KELLY QUINN, APRN - CNP    All information inputted into the note by the MA to include chief complaint, past medical history, past surgical history, medications, allergies, social and family history and review of systems has been reviewed and updated as needed by me. EMR Dragon/transcription disclaimer: Much of this document is electronic transcription/translation of spoken language to printed text. The electronic translation of spoken language may be erroneous or, at times, nonsensical words or phrases may be inadvertently transcribed.  Although I have reviewed the document for such errors, some may still exist.

## 2022-10-18 ENCOUNTER — HOSPITAL ENCOUNTER (OUTPATIENT)
Dept: PHYSICAL THERAPY | Age: 37
Setting detail: THERAPIES SERIES
Discharge: HOME | End: 2022-10-18
Payer: COMMERCIAL

## 2022-10-18 PROCEDURE — 97110 THERAPEUTIC EXERCISES: CPT

## 2022-10-18 ASSESSMENT — PAIN DESCRIPTION - PAIN TYPE: TYPE: CHRONIC PAIN

## 2022-10-18 ASSESSMENT — PAIN DESCRIPTION - LOCATION: LOCATION: BACK

## 2022-10-18 ASSESSMENT — PAIN SCALES - GENERAL: PAINLEVEL_OUTOF10: 1

## 2022-10-18 NOTE — PROGRESS NOTES
Physical Therapy  Daily Treatment Note  Date: 10/18/2022  Patient Name: Beronica Besaley  MRN: [id number]     :   1985    Referring Physician: Norma Kirkpatrick MD   PCP: Florencio Newman MD    Medical Diagnosis: Pain in right elbow [M25.521]  Pain in left elbow [M25.522]  Dorsalgia, unspecified [M54.9]  Muscle spasm of back [M62.830]  Other chronic pain [G89.29] bilateral elbow joint pain (M25.521,M25.522), chronic back pain greater than 3 months duration (M54.9,G89.29), spasm of muscle of lower back (M62.830)  No data recorded    Insurance: Payor: Colleen Malcom / Plan: [employer] [address] / Product Type: *No Product type* /   Insurance ID: [id number] - ([employer])    Subjective:   General  Diagnosis: bilateral elbow joint pain (M25.521,M25.522), chronic back pain greater than 3 months duration (M54.9,G89.29), spasm of muscle of lower back (M62.830)  Referring Provider (secondary): Florencio Newman MD  Total # of Visits Approved: 12  Total # of Visits to Date: 6  Progress Note Due Date: 10/29/22  Subjective: pain is about the same, low  Patient Currently in Pain: Yes  Pain Level: 1  Pain Type: Chronic pain  Pain Location: Back       Treatment Activities:  Exercises:      Treatment Reasoning    Exercise 1: ++treatment of right sided LBP and bilateral lateral epicondylitis  Exercise 2: supine lower trunk rotation stretch to left--4 reps, 15 sec hold  Exercise 3: supine right quadratus stretch to left--4 reps, 15 sec hold  Exercise 4: isometric resisted right hip extension from SKTC--8 reps, 5\" hold  Exercise 5: isometric resisted left hip flexion at 90/90--8 reps, 5\" hold  Exercise 6: pelvic tilts with folded towel under sacrum--15 reps, 3-5\" hold  Exercise 7: right single leg bridge x 15  Exercise 8: Bilateral contract/relax hamstring stretch--4 reps, 10\" hold at end range  Exercise 9: axial traction to bilateral LE's--4 reps, 15 sec hold  Exercise 10: prone on elbows (static stretch) --3 mins  Exercise 11: bilateral hip abduction--15 reps bilaterally  Exercise 12: standing left hip hike x 10  Exercise 13: bilateral tennis elbow stretch, neutral and with ulnar deviation 4 x 10 sec each  Exercise 14: bilateral 2nd elbow stretch (shoulder abduction, then extension, pronation, wrist flexion with ulnar deviation)--4 reps, 10 sec hold  Exercise 15: cane roll ups-- light weight to start- 1.5# x 10 reps  Exercise 16: bilateral hand gripper, green--2 mins each  Exercise 17: bilateral green ball squeeze--2 mins each  Exercise 18: wrist flexion bilaterally, gradually increasing weight- 2 x15 reps, 5#  Exercise 19: HEP given 10/13/22                           Assessment:   Conditions Requiring Skilled Therapeutic Intervention  Assessment: Patient with significant improvement of decreased pain and symptoms in bilateral forearms and increased wt with cane roll ups and decreased repetitions. No complaints after therapy session. Requires PT Follow-Up: Yes        Goals:  Short Term Goals  Time Frame for Short Term Goals: 3-4 weeks  Short Term Goal 1: Patient to be independent with HEP. Short Term Goal 2: Patient to report lessened frequency/intensity of lower back pain at work. 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 Long Term Goals  Time Frame for Long Term Goals : 4-6 weeks  Long Term Goal 1: Patient to have >= 25 degrees lumbar extension. Long Term Goal 2: Patient to have >= 90 degrees bilateral hamstring extensibility (lying supine, SLR). Long Term Goal 3: Partient to have >= 5/5 bilateral wrist extensor strength. Long Term Goal 4: Patient to score <= 10% impairment on the Oswestry Disability Questionnaire. Patient Goals   Patient Goals : Decreased back and elbow pain.     Plan:    Physcial Therapy Plan  Plan weeks: 4-6 weeks  Current Treatment Recommendations: Strengthening, ROM, Patient/Caregiver education & training, Home exercise program, Pain management, Modalities, Manual Therapy - Joint Manipulation, Manual Therapy - Soft Tissue Mobilization        Therapy Time:   Individual Concurrent Group Co-treatment   Time In 0901         Time Out 0942         Minutes 39            Kassie Simons [place]   Electronically signed by Kassie Simons PTA on 10/18/2022 at 9:46 AM

## 2022-10-20 ENCOUNTER — HOSPITAL ENCOUNTER (OUTPATIENT)
Dept: PHYSICAL THERAPY | Age: 37
Setting detail: THERAPIES SERIES
Discharge: HOME | End: 2022-10-20
Payer: COMMERCIAL

## 2022-10-20 PROCEDURE — 97110 THERAPEUTIC EXERCISES: CPT

## 2022-10-20 ASSESSMENT — PAIN DESCRIPTION - DESCRIPTORS: DESCRIPTORS: DULL;TIGHTNESS

## 2022-10-20 ASSESSMENT — PAIN SCALES - GENERAL: PAINLEVEL_OUTOF10: 4

## 2022-10-20 ASSESSMENT — PAIN DESCRIPTION - LOCATION: LOCATION: BACK

## 2022-10-20 ASSESSMENT — PAIN DESCRIPTION - ORIENTATION: ORIENTATION: RIGHT;UPPER

## 2022-10-20 ASSESSMENT — PAIN DESCRIPTION - PAIN TYPE: TYPE: ACUTE PAIN;CHRONIC PAIN

## 2022-10-20 NOTE — PROGRESS NOTES
Physical Therapy  Daily Treatment Note  Date: 10/20/2022  Patient Name: Alexis Melendez  MRN: [id number]     :   1985    Subjective:      PT Visit Information  Total # of Visits Approved: 12  Total # of Visits to Date: 7  Progress Note Due Date: 10/29/22  Subjective: I had a mm spasm yesterday when i got out of my car for work. It under my R shoulder blade.     Pain Screening  Patient Currently in Pain: Yes  Pain Assessment: 0-10  Pain Level: 4  Pain Type: Acute pain;Chronic pain  Pain Location: Back  Pain Orientation: Right;Upper  Pain Descriptors: Dull;Tightness         Treatment Activities:   Exercises  Exercise 1: ++treatment of right sided LBP and bilateral lateral epicondylitis  Exercise 2: supine lower trunk rotation stretch to left--4 reps, 15 sec hold  Exercise 3: supine right quadratus stretch to left--4 reps, 15 sec hold  Exercise 4: isometric resisted right hip extension from SKTC--8 reps, 5\" hold  Exercise 5: isometric resisted left hip flexion at 90/90--8 reps, 5\" hold  Exercise 6: pelvic tilts with folded towel under sacrum--15 reps, 3-5\" hold  Exercise 7: right single leg bridge x 15  Exercise 8: Bilateral contract/relax hamstring stretch--4 reps, 10\" hold at end range  Exercise 9: axial traction to bilateral LE's--4 reps, 15 sec hold  Exercise 10: prone on elbows (static stretch) --3 mins  Exercise 11: bilateral hip abduction--15 reps bilaterally with 2# weight MARIELA  Exercise 12: standing left hip hike x 10  Exercise 13: bilateral tennis elbow stretch, neutral and with ulnar deviation 4 x 10 sec each  Exercise 14: bilateral 2nd elbow stretch (shoulder abduction, then extension, pronation, wrist flexion with ulnar deviation)--4 reps, 10 sec hold  Exercise 15: cane roll ups-- light weight to start- 1.5# x 10 reps  Exercise 16: bilateral hand gripper, blue--2 mins each  Exercise 17: bilateral blue ball squeeze--2 mins each  Exercise 18: wrist flexion bilaterally, gradually increasing weight- 2 x15 reps, 5#  Exercise 19: HEP given 10/13/22       Assessment:   Conditions Requiring Skilled Therapeutic Intervention  Assessment: Patient tolerates increased resistance well this date having no c/o increased discomfort. He is able to complete UE exercises with no c/o increased pain in his elbows. Will continue to progress patient as he tolerates. Performs exercises with min cues for technique. Goals:Short Term Goals  Time Frame for Short Term Goals: 3-4 weeks  Short Term Goal 1: Patient to be independent with HEP. Short Term Goal 2: Patient to report lessened frequency/intensity of lower back pain at work. 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 Long Term Goals  Time Frame for Long Term Goals : 4-6 weeks  Long Term Goal 1: Patient to have >= 25 degrees lumbar extension. Long Term Goal 2: Patient to have >= 90 degrees bilateral hamstring extensibility (lying supine, SLR). Long Term Goal 3: Partient to have >= 5/5 bilateral wrist extensor strength. Long Term Goal 4: Patient to score <= 10% impairment on the Oswestry Disability Questionnaire. Patient Goals   Patient Goals : Decreased back and elbow pain.     Plan:    Physcial Therapy Plan  Plan weeks: 4-6 weeks  Current Treatment Recommendations: Strengthening, ROM, Patient/Caregiver education & training, Home exercise program, Pain management, Modalities, Manual Therapy - Joint Manipulation, Manual Therapy - Soft Tissue Mobilization  Timed Code Treatment Minutes: 42 Minutes     Therapy Time   Individual Concurrent Group Co-treatment   Time In 2686         Time Out 0940         Minutes 42         Timed Code Treatment Minutes: 42 Minutes    Electronically signed by Tatyana Wu PTA on 10/20/2022 at 9:43 AM

## 2022-10-21 PROBLEM — M89.9: Status: ACTIVE | Noted: 2022-10-21

## 2022-10-25 ENCOUNTER — HOSPITAL ENCOUNTER (OUTPATIENT)
Dept: PHYSICAL THERAPY | Age: 37
Setting detail: THERAPIES SERIES
Discharge: HOME | End: 2022-10-25
Payer: COMMERCIAL

## 2022-10-25 PROCEDURE — 97110 THERAPEUTIC EXERCISES: CPT

## 2022-10-25 ASSESSMENT — PAIN DESCRIPTION - ORIENTATION: ORIENTATION: LOWER

## 2022-10-25 ASSESSMENT — PAIN DESCRIPTION - PAIN TYPE: TYPE: CHRONIC PAIN

## 2022-10-25 ASSESSMENT — PAIN SCALES - GENERAL: PAINLEVEL_OUTOF10: 1

## 2022-10-25 ASSESSMENT — PAIN DESCRIPTION - LOCATION: LOCATION: BACK

## 2022-10-25 NOTE — PROGRESS NOTES
Physical Therapy  Daily Treatment Note  Date: 10/25/2022  Patient Name: Sridevi Hill  MRN: [id number]     :   1985    Referring Physician: Justin Dixon MD   PCP: Paz Rosas MD    Medical Diagnosis: Pain in right elbow [M25.521]  Pain in left elbow [M25.522]  Dorsalgia, unspecified [M54.9]  Muscle spasm of back [M62.830]  Other chronic pain [G89.29] bilateral elbow joint pain (M25.521,M25.522), chronic back pain greater than 3 months duration (M54.9,G89.29), spasm of muscle of lower back (M62.830)  No data recorded    Insurance: Payor: Hiram Lewis / Plan: [employer] [address] / Product Type: *No Product type* /   Insurance ID: [id number] - ([employer])    Subjective:   General  Diagnosis: bilateral elbow joint pain (M25.521,M25.522), chronic back pain greater than 3 months duration (M54.9,G89.29), spasm of muscle of lower back (M62.830)  Referring Provider (secondary): Paz Rosas MD  Total # of Visits Approved: 12  Total # of Visits to Date: 8  Progress Note Due Date: 10/29/22  Subjective: feeling pretty good, pain is about a 1/10  Patient Currently in Pain: Yes  Pain Level: 1  Pain Type: Chronic pain  Pain Location: Back  Pain Orientation: Lower       Treatment Activities:  Exercises:      Treatment Reasoning    Exercise 1: ++treatment of right sided LBP and bilateral lateral epicondylitis  Exercise 2: supine lower trunk rotation stretch to left--4 reps, 15 sec hold  Exercise 3: supine right quadratus stretch to left--4 reps, 15 sec hold  Exercise 4: isometric resisted right hip extension from SKTC--8 reps, 5\" hold  Exercise 5: isometric resisted left hip flexion at 90/90--8 reps, 5\" hold  Exercise 6: pelvic tilts with folded towel under sacrum--15 reps, 3-5\" hold  Exercise 7: right single leg bridge x 15  Exercise 8: Bilateral contract/relax hamstring stretch--4 reps, 10\" hold at end range  Exercise 9: axial traction to bilateral LE's--4 reps, 15 sec hold  Exercise 10: prone on elbows (static stretch) --3 mins  Exercise 11: bilateral hip abduction--15 reps bilaterally with 2# weight MARIELA  Exercise 12: standing left hip hike x 10  Exercise 13: bilateral tennis elbow stretch, neutral and with ulnar deviation 4 x 10 sec each  Exercise 14: bilateral 2nd elbow stretch (shoulder abduction, then extension, pronation, wrist flexion with ulnar deviation)--4 reps, 10 sec hold  Exercise 15: cane roll ups-- light weight to start- 1.5# x 10 reps  Exercise 16: bilateral hand gripper, blue--2 mins each  Exercise 17: bilateral blue ball squeeze--2 mins each  Exercise 18: wrist flexion bilaterally, gradually increasing weight- 2 x15 reps, 5#  Exercise 19: HEP given 10/13/22                           Assessment:   Conditions Requiring Skilled Therapeutic Intervention  Assessment: Patient has a good understanding of his exercises and stretches and has made considerable improvement in his ROM as well as decreased pain, reassessment due on his next visit. Requires PT Follow-Up: Yes        Goals:  Short Term Goals  Time Frame for Short Term Goals: 3-4 weeks  Short Term Goal 1: Patient to be independent with HEP. Short Term Goal 2: Patient to report lessened frequency/intensity of lower back pain at work. 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 Long Term Goals  Time Frame for Long Term Goals : 4-6 weeks  Long Term Goal 1: Patient to have >= 25 degrees lumbar extension. Long Term Goal 2: Patient to have >= 90 degrees bilateral hamstring extensibility (lying supine, SLR). Long Term Goal 3: Partient to have >= 5/5 bilateral wrist extensor strength. Long Term Goal 4: Patient to score <= 10% impairment on the Oswestry Disability Questionnaire. Patient Goals   Patient Goals : Decreased back and elbow pain.     Plan:    Physcial Therapy Plan  Plan weeks: 4-6 weeks  Current Treatment Recommendations: Strengthening, ROM, Patient/Caregiver education & training, Home exercise program, Pain management, Modalities, Manual Therapy - Joint Manipulation, Manual Therapy - Soft Tissue Mobilization        Therapy Time:   Individual Concurrent Group Co-treatment   Time In 0858         Time Out 0936         Minutes 45              Mayo Cm PTA   Electronically signed by Mayo Cm PTA on 10/25/2022 at 9:46 AM

## 2022-10-27 ENCOUNTER — HOSPITAL ENCOUNTER (OUTPATIENT)
Dept: PHYSICAL THERAPY | Age: 37
Setting detail: THERAPIES SERIES
Discharge: HOME | End: 2022-10-27
Payer: COMMERCIAL

## 2022-10-27 PROCEDURE — 97110 THERAPEUTIC EXERCISES: CPT

## 2022-10-27 ASSESSMENT — PAIN SCALES - GENERAL: PAINLEVEL_OUTOF10: 0

## 2022-10-27 NOTE — PROGRESS NOTES
Physical Therapy  Daily Treatment Note/Reassessment/Discharge Note  Date: 10/27/2022  Patient Name: Nish New  MRN: [id number]     :   1985    Referring Physician: Pepito Hitchcock MD   PCP: Jax Kaufman MD    Medical Diagnosis: Pain in right elbow [M25.521]  Pain in left elbow [M25.522]  Dorsalgia, unspecified [M54.9]  Muscle spasm of back [M62.830]  Other chronic pain [G89.29] bilateral elbow joint pain (M25.521,M25.522), chronic back pain greater than 3 months duration (M54.9,G89.29), spasm of muscle of lower back (M62.830)  No data recorded    Insurance: Payor: Heron Kocher / Plan: [employer] [address] / Product Type: *No Product type* /   Insurance ID: [id number] - ([email or web address])    Subjective:   General  Additional Pertinent Hx: 39year old male referred to PT with diagnoses of bilateral elbow joint pain and chronic low back pain > 3 months. Diagnosis: bilateral elbow joint pain (M25.521,M25.522), chronic back pain greater than 3 months duration (M54.9,G89.29), spasm of muscle of lower back (O53.040)  Referring Provider (secondary): Jax Kaufman MD  PT Visit Information  Total # of Visits Approved: 12  Total # of Visits to Date: 9  Progress Note Due Date: 10/29/22  Subjective  Subjective: Patient states he feels he has improved since starting therapy. Carrying ability has improved, tightness not as prevalent. Back has improved as well, he has his HEP and is routinely performing.   Pain Screening  Pain Assessment: 0-10  Pain Level: 0       Treatment Activities:  Exercises:      Treatment Reasoning    Exercise 1: ++treatment of right sided LBP and bilateral lateral epicondylitis  Exercise 2: supine lower trunk rotation stretch to left--4 reps, 15 sec hold  Exercise 3: supine right quadratus stretch to left--4 reps, 15 sec hold  Exercise 4: isometric resisted right hip extension from SKTC--8 reps, 5\" hold  Exercise 5: isometric resisted left hip flexion at 90/90--8 reps, 5\" hold  Exercise 6: pelvic tilts with folded towel under sacrum--15 reps, 3-5\" hold  Exercise 7: right single leg bridge x 15  Exercise 8: Bilateral contract/relax hamstring stretch--4 reps, 10\" hold at end range  Exercise 9: axial traction to bilateral LE's--4 reps, 15 sec hold  Exercise 10: prone on elbows (static stretch) --3 mins  Exercise 11: bilateral hip abduction--15 reps bilaterally with 2# weight MARIELA  Exercise 12: standing left hip hike x 10  Exercise 13: bilateral tennis elbow stretch, neutral and with ulnar deviation 4 x 10 sec each  Exercise 14: bilateral 2nd elbow stretch (shoulder abduction, then extension, pronation, wrist flexion with ulnar deviation)--4 reps, 10 sec hold  Exercise 15: cane roll ups-- light weight to start- 1.5# x 10 reps  Exercise 16: bilateral hand gripper, blue--2 mins each  Exercise 17: bilateral blue ball squeeze--2 mins each  Exercise 18: wrist flexion bilaterally, gradually increasing weight- 2 x15 reps, 5#  Exercise 19: HEP given 10/13/22   Full routine not performed 10/27/22 due to reassessment. Assessment:   Conditions Requiring Skilled Therapeutic Intervention  Assessment: Patient has made good progress as determined by the results of today's PT reassessment. He has shown increased wrist extensor strength bilaterally, has less arm pain with carrying/lifting and with pouring motion (ie gallon of milk). He has 25 degrees lumbar extension, 90 degrees bilateral hamstring extensibility, less back pain when performing his work duties (worse with awkward positions and on ladder), and he has met his PT goal for the Oswestry Disability score. HEP has been issued and Mr. Kelsie Granados reports he has been performing them and that his program is well understood. By mutual agreement, will make today his last PT visit and he is agreeable to continue independently with his HEP. Discharge today.   Requires PT Follow-Up: No      Goals:  Short Term Goals  Time Frame for Short Term Goals: 3-4 weeks  Short Term Goal 1: Patient to be independent with HEP. STG 1 Current Status[de-identified] 10/27/22 Patient has HEP and reports he is routinely performing them. STG Goal 1 Status[de-identified] Met  Short Term Goal 2: Patient to report lessened frequency/intensity of lower back pain at work. STG 2 Current Status[de-identified] 10/27/22 Patient is reporting less frequent/intense back pain on average, not intermittent in nature. STG Goal 2 Status[de-identified] In progress  Short Term Goal 3: Patient to report lessened difficulty pouring gallon of milk using right arm (decreased elbow pain). STG 3 Current Status[de-identified] 10/27/22 Patient reports greater ease trying to pour a gallon of milk with right arm. STG Goal 3 Status[de-identified] In progress  Long Term Goals  Time Frame for Long Term Goals : 4-6 weeks  Long Term Goal 1: Patient to have >= 25 degrees lumbar extension. LTG 1 Current Status[de-identified] 10/27/22 Patient has 25 degrees of lumbar extension. LTG Goal 1 Status[de-identified] Met  Long Term Goal 2: Patient to have >= 90 degrees bilateral hamstring extensibility (lying supine, SLR). LTG 2 Current Status[de-identified] 10/27/22 Patient has >=90 degrees supine SLR. LTG Goal 2 Status[de-identified] Met  Long Term Goal 3: Partient to have >= 5/5 bilateral wrist extensor strength. LTG 3 Current Status[de-identified] 10/27/22 Patient has 5/5 bilateral wrist extensor strength. LTG Goal 3 Status[de-identified] Met  Long Term Goal 4: Patient to score <= 10% impairment on the Oswestry Disability Questionnaire. LTG 4 Current Status[de-identified] 10/27/22 6% impairment on the Oswestry Disability Questionairre. LTG Goal 4 Status[de-identified] Met  Patient Goals   Patient Goals : Decreased back and elbow pain.     Plan:    Physcial Therapy Plan  Plan weeks: 4-6 weeks  Current Treatment Recommendations: Strengthening, ROM, Patient/Caregiver education & training, Home exercise program, Pain management, Modalities, Manual Therapy - Joint Manipulation, Manual Therapy - Soft Tissue Mobilization  Additional Comments: Discharge from PT today.   Timed Code Treatment Minutes: 28 Minutes     Therapy Time:   Individual Concurrent Group Co-treatment   Time In 5330         Time Out 0923         Minutes 28         Timed Code Treatment Minutes: 699 Melvi Kingston, PT

## 2022-11-09 ENCOUNTER — OFFICE VISIT (OUTPATIENT)
Dept: ENT CLINIC | Age: 37
End: 2022-11-09
Payer: COMMERCIAL

## 2022-11-09 VITALS
HEIGHT: 69 IN | DIASTOLIC BLOOD PRESSURE: 72 MMHG | BODY MASS INDEX: 30.51 KG/M2 | SYSTOLIC BLOOD PRESSURE: 120 MMHG | WEIGHT: 206 LBS

## 2022-11-09 DIAGNOSIS — K21.9 LARYNGOPHARYNGEAL REFLUX (LPR): ICD-10-CM

## 2022-11-09 DIAGNOSIS — R09.89 GLOBUS SENSATION: Primary | ICD-10-CM

## 2022-11-09 DIAGNOSIS — B37.0 ORAL THRUSH: ICD-10-CM

## 2022-11-09 PROBLEM — R09.A2 GLOBUS SENSATION: Status: ACTIVE | Noted: 2022-11-09

## 2022-11-09 PROCEDURE — 31575 DIAGNOSTIC LARYNGOSCOPY: CPT | Performed by: PHYSICIAN ASSISTANT

## 2022-11-09 PROCEDURE — 99213 OFFICE O/P EST LOW 20 MIN: CPT | Performed by: PHYSICIAN ASSISTANT

## 2022-11-09 RX ORDER — FLUCONAZOLE 100 MG/1
100 TABLET ORAL DAILY
Qty: 10 TABLET | Refills: 0 | Status: SHIPPED | OUTPATIENT
Start: 2022-11-09 | End: 2022-11-15 | Stop reason: ALTCHOICE

## 2022-11-09 RX ORDER — OMEPRAZOLE 20 MG/1
20 CAPSULE, DELAYED RELEASE ORAL
Qty: 60 CAPSULE | Refills: 5 | Status: SHIPPED | OUTPATIENT
Start: 2022-11-09

## 2022-11-09 ASSESSMENT — ENCOUNTER SYMPTOMS
SINUS PRESSURE: 0
SORE THROAT: 0
VOICE CHANGE: 0
TROUBLE SWALLOWING: 0
FACIAL SWELLING: 0
RHINORRHEA: 0
EYE PAIN: 0
PHOTOPHOBIA: 0
SINUS PAIN: 0

## 2022-11-09 NOTE — PROGRESS NOTES
[facility] OTOLARYNGOLOGY/ENT  Chanda Cabot is a pleasant 70-year-old  male that was referred by Dr. Jerome Wolfe due to problems with a clicking sensation to the right side of the hyoid bone. Patient reports this was first noted about a month or 2 ago. He reports of difficulty swallowing and feels like he has globus sensation to this region. He denies any dysphagia to include solids or liquids. He also denies any vocal hoarseness. Chanda Cabot denies any traumatic injury to the neck region and denies any previous surgery. Overall he feels like something is wrong and is requesting evaluation. Patient denies the use of tobacco products. Allergies: Amoxicillin, Other, and Pcn [penicillins]      Current Outpatient Medications   Medication Sig Dispense Refill    omeprazole (PRILOSEC) 20 MG delayed release capsule Take 1 capsule by mouth 2 times daily (before meals) 60 capsule 5    nystatin (MYCOSTATIN) 942565 UNIT/ML suspension Take 5 mLs by mouth 3 times daily for 10 days Retain in mouth as long as possible and swallow 200 mL 0    fluconazole (DIFLUCAN) 100 MG tablet Take 1 tablet by mouth daily for 10 days 10 tablet 0    indomethacin (INDOCIN) 50 MG capsule Take 1 capsule by mouth 3 times daily 180 capsule 1    tamsulosin (FLOMAX) 0.4 MG capsule Take 1 capsule by mouth at bedtime 30 capsule 0    Melatonin 5 MG CAPS 5-10 mg at bedtime as needed for insomnia  5    albuterol sulfate HFA (PROAIR HFA) 108 (90 Base) MCG/ACT inhaler Inhale 2 puffs into the lungs every 4 hours as needed for Wheezing or Shortness of Breath 2 Inhaler 3    Multiple Vitamins-Minerals (THERAPEUTIC MULTIVITAMIN-MINERALS) tablet Take 1 tablet by mouth daily      Fexofenadine HCl (ALLEGRA PO) Take by mouth       No current facility-administered medications for this visit.        Past Surgical History:   Procedure Laterality Date    CYST REMOVAL      MASTECTOMY Right 2003    due to gynecomastia       Past Medical History:   Diagnosis Date Anxiety     Bilateral low back pain without sciatica 9/14/2018    Chronic back pain     Chronic lung disease of prematurity     Depression with anxiety     Gynecomastia 2003    right side    Insomnia     Perennial allergic rhinitis 8/28/2017    Premature infant of 30 weeks gestation     Birth Weight 3lbs 11 oz       Family History   Problem Relation Age of Onset    Heart Disease Paternal Grandfather     Prostate Cancer Father     Diabetes Daughter         Type I       Social History     Tobacco Use    Smoking status: Never    Smokeless tobacco: Never   Substance Use Topics    Alcohol use: Yes     Alcohol/week: 0.0 standard drinks     Comment: rarely           REVIEW OF SYSTEMS:  all other systems reviewed and are negative  Review of Systems   Constitutional:  Negative for chills and fever. HENT:  Negative for congestion, dental problem, ear discharge, ear pain, facial swelling, hearing loss, postnasal drip, rhinorrhea, sinus pressure, sinus pain, sore throat, tinnitus, trouble swallowing and voice change. Eyes:  Negative for photophobia and pain. Neurological:  Negative for dizziness and headaches. Comments:     PHYSICAL EXAM:    /72   Ht 5' 9\" (1.753 m)   Wt 206 lb (93.4 kg)   BMI 30.42 kg/m²   Body mass index is 30.42 kg/m². General Appearance: well developed  and well nourished  Head/ Face: normocephalic and atraumatic  Vocal Quality: good/ normal  Ears: Right Ear: External: external ears normal Otoscopy Ear Canal: canal clear Otoscopy TM: TM's normal and TM's mobile Left Ear: External: external ears normal Otoscopy Ear Canal: canal clear Otoscopy TM: TM's normal and TM's mobile  Hearing: grossly intact  Nose: see endoscopy report  Neck: supple and adenopathy none palpable  Thyroid: tender No and nodules No  Oral exam demonstrated the tongue to be midline with questionable thrush to the anterior surface however this was not notable by scraping of the tongue blade.   No abnormalities were noted to the posterior pharynx. Neck exam demonstrated no evidence of a dislocation of the hyoid bone or any obvious abnormalities. Cervical spine films were personally reviewed and was noted to be negative for any abnormalities. Assessment & Plan:    Problem List Items Addressed This Visit       Globus sensation - Primary     Globus sensation secondary to LPR and oral thrush based on laryngoscopy  Plan: Overall I believe this is the etiology of his globus sensation and difficulty swallowing. I will place him on omeprazole twice a day and treat the oral thrush. He is to follow-up in 6 weeks for reevaluation. Relevant Orders    MS LARYNGOSCOPY FLEXIBLE DIAGNOSTIC (Completed)    Laryngopharyngeal reflux (LPR)    Oral thrush       Orders Placed This Encounter   Procedures    MS LARYNGOSCOPY FLEXIBLE DIAGNOSTIC     The nares were anesthetized with 4% lidocaine aerosol bilaterally. Each nare was was individually evaluate where the patient was found to have engorged turbinates with postnasal drip present. The left side was utilized for the full procedure. The posterior wall of the nasopharyngeal region demonstrated some postnasal drip to be present. No masses were noted at this level. The scope was advanced to the oropharyngeal region where the patient was found to have normal swallowing with no ulcerations or masses. The scope was advanced to the epiglottis where the vocal cords were well visualized. The vocal cords were edematous with the right greater than left with no polyps or nodularities. The vocal cords were symmetrical and fully functional.  He was noted to have erythematous changes to the vocal cords as well as the surface of the glottis. No masses were noted at this level. The tongue was extended and demonstrated extensive amount of thrush to be present that covered about a third of the posterior tongue. No additional findings were appreciated.   The patient tolerated the procedure nicely with no complications. Orders Placed This Encounter   Medications    omeprazole (PRILOSEC) 20 MG delayed release capsule     Sig: Take 1 capsule by mouth 2 times daily (before meals)     Dispense:  60 capsule     Refill:  5    nystatin (MYCOSTATIN) [id number] UNIT/ML suspension     Sig: Take 5 mLs by mouth 3 times daily for 10 days Retain in mouth as long as possible and swallow     Dispense:  200 mL     Refill:  0    fluconazole (DIFLUCAN) 100 MG tablet     Sig: Take 1 tablet by mouth daily for 10 days     Dispense:  10 tablet     Refill:  0         Electronically signed by Brady Herrera PA-C on 11/9/22 at 9:45 AM CST      Please note that this chart was generated using dragon dictation software. Although every effort was made to ensure the accuracy of this automated transcription, some errors in transcription may have occurred.

## 2022-11-09 NOTE — ASSESSMENT & PLAN NOTE
Globus sensation secondary to LPR and oral thrush based on laryngoscopy  Plan: Overall I believe this is the etiology of his globus sensation and difficulty swallowing. I will place him on omeprazole twice a day and treat the oral thrush. He is to follow-up in 6 weeks for reevaluation.

## 2022-11-15 ENCOUNTER — OFFICE VISIT (OUTPATIENT)
Dept: UROLOGY | Age: 37
End: 2022-11-15
Payer: COMMERCIAL

## 2022-11-15 VITALS — WEIGHT: 206 LBS | TEMPERATURE: 98.1 F | HEIGHT: 69 IN | BODY MASS INDEX: 30.51 KG/M2

## 2022-11-15 DIAGNOSIS — I86.1 LEFT VARICOCELE: ICD-10-CM

## 2022-11-15 DIAGNOSIS — N50.3 CYST OF EPIDIDYMIS: ICD-10-CM

## 2022-11-15 DIAGNOSIS — N50.811 PAIN IN BOTH TESTICLES: ICD-10-CM

## 2022-11-15 DIAGNOSIS — N50.812 PAIN IN BOTH TESTICLES: ICD-10-CM

## 2022-11-15 DIAGNOSIS — N13.8 BPH WITH OBSTRUCTION/LOWER URINARY TRACT SYMPTOMS: Primary | ICD-10-CM

## 2022-11-15 DIAGNOSIS — N40.1 BPH WITH OBSTRUCTION/LOWER URINARY TRACT SYMPTOMS: Primary | ICD-10-CM

## 2022-11-15 LAB
APPEARANCE FLUID: CLEAR
BILIRUBIN, POC: NORMAL
BLOOD URINE, POC: NORMAL
CLARITY, POC: CLEAR
COLOR, POC: YELLOW
GLUCOSE URINE, POC: NORMAL
KETONES, POC: NORMAL
LEUKOCYTE EST, POC: NORMAL
NITRITE, POC: NORMAL
PH, POC: 5.5
PROTEIN, POC: NORMAL
SPECIFIC GRAVITY, POC: 1
UROBILINOGEN, POC: 0.2

## 2022-11-15 PROCEDURE — 51798 US URINE CAPACITY MEASURE: CPT | Performed by: NURSE PRACTITIONER

## 2022-11-15 PROCEDURE — 81002 URINALYSIS NONAUTO W/O SCOPE: CPT | Performed by: NURSE PRACTITIONER

## 2022-11-15 PROCEDURE — 99213 OFFICE O/P EST LOW 20 MIN: CPT | Performed by: NURSE PRACTITIONER

## 2022-11-15 RX ORDER — TAMSULOSIN HYDROCHLORIDE 0.4 MG/1
0.4 CAPSULE ORAL NIGHTLY
Qty: 90 CAPSULE | Refills: 3 | Status: SHIPPED | OUTPATIENT
Start: 2022-11-15

## 2022-11-15 NOTE — PROGRESS NOTES
Viri Garcia is a 39 y.o., male, Established patient who presents today   Chief Complaint   Patient presents with    Follow-up     I am here today for my 4 week follow up. HPI   Patient presents for follow-up of testicular pain and lower urinary tract symptoms. .  At his previous appointment, he was noted to have bilateral epididymal cysts as well as a left-sided varicocele. He was treated with Flexeril, Indocin, and Flomax. He states since completing therapy, he has had significant improvement in his symptoms and his pain is almost completely resolved. He reports his lower urinary tract symptoms have also improved significantly with initiation of alpha-blocker therapy. REVIEW OF SYSTEMS:  Review of Systems   Constitutional:  Negative for chills and fever. Gastrointestinal:  Negative for abdominal distention, abdominal pain, nausea and vomiting. Genitourinary:  Negative for difficulty urinating, dysuria, flank pain, frequency, hematuria and urgency. Musculoskeletal:  Negative for back pain and gait problem. Psychiatric/Behavioral:  Negative for agitation and confusion. PHYSICAL EXAM:  Temp 98.1 °F (36.7 °C) (Temporal)   Ht 5' 9\" (1.753 m)   Wt 206 lb (93.4 kg)   BMI 30.42 kg/m²   Physical Exam  Vitals and nursing note reviewed. Constitutional:       General: He is not in acute distress. Appearance: Normal appearance. He is not ill-appearing. Pulmonary:      Effort: Pulmonary effort is normal. No respiratory distress. Abdominal:      General: There is no distension. Tenderness: There is no abdominal tenderness. There is no right CVA tenderness or left CVA tenderness. Neurological:      Mental Status: He is alert and oriented to person, place, and time. Mental status is at baseline.    Psychiatric:         Mood and Affect: Mood normal.         Behavior: Behavior normal.       DATA:  Results for orders placed or performed in visit on 11/15/22   POCT Urinalysis no Micro Result Value Ref Range    Color, UA yellow     Clarity, UA clear     Glucose, UA POC neg     Bilirubin, UA neg     Ketones, UA neg     Spec Grav, UA 1.005     Blood, UA POC neg     pH, UA 5.5     Protein, UA POC neg     Urobilinogen, UA 0.2     Leukocytes, UA neg     Nitrite, UA neg     Appearance, Fluid Clear Clear, Slightly Cloudy     ASSESSMENT/PLAN  1. BPH with obstruction/lower urinary tract symptoms  Lower urinary tract symptoms significantly improved with Flomax. Would like to stay on this therapy. - HI Measure, post-void residual, US, non-imaging  - POCT Urinalysis no Micro  - tamsulosin (FLOMAX) 0.4 MG capsule; Take 1 capsule by mouth at bedtime  Dispense: 90 capsule; Refill: 3    2. Pain in both testicles  3. Left varicocele  4. Cyst of epididymis  Pain has completely resolved with medical therapy. Patient does not currently need referral to vascular for clinical embolization. We will plan on following up in about 3 months to ensure his symptoms remain stable. Orders Placed This Encounter   Procedures    POCT Urinalysis no Micro    HI Measure, post-void residual, US, non-imaging          Return in about 3 months (around 2/15/2023). An electronic signature was used to authenticate this note. KELLY QUINN, MITZI - CNP    All information inputted into the note by the MA to include chief complaint, past medical history, past surgical history, medications, allergies, social and family history and review of systems has been reviewed and updated as needed by me. EMR Dragon/transcription disclaimer: Much of this document is electronic transcription/translation of spoken language to printed text. The electronic translation of spoken language may be erroneous or, at times, nonsensical words or phrases may be inadvertently transcribed.  Although I have reviewed the document for such errors, some may still exist.

## 2022-11-18 ASSESSMENT — ENCOUNTER SYMPTOMS
ABDOMINAL DISTENTION: 0
BACK PAIN: 0
NAUSEA: 0
ABDOMINAL PAIN: 0
VOMITING: 0

## 2022-12-27 ENCOUNTER — OFFICE VISIT (OUTPATIENT)
Dept: PRIMARY CARE CLINIC | Age: 37
End: 2022-12-27
Payer: COMMERCIAL

## 2022-12-27 VITALS
DIASTOLIC BLOOD PRESSURE: 70 MMHG | OXYGEN SATURATION: 96 % | HEART RATE: 118 BPM | BODY MASS INDEX: 29.55 KG/M2 | SYSTOLIC BLOOD PRESSURE: 120 MMHG | TEMPERATURE: 96.9 F | HEIGHT: 69 IN | WEIGHT: 199.5 LBS

## 2022-12-27 DIAGNOSIS — E55.9 VITAMIN D DEFICIENCY: ICD-10-CM

## 2022-12-27 DIAGNOSIS — E66.3 OVERWEIGHT (BMI 25.0-29.9): ICD-10-CM

## 2022-12-27 DIAGNOSIS — Z86.16 HISTORY OF COVID-19: ICD-10-CM

## 2022-12-27 DIAGNOSIS — R53.82 CHRONIC FATIGUE: Primary | ICD-10-CM

## 2022-12-27 PROBLEM — B37.0 ORAL THRUSH: Status: RESOLVED | Noted: 2022-11-09 | Resolved: 2022-12-27

## 2022-12-27 PROCEDURE — 99214 OFFICE O/P EST MOD 30 MIN: CPT | Performed by: INTERNAL MEDICINE

## 2022-12-27 ASSESSMENT — ENCOUNTER SYMPTOMS
COLOR CHANGE: 0
SINUS PRESSURE: 0
DIARRHEA: 0
ABDOMINAL PAIN: 0
EYE PAIN: 0
COUGH: 0
SHORTNESS OF BREATH: 0
WHEEZING: 0
BLOOD IN STOOL: 0
CHEST TIGHTNESS: 0
SORE THROAT: 0
EYE REDNESS: 0
RHINORRHEA: 0
VOMITING: 0
EYE DISCHARGE: 0
VOICE CHANGE: 0

## 2022-12-27 NOTE — PROGRESS NOTES
Rosa Owens is a 40 y.o. male who presents today for   Chief Complaint   Patient presents with    Other     Fertility/ testosterone concerns       HPI  39 y/o WM here with concerns for possible testosterone deficiency due to persistent fatigue. He does not have any ED so to speak but has had some retrograde ejaculation with flomax but he feels like he is urinating better. Patient also has concerns about potential fertility issues because his previous partner that he has one daughter with only got pregnant once in their 13 yr relationship despite no use of contraception and his daughter is 6 yrs old. His ex does have a history of PCOS but got pregnant with her new partner after only being together around 3-6 months (estimated). Patient has been with current partner for about a year and they may want to have children in the future. Patient had Covid-19 infection over the Summer with home testing used for diagnosis but symptoms were not overly severe other than the fatigue and he had been vaccinated for covid-19 before he became sick. BMI Readings from Last 3 Encounters:   12/27/22 29.46 kg/m²   11/15/22 30.42 kg/m²   11/09/22 30.42 kg/m²     Wt Readings from Last 3 Encounters:   12/27/22 199 lb 8 oz (90.5 kg)   11/15/22 206 lb (93.4 kg)   11/09/22 206 lb (93.4 kg)          Review of Systems   Constitutional:  Positive for fatigue. Negative for appetite change, chills and fever. HENT:  Negative for ear pain, rhinorrhea, sinus pressure, sore throat and voice change. Eyes:  Negative for pain, discharge and redness. Respiratory:  Negative for cough, chest tightness, shortness of breath and wheezing. Cardiovascular:  Negative for chest pain and palpitations. Gastrointestinal:  Negative for abdominal pain, blood in stool, diarrhea and vomiting. Endocrine: Negative for cold intolerance, heat intolerance and polydipsia. Genitourinary:  Negative for dysuria and hematuria.    Musculoskeletal:  Negative for arthralgias, myalgias, neck pain and neck stiffness. Skin:  Negative for color change and rash. Neurological:  Negative for dizziness, tremors, syncope, speech difficulty, weakness, numbness and headaches. Hematological:  Negative for adenopathy. Does not bruise/bleed easily. Psychiatric/Behavioral:  Negative for confusion, dysphoric mood and sleep disturbance. The patient is not nervous/anxious. All other systems reviewed and are negative. Past Medical History:   Diagnosis Date    Anxiety     Bilateral low back pain without sciatica 9/14/2018    Chronic back pain     Chronic lung disease of prematurity     Depression with anxiety     Gynecomastia 2003    right side    Insomnia     Perennial allergic rhinitis 8/28/2017    Premature infant of 30 weeks gestation     Birth Weight 3lbs 11 oz       Current Outpatient Medications   Medication Sig Dispense Refill    tamsulosin (FLOMAX) 0.4 MG capsule Take 1 capsule by mouth at bedtime 90 capsule 3    albuterol sulfate HFA (PROAIR HFA) 108 (90 Base) MCG/ACT inhaler Inhale 2 puffs into the lungs every 4 hours as needed for Wheezing or Shortness of Breath 2 Inhaler 3    Multiple Vitamins-Minerals (THERAPEUTIC MULTIVITAMIN-MINERALS) tablet Take 1 tablet by mouth daily      Fexofenadine HCl (ALLEGRA PO) Take by mouth       No current facility-administered medications for this visit. Allergies   Allergen Reactions    Amoxicillin     Other Rash     STERI STRIPS    Pcn [Penicillins] Rash       Past Surgical History:   Procedure Laterality Date    CYST REMOVAL      MASTECTOMY Right 2003    due to gynecomastia       Social History     Tobacco Use    Smoking status: Never    Smokeless tobacco: Never   Vaping Use    Vaping Use: Never used   Substance Use Topics    Alcohol use:  Yes     Alcohol/week: 0.0 standard drinks     Comment: rarely    Drug use: No       Family History   Problem Relation Age of Onset    Heart Disease Paternal Grandfather     Prostate Cancer Father     Diabetes Daughter         Type I       /70   Pulse (!) 118   Temp 96.9 °F (36.1 °C)   Ht 5' 9\" (1.753 m)   Wt 199 lb 8 oz (90.5 kg)   SpO2 96%   BMI 29.46 kg/m²     Physical Exam  Vitals reviewed. Constitutional:       General: He is not in acute distress. Appearance: Normal appearance. He is well-developed, well-groomed and overweight. He is not ill-appearing or toxic-appearing. HENT:      Head: Normocephalic and atraumatic. Right Ear: External ear normal.      Left Ear: External ear normal.      Nose: Nose normal.      Mouth/Throat:      Lips: Pink. Mouth: Mucous membranes are moist.   Eyes:      General:         Right eye: No discharge. Left eye: No discharge. Conjunctiva/sclera: Conjunctivae normal.      Pupils: Pupils are equal, round, and reactive to light. Neck:      Thyroid: No thyromegaly. Vascular: Normal carotid pulses. No carotid bruit or JVD. Trachea: Trachea and phonation normal. No tracheal tenderness. Cardiovascular:      Rate and Rhythm: Normal rate and regular rhythm. Pulses:           Carotid pulses are 2+ on the right side and 2+ on the left side. Posterior tibial pulses are 2+ on the right side and 2+ on the left side. Heart sounds: Normal heart sounds. No murmur heard. No friction rub. No gallop. Pulmonary:      Effort: Pulmonary effort is normal. No accessory muscle usage. Breath sounds: Normal breath sounds. No decreased breath sounds, wheezing, rhonchi or rales. Abdominal:      General: Bowel sounds are normal. There is no distension. Palpations: Abdomen is soft. There is no mass. Tenderness: There is no abdominal tenderness. There is no guarding or rebound. Hernia: No hernia is present. Musculoskeletal:         General: No swelling, tenderness or deformity. Right wrist: Normal.      Left wrist: Normal.      Cervical back: Normal range of motion and neck supple. No rigidity. No muscular tenderness. Right lower leg: No edema. Left lower leg: No edema. Right ankle: Normal.      Left ankle: Normal.   Lymphadenopathy:      Cervical: No cervical adenopathy. Upper Body:      Right upper body: No supraclavicular adenopathy. Left upper body: No supraclavicular adenopathy. Skin:     General: Skin is warm. Capillary Refill: Capillary refill takes less than 2 seconds. Coloration: Skin is not cyanotic. Findings: No rash. Nails: There is no clubbing. Neurological:      Mental Status: He is alert and oriented to person, place, and time. Cranial Nerves: No cranial nerve deficit or dysarthria. Motor: No weakness, tremor or abnormal muscle tone. Coordination: Coordination normal.      Gait: Gait is intact. Comments: CN II-XII grossly intact, speech clear, no facial droop, MAEW   Psychiatric:         Attention and Perception: Attention and perception normal.         Mood and Affect: Mood and affect normal.         Speech: Speech normal.         Behavior: Behavior normal. Behavior is cooperative. Thought Content:  Thought content normal.         Cognition and Memory: Cognition and memory normal.         Judgment: Judgment normal.       Lab Results   Component Value Date    WBC 5.41 10/11/2022    HGB 16.6 10/11/2022    HCT 49.0 10/11/2022    MCV 89.1 10/11/2022     10/11/2022    LYMPHOPCT 16.9 (L) 10/11/2022    RBC 5.50 10/11/2022    MCH 30.2 10/11/2022    MCHC 33.9 10/11/2022    RDW 13.0 10/11/2022     Lab Results   Component Value Date/Time     08/25/2022 12:57 PM    K 4.0 08/25/2022 12:57 PM     08/25/2022 12:57 PM    CO2 31 08/25/2022 12:57 PM    BUN 13 08/25/2022 12:57 PM    CREATININE 0.9 08/25/2022 12:57 PM    GLUCOSE 90 08/25/2022 12:57 PM    CALCIUM 9.5 08/25/2022 12:57 PM     Lab Results   Component Value Date    TSH 1.130 07/08/2020     Lab Results   Component Value Date    CHOL 176 04/12/2022    CHOL 160 08/04/2021    CHOL 167 03/05/2020     Lab Results   Component Value Date    TRIG 73 04/12/2022    TRIG 94 08/04/2021    TRIG 83 03/05/2020     Lab Results   Component Value Date    HDL 55 04/12/2022    HDL 54 (L) 08/04/2021    HDL 47 (L) 03/05/2020     Lab Results   Component Value Date    LDLCALC 106 04/12/2022    LDLCALC 87 08/04/2021    LDLCALC 103 03/05/2020     No results found for: LABVLDL, VLDL  No results found for: CHOLHDLRATIO    No results found for this visit on 12/27/22. Assessment:    ICD-10-CM    1. Chronic fatigue  R53.82 CBC with Auto Differential     Comprehensive Metabolic Panel     TSH     T4, Free     Luteinizing Hormone     Testosterone Free and Total, Non-Male      2. Vitamin D deficiency  E55.9 Vitamin D 25 Hydroxy      3. History of COVID-19  Z86.16       4. Overweight (BMI 25.0-29. 9)  E66.3           Plan:  Carmen Soares was seen today for other. Diagnoses and all orders for this visit:    Chronic fatigue  -     CBC with Auto Differential; Future  -     Comprehensive Metabolic Panel; Future  -     TSH; Future  -     T4, Free; Future  -     Luteinizing Hormone; Future  -     Testosterone Free and Total, Non-Male; Future    Vitamin D deficiency  -     Vitamin D 25 Hydroxy; Future    History of COVID-19    Overweight (BMI 25.0-29. 9)      Return if symptoms worsen or fail to improve. Over 50% of the total visit time of 30 minutes was spent on counseling and/or coordination of care of:   1. Chronic fatigue    2. Vitamin D deficiency    3. History of COVID-19    4. Overweight (BMI 25.0-29. 9)         Orders Placed This Encounter   Procedures    CBC with Auto Differential     Standing Status:   Future     Standing Expiration Date:   12/27/2023    Comprehensive Metabolic Panel     Standing Status:   Future     Standing Expiration Date:   12/27/2023    TSH     Standing Status:   Future     Standing Expiration Date:   12/27/2023    T4, Free     Standing Status:   Future     Standing Expiration Date:   12/27/2023    Vitamin D 25 Hydroxy     Standing Status:   Future     Standing Expiration Date:   12/27/2023    Luteinizing Hormone     Standing Status:   Future     Standing Expiration Date:   12/27/2023    Testosterone Free and Total, Non-Male     Standing Status:   Future     Standing Expiration Date:   12/27/2023     No orders of the defined types were placed in this encounter. Medications Discontinued During This Encounter   Medication Reason    indomethacin (INDOCIN) 50 MG capsule LIST CLEANUP    Melatonin 5 MG CAPS LIST CLEANUP    omeprazole (PRILOSEC) 20 MG delayed release capsule LIST CLEANUP     There are no Patient Instructions on file for this visit. Patient voices understanding and agrees to plans along with risks and benefits of plan. Counseling:  Evelynerashad Denny's case, medications and options were discussed in detail. patient was instructed to call the office if he   questions regarding him treatment. Should him conditions worsen, he should return to office to be reassessed by Dr. Nahum Sinha. he  Should to go the closest Emergency Department for any emergency. They verbalized understanding the above instructions.

## 2022-12-28 DIAGNOSIS — E55.9 VITAMIN D DEFICIENCY: ICD-10-CM

## 2022-12-28 DIAGNOSIS — R53.82 CHRONIC FATIGUE: ICD-10-CM

## 2022-12-28 LAB
ALBUMIN SERPL-MCNC: 4.7 G/DL (ref 3.5–5.2)
ALP BLD-CCNC: 77 U/L (ref 40–130)
ALT SERPL-CCNC: 28 U/L (ref 5–41)
ANION GAP SERPL CALCULATED.3IONS-SCNC: 13 MMOL/L (ref 7–19)
AST SERPL-CCNC: 22 U/L (ref 5–40)
BASOPHILS ABSOLUTE: 0 K/UL (ref 0–0.2)
BASOPHILS RELATIVE PERCENT: 1 % (ref 0–1)
BILIRUB SERPL-MCNC: 0.4 MG/DL (ref 0.2–1.2)
BUN BLDV-MCNC: 14 MG/DL (ref 6–20)
CALCIUM SERPL-MCNC: 9.2 MG/DL (ref 8.6–10)
CHLORIDE BLD-SCNC: 102 MMOL/L (ref 98–111)
CO2: 25 MMOL/L (ref 22–29)
CREAT SERPL-MCNC: 0.9 MG/DL (ref 0.5–1.2)
EOSINOPHILS ABSOLUTE: 0.1 K/UL (ref 0–0.6)
EOSINOPHILS RELATIVE PERCENT: 2 % (ref 0–5)
GFR SERPL CREATININE-BSD FRML MDRD: >60 ML/MIN/{1.73_M2}
GLUCOSE BLD-MCNC: 102 MG/DL (ref 74–109)
HCT VFR BLD CALC: 45.2 % (ref 42–52)
HEMOGLOBIN: 15.8 G/DL (ref 14–18)
IMMATURE GRANULOCYTES #: 0 K/UL
LUTEINIZING HORMONE: 5 MIU/ML
LYMPHOCYTES ABSOLUTE: 1.8 K/UL (ref 1.1–4.5)
LYMPHOCYTES RELATIVE PERCENT: 44 % (ref 20–40)
MCH RBC QN AUTO: 30.2 PG (ref 27–31)
MCHC RBC AUTO-ENTMCNC: 35 G/DL (ref 33–37)
MCV RBC AUTO: 86.3 FL (ref 80–94)
MONOCYTES ABSOLUTE: 0.3 K/UL (ref 0–0.9)
MONOCYTES RELATIVE PERCENT: 6.8 % (ref 0–10)
NEUTROPHILS ABSOLUTE: 1.8 K/UL (ref 1.5–7.5)
NEUTROPHILS RELATIVE PERCENT: 45.9 % (ref 50–65)
PDW BLD-RTO: 12.7 % (ref 11.5–14.5)
PLATELET # BLD: 277 K/UL (ref 130–400)
PMV BLD AUTO: 10.4 FL (ref 9.4–12.4)
POTASSIUM SERPL-SCNC: 3.8 MMOL/L (ref 3.5–5)
RBC # BLD: 5.24 M/UL (ref 4.7–6.1)
SODIUM BLD-SCNC: 140 MMOL/L (ref 136–145)
T4 FREE: 1.27 NG/DL (ref 0.93–1.7)
TOTAL PROTEIN: 6.8 G/DL (ref 6.6–8.7)
TSH SERPL DL<=0.05 MIU/L-ACNC: 1.77 UIU/ML (ref 0.27–4.2)
VITAMIN D 25-HYDROXY: 43.7 NG/ML
WBC # BLD: 4 K/UL (ref 4.8–10.8)

## 2022-12-29 LAB
SEX HORMONE BINDING GLOBULIN: 48 NMOL/L (ref 17–56)
TESTOSTERONE FREE PERCENT: 1.5 % (ref 1.6–2.9)
TESTOSTERONE FREE, CALC: 83 PG/ML (ref 47–244)
TESTOSTERONE TOTAL-MALE: 541 NG/DL (ref 300–1080)

## 2023-01-04 ENCOUNTER — OFFICE VISIT (OUTPATIENT)
Dept: ENT CLINIC | Age: 38
End: 2023-01-04
Payer: COMMERCIAL

## 2023-01-04 VITALS — WEIGHT: 207 LBS | SYSTOLIC BLOOD PRESSURE: 128 MMHG | DIASTOLIC BLOOD PRESSURE: 80 MMHG | BODY MASS INDEX: 30.57 KG/M2

## 2023-01-04 DIAGNOSIS — K21.9 LARYNGOPHARYNGEAL REFLUX (LPR): Primary | ICD-10-CM

## 2023-01-04 PROCEDURE — 99213 OFFICE O/P EST LOW 20 MIN: CPT | Performed by: PHYSICIAN ASSISTANT

## 2023-01-04 RX ORDER — OMEPRAZOLE 20 MG/1
20 CAPSULE, DELAYED RELEASE ORAL
Qty: 60 CAPSULE | Refills: 2 | Status: SHIPPED | OUTPATIENT
Start: 2023-01-04

## 2023-01-04 NOTE — PROGRESS NOTES
Dianna Coker is a pleasant 72-year-old  male that presents for a 6-week follow-up after treatment for LPR. Patient reported that he had taken the omeprazole for the first 30 days and had notable improvement with his symptoms. Unfortunately his medication was accidentally discontinued and he did not get any refills. He denies any vocal hoarseness or any dysphagia to include solids or liquids        Physical examination revealed the patient to have normal vocals with no hoarseness. The ears demonstrated normal TMs canals bilaterally. Oral exam demonstrated no erythema to the posterior pharynx with no masses. Tongue was noted to be midline. Neck exam demonstrated no lymphadenopathy or thyromegaly. Impression: Clinically improving LPR    Plan: I recommended starting the patient back on omeprazole 20 mg twice a day and will treat for a total of 2 months for his LPR. He is to follow-up in 6 weeks for repeat laryngoscopy for reevaluation. Patient was reminded to call if he has any questions or problems.       Electronically signed by Foreign Lennon PA-C on 1/4/23 at 11:11 AM CST

## 2023-01-06 DIAGNOSIS — Z91.89 AT RISK FOR NARCOLEPSY: ICD-10-CM

## 2023-01-06 DIAGNOSIS — Z91.89 AT RISK FOR OBSTRUCTIVE SLEEP APNEA: ICD-10-CM

## 2023-01-06 DIAGNOSIS — R53.82 CHRONIC FATIGUE: Primary | ICD-10-CM

## 2023-01-30 ENCOUNTER — HOSPITAL ENCOUNTER (OUTPATIENT)
Dept: SLEEP CENTER | Age: 38
Discharge: HOME | End: 2023-02-01
Payer: COMMERCIAL

## 2023-01-30 PROCEDURE — G0399 HOME SLEEP TEST/TYPE 3 PORTA: HCPCS

## 2023-02-01 PROCEDURE — 95806 SLEEP STUDY UNATT&RESP EFFT: CPT | Performed by: INTERNAL MEDICINE

## 2023-02-01 NOTE — PROGRESS NOTES
[place] [address]  [place], [address]  Phone [phone] Fax [phone]        Patient Name Shyla Merchant Account Number [de-identified]    1985 Referring Provider Ar Gonzalez M.D.   Carol Campoverde Gender 40 years/M Interpreting physician Devorah Hernandez M.D., [facility]   Neck circumference Unavailable Device Stardust II   Mallampati classification Unavailable Scoring Technician Abdirahman Madrid, CRT, RPSGT   Slayton score  Indications for the test excessive daytime somnolence   Height 69.0 in Test Home Sleep Apnea Test   Weight 199.0 lbs Date of test 2023   BMI 29.4 Time in Bed (TIB) 429.6 minutes     Events   Index (#/hr) Total # Events Mean Duration (sec) Max Duration (sec) Supine        # Index   Central Apneas 0.0 0 0.0 0.0 0 0.0   Obstructive Apneas 1.0 7 11.1 12.0 4 1.2   Mixed Apneas 0.0 0 0.0 0.0 0 0.0   Hypopneas 3.8 27 16.1 50.0 11 3.4   Estimated AHI  #events/TIB (hrs) 4.7 34 15.1 50.0 4.6   Time in Position (minutes) 194.9     Snoring  Snoring Rating (loudness 0-4) 2   Snoring Amount (% of total sleep time with snoring) 58.5     Oximetry distribution  <90 %  (minutes) 17.7   <85 %  (minutes) 3.2   <80 %  (minutes) 0.0   <75 %  (minutes) 0.0   <70 %  (minutes) 0.0   <60 %  (minutes) 0.0   <50 %  (minutes) 0.0   Average (%) 92   Desat Index (#/hour) 5.8   Desat Max (%) 6   Desat Max dur (sec) 112.5   Lowest SpO2 (³ 2 sec) (%) 80     Heart Rate  Mean HR (BPM) 64.3   # of LHR 14   LHR min (BPM) 54   # of HHR 0   HHR max (BPM) 89       Interpretation:     No evidence of sleep related breathing disorder. Obesity. Hypoxia during sleep. Recommendations:    Consider in lab sleep study if sleep apnea remains suspect. Weight loss and exercise. Avoid risky activity such as driving if sleepy. Discuss sleep hygiene with the patient. Consider further evaluation of the sleep related hypoxia.         Devorah Hernandez MD, [facility], [facility]

## 2023-02-03 ENCOUNTER — OFFICE VISIT (OUTPATIENT)
Dept: PRIMARY CARE CLINIC | Age: 38
End: 2023-02-03
Payer: COMMERCIAL

## 2023-02-03 VITALS
OXYGEN SATURATION: 98 % | TEMPERATURE: 98.4 F | WEIGHT: 204 LBS | HEART RATE: 87 BPM | SYSTOLIC BLOOD PRESSURE: 132 MMHG | BODY MASS INDEX: 30.21 KG/M2 | DIASTOLIC BLOOD PRESSURE: 82 MMHG | HEIGHT: 69 IN

## 2023-02-03 DIAGNOSIS — R10.31 CHRONIC RIGHT LOWER QUADRANT PAIN: ICD-10-CM

## 2023-02-03 DIAGNOSIS — E66.9 OBESITY (BMI 30.0-34.9): Primary | ICD-10-CM

## 2023-02-03 DIAGNOSIS — Z76.89 ENCOUNTER FOR WEIGHT MANAGEMENT: ICD-10-CM

## 2023-02-03 DIAGNOSIS — G89.29 CHRONIC RIGHT LOWER QUADRANT PAIN: ICD-10-CM

## 2023-02-03 DIAGNOSIS — R53.82 CHRONIC FATIGUE: ICD-10-CM

## 2023-02-03 PROCEDURE — 99214 OFFICE O/P EST MOD 30 MIN: CPT | Performed by: INTERNAL MEDICINE

## 2023-02-03 RX ORDER — SEMAGLUTIDE 0.25 MG/.5ML
0.25 INJECTION, SOLUTION SUBCUTANEOUS
Qty: 2 ML | Refills: 1 | Status: SHIPPED | OUTPATIENT
Start: 2023-02-03

## 2023-02-03 SDOH — ECONOMIC STABILITY: FOOD INSECURITY: WITHIN THE PAST 12 MONTHS, YOU WORRIED THAT YOUR FOOD WOULD RUN OUT BEFORE YOU GOT MONEY TO BUY MORE.: PATIENT DECLINED

## 2023-02-03 SDOH — ECONOMIC STABILITY: FOOD INSECURITY: WITHIN THE PAST 12 MONTHS, THE FOOD YOU BOUGHT JUST DIDN'T LAST AND YOU DIDN'T HAVE MONEY TO GET MORE.: PATIENT DECLINED

## 2023-02-03 SDOH — ECONOMIC STABILITY: TRANSPORTATION INSECURITY
IN THE PAST 12 MONTHS, HAS LACK OF TRANSPORTATION KEPT YOU FROM MEETINGS, WORK, OR FROM GETTING THINGS NEEDED FOR DAILY LIVING?: PATIENT DECLINED

## 2023-02-03 SDOH — ECONOMIC STABILITY: HOUSING INSECURITY
IN THE LAST 12 MONTHS, WAS THERE A TIME WHEN YOU DID NOT HAVE A STEADY PLACE TO SLEEP OR SLEPT IN A SHELTER (INCLUDING NOW)?: PATIENT REFUSED

## 2023-02-03 SDOH — ECONOMIC STABILITY: INCOME INSECURITY: HOW HARD IS IT FOR YOU TO PAY FOR THE VERY BASICS LIKE FOOD, HOUSING, MEDICAL CARE, AND HEATING?: PATIENT DECLINED

## 2023-02-03 ASSESSMENT — PATIENT HEALTH QUESTIONNAIRE - PHQ9
10. IF YOU CHECKED OFF ANY PROBLEMS, HOW DIFFICULT HAVE THESE PROBLEMS MADE IT FOR YOU TO DO YOUR WORK, TAKE CARE OF THINGS AT HOME, OR GET ALONG WITH OTHER PEOPLE: 1
7. TROUBLE CONCENTRATING ON THINGS, SUCH AS READING THE NEWSPAPER OR WATCHING TELEVISION: 0
4. FEELING TIRED OR HAVING LITTLE ENERGY: 3
8. MOVING OR SPEAKING SO SLOWLY THAT OTHER PEOPLE COULD HAVE NOTICED. OR THE OPPOSITE, BEING SO FIGETY OR RESTLESS THAT YOU HAVE BEEN MOVING AROUND A LOT MORE THAN USUAL: 0
9. THOUGHTS THAT YOU WOULD BE BETTER OFF DEAD, OR OF HURTING YOURSELF: 0
SUM OF ALL RESPONSES TO PHQ QUESTIONS 1-9: 6
6. FEELING BAD ABOUT YOURSELF - OR THAT YOU ARE A FAILURE OR HAVE LET YOURSELF OR YOUR FAMILY DOWN: 0
5. POOR APPETITE OR OVEREATING: 0
3. TROUBLE FALLING OR STAYING ASLEEP: 3
SUM OF ALL RESPONSES TO PHQ9 QUESTIONS 1 & 2: 0
2. FEELING DOWN, DEPRESSED OR HOPELESS: 0
SUM OF ALL RESPONSES TO PHQ QUESTIONS 1-9: 6
SUM OF ALL RESPONSES TO PHQ QUESTIONS 1-9: 6
1. LITTLE INTEREST OR PLEASURE IN DOING THINGS: 0
SUM OF ALL RESPONSES TO PHQ QUESTIONS 1-9: 6

## 2023-02-03 ASSESSMENT — ENCOUNTER SYMPTOMS
BLOOD IN STOOL: 0
COUGH: 0
SINUS PRESSURE: 0
DIARRHEA: 0
WHEEZING: 0
EYE PAIN: 0
EYE REDNESS: 0
ABDOMINAL PAIN: 1
VOMITING: 0
CHEST TIGHTNESS: 0
VOICE CHANGE: 0
RHINORRHEA: 0
SHORTNESS OF BREATH: 0
COLOR CHANGE: 0
SORE THROAT: 0
EYE DISCHARGE: 0

## 2023-02-03 NOTE — PROGRESS NOTES
Phil Funes is a 40 y.o. male who presents today for   Chief Complaint   Patient presents with    Other     Test testosterone    Abdominal Pain     RLQ, chronic       HPI  39 y/o WM here for f/u on chronic fatigue and to discuss blood work, including testosterone level and sleep study. He is still having chronic fatigue. His sleep study showed hypoxemia but no significant sleep apnea and his testosterone level was normal in December. He has had some recurrent RLQ pain that is dull and feels deep, which has been present for a year or more. He has no issues with constipation and has soft bowel movements 2x/day most of the time. No diarrhea and no blood in his stool. Pain occurs at least weekly. He still has his appendix. BMI Readings from Last 3 Encounters:   02/03/23 30.13 kg/m²   01/04/23 30.57 kg/m²   12/27/22 29.46 kg/m²     Wt Readings from Last 3 Encounters:   02/03/23 204 lb (92.5 kg)   01/04/23 207 lb (93.9 kg)   12/27/22 199 lb 8 oz (90.5 kg)        Review of Systems   Constitutional:  Positive for fatigue. Negative for appetite change, chills and fever. HENT:  Negative for ear pain, rhinorrhea, sinus pressure, sore throat and voice change. Eyes:  Negative for pain, discharge and redness. Respiratory:  Negative for cough, chest tightness, shortness of breath and wheezing. Cardiovascular:  Negative for chest pain and palpitations. Gastrointestinal:  Positive for abdominal pain. Negative for blood in stool, diarrhea and vomiting. Endocrine: Negative for cold intolerance, heat intolerance and polydipsia. Genitourinary:  Negative for dysuria and hematuria. Musculoskeletal:  Negative for arthralgias, myalgias, neck pain and neck stiffness. Skin:  Negative for color change and rash. Neurological:  Negative for dizziness, tremors, syncope, speech difficulty, weakness, numbness and headaches. Hematological:  Negative for adenopathy. Does not bruise/bleed easily. Psychiatric/Behavioral:  Negative for confusion, dysphoric mood and sleep disturbance. The patient is not nervous/anxious. All other systems reviewed and are negative. Past Medical History:   Diagnosis Date    Anxiety     Bilateral low back pain without sciatica 9/14/2018    Chronic back pain     Chronic lung disease of prematurity     Depression with anxiety     Gynecomastia 2003    right side    Insomnia     Perennial allergic rhinitis 8/28/2017    Premature infant of 30 weeks gestation     Birth Weight 3lbs 11 oz       Current Outpatient Medications   Medication Sig Dispense Refill    Semaglutide-Weight Management (WEGOVY) 0.25 MG/0.5ML SOAJ SC injection Inject 0.25 mg into the skin every 7 days 2 mL 1    omeprazole (PRILOSEC) 20 MG delayed release capsule Take 1 capsule by mouth 2 times daily (before meals) 60 capsule 2    tamsulosin (FLOMAX) 0.4 MG capsule Take 1 capsule by mouth at bedtime 90 capsule 3    albuterol sulfate HFA (PROAIR HFA) 108 (90 Base) MCG/ACT inhaler Inhale 2 puffs into the lungs every 4 hours as needed for Wheezing or Shortness of Breath 2 Inhaler 3    Multiple Vitamins-Minerals (THERAPEUTIC MULTIVITAMIN-MINERALS) tablet Take 1 tablet by mouth daily      Fexofenadine HCl (ALLEGRA PO) Take by mouth       No current facility-administered medications for this visit. Allergies   Allergen Reactions    Amoxicillin     Other Rash     STERI STRIPS    Pcn [Penicillins] Rash       Past Surgical History:   Procedure Laterality Date    CYST REMOVAL      MASTECTOMY Right 2003    due to gynecomastia       Social History     Tobacco Use    Smoking status: Never    Smokeless tobacco: Never   Vaping Use    Vaping Use: Never used   Substance Use Topics    Alcohol use:  Yes     Alcohol/week: 0.0 standard drinks     Comment: rarely    Drug use: No       Family History   Problem Relation Age of Onset    Heart Disease Paternal Grandfather     Prostate Cancer Father     Diabetes Daughter         Type I       /82   Pulse 87   Temp 98.4 °F (36.9 °C)   Ht 5' 9\" (1.753 m)   Wt 204 lb (92.5 kg)   SpO2 98%   BMI 30.13 kg/m²     Physical Exam  Vitals reviewed. Constitutional:       General: He is not in acute distress. Appearance: Normal appearance. He is well-developed and well-groomed. He is obese. He is not ill-appearing or toxic-appearing. HENT:      Head: Normocephalic and atraumatic. Right Ear: External ear normal.      Left Ear: External ear normal.      Nose: Nose normal.      Mouth/Throat:      Lips: Pink. Mouth: Mucous membranes are moist.   Eyes:      General:         Right eye: No discharge. Left eye: No discharge. Conjunctiva/sclera: Conjunctivae normal.      Pupils: Pupils are equal, round, and reactive to light. Neck:      Thyroid: No thyromegaly. Vascular: Normal carotid pulses. No carotid bruit or JVD. Trachea: Trachea and phonation normal. No tracheal tenderness. Cardiovascular:      Rate and Rhythm: Normal rate and regular rhythm. Pulses:           Carotid pulses are 2+ on the right side and 2+ on the left side. Posterior tibial pulses are 2+ on the right side and 2+ on the left side. Heart sounds: Normal heart sounds. No murmur heard. No friction rub. No gallop. Pulmonary:      Effort: Pulmonary effort is normal. No accessory muscle usage. Breath sounds: Normal breath sounds. No decreased breath sounds, wheezing, rhonchi or rales. Abdominal:      General: Bowel sounds are normal. There is no distension. Palpations: Abdomen is soft. There is no mass. Tenderness: There is abdominal tenderness in the right lower quadrant. There is no guarding or rebound. Negative signs include Rovsing's sign. Hernia: No hernia is present. Musculoskeletal:         General: No swelling, tenderness or deformity. Right wrist: Normal.      Left wrist: Normal.      Cervical back: Normal range of motion and neck supple. No rigidity. No muscular tenderness. Right lower leg: No edema. Left lower leg: No edema. Right ankle: Normal.      Left ankle: Normal.   Lymphadenopathy:      Cervical: No cervical adenopathy. Upper Body:      Right upper body: No supraclavicular adenopathy. Left upper body: No supraclavicular adenopathy. Skin:     General: Skin is warm. Capillary Refill: Capillary refill takes less than 2 seconds. Coloration: Skin is not cyanotic. Findings: No rash. Nails: There is no clubbing. Neurological:      Mental Status: He is alert and oriented to person, place, and time. Cranial Nerves: No cranial nerve deficit or dysarthria. Motor: No weakness, tremor or abnormal muscle tone. Coordination: Coordination normal.      Gait: Gait is intact. Comments: CN II-XII grossly intact, speech clear, no facial droop, MAEW   Psychiatric:         Attention and Perception: Attention and perception normal.         Mood and Affect: Mood and affect normal.         Speech: Speech normal.         Behavior: Behavior normal. Behavior is cooperative. Thought Content:  Thought content normal.         Cognition and Memory: Cognition and memory normal.         Judgment: Judgment normal.       Lab Results   Component Value Date    WBC 4.0 (L) 12/28/2022    HGB 15.8 12/28/2022    HCT 45.2 12/28/2022    MCV 86.3 12/28/2022     12/28/2022    LYMPHOPCT 44.0 (H) 12/28/2022    RBC 5.24 12/28/2022    MCH 30.2 12/28/2022    MCHC 35.0 12/28/2022    RDW 12.7 12/28/2022     Lab Results   Component Value Date     12/28/2022    K 3.8 12/28/2022     12/28/2022    CO2 25 12/28/2022    BUN 14 12/28/2022    CREATININE 0.9 12/28/2022    GLUCOSE 102 12/28/2022    CALCIUM 9.2 12/28/2022    PROT 6.8 12/28/2022    LABALBU 4.7 12/28/2022    BILITOT 0.4 12/28/2022    ALKPHOS 77 12/28/2022    AST 22 12/28/2022    ALT 28 12/28/2022    LABGLOM >60 12/28/2022    GFRAA >59 08/25/2022 AGRATIO 1.9 (H) 05/11/2020    GLOB 2.5 05/11/2020        Lab Results   Component Value Date    TSH 1.770 12/28/2022    T4FREE 1.27 12/28/2022     Lab Results   Component Value Date    CHOL 176 04/12/2022    CHOL 160 08/04/2021    CHOL 167 03/05/2020     Lab Results   Component Value Date    TRIG 73 04/12/2022    TRIG 94 08/04/2021    TRIG 83 03/05/2020     Lab Results   Component Value Date    HDL 55 04/12/2022    HDL 54 (L) 08/04/2021    HDL 47 (L) 03/05/2020     Lab Results   Component Value Date    LDLCALC 106 04/12/2022    LDLCALC 87 08/04/2021    LDLCALC 103 03/05/2020       Assessment:    ICD-10-CM    1. Obesity (BMI 30.0-34. 9)  E66.9 Semaglutide-Weight Management (WEGOVY) 0.25 MG/0.5ML SOAJ SC injection      2. Encounter for weight management  Z76.89 Semaglutide-Weight Management (WEGOVY) 0.25 MG/0.5ML SOAJ SC injection      3. Chronic fatigue  R53.82       4. Chronic right lower quadrant pain  R10.31 CT ABDOMEN PELVIS W WO CONTRAST Additional Contrast? Radiologist Recommendation    G89.29           Plan:  Alexandrea Alvarez was seen today for other and abdominal pain. Diagnoses and all orders for this visit:    Obesity (BMI 30.0-34.9)  -     Semaglutide-Weight Management (WEGOVY) 0.25 MG/0.5ML SOAJ SC injection; Inject 0.25 mg into the skin every 7 days    Encounter for weight management  -     Semaglutide-Weight Management (WEGOVY) 0.25 MG/0.5ML SOAJ SC injection; Inject 0.25 mg into the skin every 7 days    Chronic fatigue    Chronic right lower quadrant pain  -     CT ABDOMEN PELVIS W WO CONTRAST Additional Contrast? Radiologist Recommendation; Future      No new labs today. Previous labs reviewed. -Obesity due to excess caloric intake-not improved. Suspect chronic fatigue is related to obesity and nocturnal hypoxemia episodes without diagnostic sleep study for MAXIMILIAN recently. Will see if insurance will cover wegovy for weight loss efforts and see if fatigue improves with weight loss.  Encouraged efforts at low carbohydrate diet, exercise, and weight loss/efforts at normalizing BMI. -Chronic RLQ abdominal pain-schedule CT Abdomen & Pelvis with and without contrast to further evaluate.   -Return in about 3 months (around 5/3/2023) for weight management, weight check. Over 50% of the total visit time of 30 minutes was spent on counseling and/or coordination of care of:   1. Obesity (BMI 30.0-34.9)    2. Encounter for weight management    3. Chronic fatigue    4. Chronic right lower quadrant pain         Orders Placed This Encounter   Procedures    CT ABDOMEN PELVIS W WO CONTRAST Additional Contrast? Radiologist Recommendation     Standing Status:   Future     Number of Occurrences:   1     Standing Expiration Date:   2/3/2024     Order Specific Question:   Additional Contrast?     Answer:   Radiologist Recommendation     Order Specific Question:   STAT Creatinine as needed:     Answer:   Yes     Order Specific Question:   Reason for exam:     Answer:   chronic, recurrent RLQ pain       Orders Placed This Encounter   Medications    Semaglutide-Weight Management (WEGOVY) 0.25 MG/0.5ML SOAJ SC injection     Sig: Inject 0.25 mg into the skin every 7 days     Dispense:  2 mL     Refill:  1       There are no discontinued medications. There are no Patient Instructions on file for this visit. Patient voices understanding and agrees to plans along with risks and benefits of plan. Counseling:  Conor Veena Denny's case, medications and options were discussed in detail. patient was instructed to call the office if he   questions regarding him treatment. Should him conditions worsen, he should return to office to be reassessed by Dr. Jackie Brown. he  Should to go the closest Emergency Department for any emergency. They verbalized understanding the above instructions.

## 2023-02-13 ENCOUNTER — HOSPITAL ENCOUNTER (OUTPATIENT)
Dept: CT IMAGING | Age: 38
Discharge: HOME | End: 2023-02-13
Payer: COMMERCIAL

## 2023-02-13 DIAGNOSIS — G89.29 CHRONIC RIGHT LOWER QUADRANT PAIN: ICD-10-CM

## 2023-02-13 DIAGNOSIS — R10.31 CHRONIC RIGHT LOWER QUADRANT PAIN: ICD-10-CM

## 2023-02-13 PROCEDURE — 74178 CT ABD&PLV WO CNTR FLWD CNTR: CPT

## 2023-02-13 PROCEDURE — 6360000004 HC RX CONTRAST MEDICATION: Performed by: INTERNAL MEDICINE

## 2023-02-13 RX ADMIN — IOPAMIDOL 75 ML: 755 INJECTION, SOLUTION INTRAVENOUS at 15:58

## 2023-02-15 ENCOUNTER — OFFICE VISIT (OUTPATIENT)
Dept: UROLOGY | Age: 38
End: 2023-02-15
Payer: COMMERCIAL

## 2023-02-15 VITALS — WEIGHT: 205 LBS | TEMPERATURE: 98.6 F | BODY MASS INDEX: 35 KG/M2 | HEIGHT: 64 IN

## 2023-02-15 DIAGNOSIS — N13.8 BPH WITH OBSTRUCTION/LOWER URINARY TRACT SYMPTOMS: Primary | ICD-10-CM

## 2023-02-15 DIAGNOSIS — I86.1 LEFT VARICOCELE: ICD-10-CM

## 2023-02-15 DIAGNOSIS — N40.1 BPH WITH OBSTRUCTION/LOWER URINARY TRACT SYMPTOMS: Primary | ICD-10-CM

## 2023-02-15 DIAGNOSIS — Z91.89 AT RISK FOR INFERTILITY: ICD-10-CM

## 2023-02-15 LAB
APPEARANCE FLUID: CLEAR
BILIRUBIN, POC: NORMAL
BLOOD URINE, POC: NORMAL
CLARITY, POC: CLEAR
COLOR, POC: YELLOW
GLUCOSE URINE, POC: NORMAL
KETONES, POC: NORMAL
LEUKOCYTE EST, POC: NORMAL
NITRITE, POC: NORMAL
PH, POC: 6
PROTEIN, POC: NORMAL
SPECIFIC GRAVITY, POC: 1.02
UROBILINOGEN, POC: 0.2

## 2023-02-15 PROCEDURE — 81002 URINALYSIS NONAUTO W/O SCOPE: CPT | Performed by: NURSE PRACTITIONER

## 2023-02-15 PROCEDURE — 99214 OFFICE O/P EST MOD 30 MIN: CPT | Performed by: NURSE PRACTITIONER

## 2023-02-15 ASSESSMENT — ENCOUNTER SYMPTOMS
VOMITING: 0
BACK PAIN: 0
ABDOMINAL DISTENTION: 0
NAUSEA: 0
ABDOMINAL PAIN: 0

## 2023-02-15 NOTE — RESULT ENCOUNTER NOTE
Pt voiced understanding and awaiting call back on if we are going to refer or not sending this back as a reminder

## 2023-02-15 NOTE — PROGRESS NOTES
Saulo Alexander is a 40 y.o., male, Established patient who presents today   Chief Complaint   Patient presents with    Follow-up     I am here today for my 3 month follow up, wants a referral to a fertility clinic        HPI   Patient presents for follow-up of testicular pain and lower urinary tract symptoms. He reports no new issues with pain since his previous visit. He was noted to have bilateral epididymal cyst as well as a left-sided varicocele. He did well with treatment with Flexeril, then, and Flomax. He remains on the Flomax for treatment of his lower urinary tract symptoms which have also significantly improved with the initiation of alpha-blocker therapy. Patient is somewhat concerned about fertility. He reports he and his ex had unprotected sex for about 15 years resulting in only 1 pregnancy. His ex is currently with a new partner and has gotten pregnant within 1 month. REVIEW OF SYSTEMS:  Review of Systems   Constitutional:  Negative for chills and fever. Gastrointestinal:  Negative for abdominal distention, abdominal pain, nausea and vomiting. Genitourinary:  Negative for difficulty urinating, dysuria, flank pain, frequency, hematuria and urgency. Musculoskeletal:  Negative for back pain and gait problem. Psychiatric/Behavioral:  Negative for agitation and confusion. PHYSICAL EXAM:  Temp 98.6 °F (37 °C) (Temporal)   Ht 5' 4\" (1.626 m)   Wt 205 lb (93 kg)   BMI 35.19 kg/m²   Physical Exam  Vitals and nursing note reviewed. Constitutional:       General: He is not in acute distress. Appearance: Normal appearance. He is not ill-appearing. Pulmonary:      Effort: Pulmonary effort is normal. No respiratory distress. Abdominal:      General: There is no distension. Tenderness: There is no abdominal tenderness. There is no right CVA tenderness or left CVA tenderness. Neurological:      Mental Status: He is alert and oriented to person, place, and time.  Mental status is at baseline. Psychiatric:         Mood and Affect: Mood normal.         Behavior: Behavior normal.       DATA:  Results for orders placed or performed in visit on 02/15/23   POCT Urinalysis no Micro   Result Value Ref Range    Color, UA yellow     Clarity, UA clear     Glucose, UA POC neg     Bilirubin, UA neg     Ketones, UA neg     Spec Grav, UA 1.025     Blood, UA POC trace     pH, UA 6.0     Protein, UA POC neg     Urobilinogen, UA 0.2     Leukocytes, UA neg     Nitrite, UA neg     Appearance, Fluid Clear Clear, Slightly Cloudy       ASSESSMENT/PLAN  1. BPH with obstruction/lower urinary tract symptoms  Well maintained with Flomax. Does experience retrograde ejaculation.  - POCT Urinalysis no Micro    2. Left varicocele  No longer causing pain. Concern for infertility. 3. At risk for infertility  Will order a semen analysis and have him referred to a fertility specialist.  He is aware that he will need to stop alpha-blocker therapy about 3 weeks prior to his appointment in order to have an adequate volume of ejaculate.  - SEMEN ANALYSIS; Future      Orders Placed This Encounter   Procedures    SEMEN ANALYSIS     Standing Status:   Future     Standing Expiration Date:   2/15/2024    POCT Urinalysis no Micro        Return in about 1 year (around 2/15/2024). An electronic signature was used to authenticate this note. MITZI DOUGLASS - CNP    All information inputted into the note by the MA to include chief complaint, past medical history, past surgical history, medications, allergies, social and family history and review of systems has been reviewed and updated as needed by me. EMR Dragon/transcription disclaimer: Much of this document is electronic transcription/translation of spoken language to printed text. The electronic translation of spoken language may be erroneous or, at times, nonsensical words or phrases may be inadvertently transcribed.  Although I have reviewed the document for such errors, some may still exist.

## 2023-02-16 DIAGNOSIS — R93.3 ABNORMAL CT SCAN, COLON: ICD-10-CM

## 2023-02-16 DIAGNOSIS — R10.31 CHRONIC RIGHT LOWER QUADRANT PAIN: Primary | ICD-10-CM

## 2023-02-16 DIAGNOSIS — G89.29 CHRONIC RIGHT LOWER QUADRANT PAIN: Primary | ICD-10-CM

## 2023-03-03 ENCOUNTER — OFFICE VISIT (OUTPATIENT)
Dept: GASTROENTEROLOGY | Age: 38
End: 2023-03-03
Payer: COMMERCIAL

## 2023-03-03 VITALS
BODY MASS INDEX: 30.07 KG/M2 | OXYGEN SATURATION: 97 % | WEIGHT: 203 LBS | HEART RATE: 79 BPM | HEIGHT: 69 IN | DIASTOLIC BLOOD PRESSURE: 80 MMHG | SYSTOLIC BLOOD PRESSURE: 120 MMHG

## 2023-03-03 DIAGNOSIS — R10.84 GENERALIZED ABDOMINAL PAIN: Primary | ICD-10-CM

## 2023-03-03 DIAGNOSIS — R93.89 ABNORMAL CT SCAN: ICD-10-CM

## 2023-03-03 PROCEDURE — 99204 OFFICE O/P NEW MOD 45 MIN: CPT | Performed by: NURSE PRACTITIONER

## 2023-03-03 ASSESSMENT — ENCOUNTER SYMPTOMS
NAUSEA: 0
BLOOD IN STOOL: 0
CHOKING: 0
VOMITING: 0
RECTAL PAIN: 0
CONSTIPATION: 0
DIARRHEA: 0
ABDOMINAL DISTENTION: 0
ANAL BLEEDING: 0
ABDOMINAL PAIN: 1
SHORTNESS OF BREATH: 0
TROUBLE SWALLOWING: 0
COUGH: 0

## 2023-03-03 NOTE — PATIENT INSTRUCTIONS
Colonoscopy: Before Your Procedure  What is a colonoscopy? A colonoscopy is a test that lets a doctor look inside your colon. The doctor uses a thin, lighted tube called a colonoscope to look for problems. These include small growths called polyps, cancer, or bleeding. During the test, the doctor can take samples of tissue that can be checked for cancer or other problems. This is called a biopsy. The doctor can also take out polyps. Before the test, you will need to stop eating solid foods. You also will be given instructions on how to clean out your colon. This helps your doctor be able to see inside your colon during the test.  How do you prepare for the procedure? Procedures can be stressful. This information will help you understand what you can expect. And it will help you safely prepare for your procedure. Preparing for the procedure    Be sure you have someone to take you home. Anesthesia and pain medicine will make it unsafe for you to drive or get home on your own. Understand exactly what procedure is planned, along with the risks, benefits, and other options. Tell your doctor ALL the medicines, vitamins, supplements, and herbal remedies you take. Some may increase the risk of problems during your procedure. Your doctor will tell you if you should stop taking any of them before the procedure and how soon to do it. If you take a medicine that prevents blood clots, your doctor may tell you to stop taking it before your procedure. Or your doctor may tell you to keep taking it. (These medicines include aspirin and other blood thinners.) Make sure that you understand exactly what your doctor wants you to do. Make sure your doctor and the hospital have a copy of your advance directive. If you don't have one, you may want to prepare one. It lets others know your health care wishes. It's a good thing to have before any type of surgery or procedure.    Before the procedure    Follow your doctor's directions about when to stop eating solid foods and drink only clear liquids. You can drink water, clear juices, clear broths, flavored ice pops, and gelatin (such as Jell-O). Do not eat or drink anything red or purple. This includes grape juice and grape-flavored ice pops. It also includes fruit punch and cherry gelatin. Drink the \"colon prep\" liquid as your doctor tells you. You will want to stay home, because the liquid will make you go to the bathroom a lot. Your stools will be loose and watery. It's very important to drink all of the liquid. If you have problems drinking it, call your doctor. Do not eat any solid foods after you drink the colon prep. Stop drinking clear liquids for a few hours before the test. Your doctor will tell you how many hours this will be. What happens on the day of the procedure? Follow the instructions exactly about when to stop eating and drinking. If you don't, your procedure may be canceled. If your doctor told you to take your medicines on the day of the procedure, take them with only a sip of water. Take a bath or shower before you come in for your procedure. Do not apply lotions, perfumes, deodorants, or nail polish. Take off all jewelry and piercings. And take out contact lenses, if you wear them. At the [address] [place] [address] or [facility]   Bring a picture ID. You will be kept comfortable and safe by your anesthesia provider. The anesthesia may make you sleep. You will lie on your back or your side with your knees drawn up toward your belly. The doctor will gently put a gloved finger into your anus. Then the doctor puts the scope in and moves it into your colon. The scope goes in easily because it is lubricated. The doctor may also use small tools to take tissue samples for a biopsy or to remove polyps. This does not hurt. The test usually takes 30 to 45 minutes. But it may take longer. It depends on what is found and what is done. When should you call your doctor? You have questions or concerns. You don't understand how to prepare for your procedure. You are having trouble with the bowel prep. You become ill before the procedure (such as fever, flu, or a cold). You need to reschedule or have changed your mind about having the procedure. Where can you learn more? Go to http://www.pastrana.com/ and enter C315 to learn more about \"Colonoscopy: Before Your Procedure. \"  Current as of: May 4, 2022               Content Version: 13.5  © 8100-5368 [employer]. Care instructions adapted under license by [facility] ([facility]). If you have questions about a medical condition or this instruction, always ask your healthcare professional. [address] any warranty or liability for your use of this information.

## 2023-03-03 NOTE — PROGRESS NOTES
Subjective:     Patient ID: Yesi Casey is a 40 y.o. male  PCP: Vero Hawk MD  Referring Provider: Brenna Garcia,*    HPI  Patient presents to the office today with the following complaints: New Patient and Abdominal Pain      Patient seen in the office today with complaints of abd pain, he states the the pain is usually on the right side but it does hurt on the left at times too. Reports this pain has been going on for a couple of years. Reports he is taking prilosec BID and this is helping his reflux. Reports he is having normal bowel movements 1-2 times per day. He also had an abnormal CT scan which is listed below. CT Result (most recent):  CT ABDOMEN PELVIS W WO IV CONTRAST 02/13/2023    Narrative  Examination: CT of the abdomen and pelvis with and without IV contrast  Clinical history: Chronic RIGHT lower quadrant pain  Comparison: None  Technique: Helical CT imaging of the abdomen and pelvis was performed with and  without IV contrast administration. Images were reformatted in the axial,  sagittal, and coronal planes. Findings:  Liver: The liver is unremarkable in morphology. No focal liver lesion is seen. No biliary dilation is seen. Gallbladder: Unremarkable. Pancreas: Unremarkable. Spleen: Unremarkable. Adrenal glands: Unremarkable. Genitourinary tract: The kidneys and visualized portions of the ureters appear  unremarkable. No urinary tract calculi are seen. No hydronephrosis is seen. The  urinary bladder is decompressed which limits its evaluation. The prostate gland  is unremarkable. Gastrointestinal tract: The visualized hollow viscera demonstrate no focal  lesion. There is no evidence of bowel obstruction. Appendix: No findings to suggest acute appendicitis. Other findings: No free air or free fluid is identified. There is mild stranding  with borderline prominent lymph nodes within the mesenteric fat. .The abdominal  aorta and IVC appear unremarkable.   Bones and soft tissues: No acute osseous lesion is identified. The visualized  superficial soft tissues appear grossly unremarkable. Lower Thorax: The visualized lung bases are clear. Impression  Impression: 1. Mild stranding with borderline prominent lymph nodes within the mesenteric  fat. Findings may indicate mesenteric panniculitis or may be reactive in the  setting of enteritis. Please correlate clinically. 2.Otherwise, no acute abnormality is identified within the abdomen or pelvis. 3.Normal appendix. Assessment:     1. Generalized abdominal pain  2. Abnormal CT scan         Plan:   Schedule Colonoscopy  Instruct on bowel prep. Nothing to eat or drink after midnight the day of the exam.  Unable to drive for 24 hours after the procedure. No aspirin or nonsteroidal anti-inflammatories for 5 days before procedure. I have discussed the benefits, alternatives, and risks (including bleeding, perforation and death)  for pursuing Endoscopy (EGD/Colonscopy/EUS/ERCP) with the patient and they are willing to continue. We also discussed the need for anesthesia, IV access, proper dietary changes, medication changes if necessary, and need for bowel prep (if ordered) prior to their Endoscopic procedure. They are aware they must have someone accompany them to their scheduled procedure to drive them home - they agree to the above and are willing to continue. Orders  No orders of the defined types were placed in this encounter. Medications  No orders of the defined types were placed in this encounter.         Patient History:     Past Medical History:   Diagnosis Date    Anxiety     Bilateral low back pain without sciatica 09/14/2018    Chronic back pain     Chronic lung disease of prematurity     Depression with anxiety     Gynecomastia 2003    right side    Insomnia     Perennial allergic rhinitis 08/28/2017    Premature infant of 30 weeks gestation     Birth Weight 3lbs 11 oz       Past Surgical History:   Procedure Laterality Date    CYST REMOVAL      MASTECTOMY Right 2003    due to gynecomastia       Family History   Problem Relation Age of Onset    Prostate Cancer Father 39    Heart Disease Paternal Grandfather     Diabetes Daughter         Type I    Colon Polyps Neg Hx     Colon Cancer Neg Hx        Social History     Socioeconomic History    Marital status:     Number of children: 1   Occupational History    Occupation:    Tobacco Use    Smoking status: Never    Smokeless tobacco: Never   Vaping Use    Vaping Use: Never used   Substance and Sexual Activity    Alcohol use: Yes     Alcohol/week: 0.0 standard drinks     Comment: rarely    Drug use: No    Sexual activity: Yes     Partners: Female       Current Outpatient Medications   Medication Sig Dispense Refill    omeprazole (PRILOSEC) 20 MG delayed release capsule Take 1 capsule by mouth 2 times daily (before meals) 60 capsule 2    tamsulosin (FLOMAX) 0.4 MG capsule Take 1 capsule by mouth at bedtime 90 capsule 3    albuterol sulfate HFA (PROAIR HFA) 108 (90 Base) MCG/ACT inhaler Inhale 2 puffs into the lungs every 4 hours as needed for Wheezing or Shortness of Breath 2 Inhaler 3    Multiple Vitamins-Minerals (THERAPEUTIC MULTIVITAMIN-MINERALS) tablet Take 1 tablet by mouth daily      Fexofenadine HCl (ALLEGRA PO) Take by mouth       No current facility-administered medications for this visit. Allergies   Allergen Reactions    Amoxicillin     Other Rash     STERI STRIPS    Pcn [Penicillins] Rash       Review of Systems   Constitutional:  Negative for activity change, appetite change, fatigue, fever and unexpected weight change. HENT:  Negative for trouble swallowing. Respiratory:  Negative for cough, choking and shortness of breath. Cardiovascular:  Negative for chest pain. Gastrointestinal:  Positive for abdominal pain.  Negative for abdominal distention, anal bleeding, blood in stool, constipation, diarrhea, nausea, rectal pain and vomiting. Allergic/Immunologic: Negative for food allergies. All other systems reviewed and are negative. Objective:     /80 (Site: Left Upper Arm)   Pulse 79   Ht 5' 9\" (1.753 m)   Wt 203 lb (92.1 kg)   SpO2 97%   BMI 29.98 kg/m²     Physical Exam  Vitals reviewed. Constitutional:       General: He is not in acute distress. Appearance: He is well-developed. HENT:      Head: Normocephalic and atraumatic. Right Ear: External ear normal.      Left Ear: External ear normal.      Nose: Nose normal.   Eyes:      General: No scleral icterus. Right eye: No discharge. Left eye: No discharge. Conjunctiva/sclera: Conjunctivae normal.      Pupils: Pupils are equal, round, and reactive to light. Cardiovascular:      Rate and Rhythm: Normal rate and regular rhythm. Heart sounds: Normal heart sounds. No murmur heard. Pulmonary:      Effort: Pulmonary effort is normal. No respiratory distress. Breath sounds: Normal breath sounds. No wheezing or rales. Abdominal:      General: Bowel sounds are normal. There is no distension. Palpations: Abdomen is soft. There is no mass. Tenderness: There is abdominal tenderness. There is no guarding or rebound. Musculoskeletal:         General: Normal range of motion. Cervical back: Normal range of motion and neck supple. Skin:     General: Skin is warm and dry. Coloration: Skin is not pale. Neurological:      Mental Status: He is alert and oriented to person, place, and time.    Psychiatric:         Behavior: Behavior normal.

## 2023-03-10 ENCOUNTER — HOSPITAL ENCOUNTER (OUTPATIENT)
Age: 38
Setting detail: OUTPATIENT SURGERY
Discharge: HOME | End: 2023-03-10
Attending: INTERNAL MEDICINE | Admitting: INTERNAL MEDICINE
Payer: COMMERCIAL

## 2023-03-10 ENCOUNTER — ANESTHESIA (OUTPATIENT)
Dept: ENDOSCOPY | Age: 38
End: 2023-03-10
Payer: COMMERCIAL

## 2023-03-10 ENCOUNTER — ANESTHESIA EVENT (OUTPATIENT)
Dept: ENDOSCOPY | Age: 38
End: 2023-03-10
Payer: COMMERCIAL

## 2023-03-10 VITALS
HEART RATE: 63 BPM | RESPIRATION RATE: 16 BRPM | TEMPERATURE: 97 F | SYSTOLIC BLOOD PRESSURE: 119 MMHG | BODY MASS INDEX: 30.07 KG/M2 | HEIGHT: 69 IN | OXYGEN SATURATION: 98 % | WEIGHT: 203 LBS | DIASTOLIC BLOOD PRESSURE: 85 MMHG

## 2023-03-10 PROCEDURE — 3700000000 HC ANESTHESIA ATTENDED CARE: Performed by: INTERNAL MEDICINE

## 2023-03-10 PROCEDURE — 3700000001 HC ADD 15 MINUTES (ANESTHESIA): Performed by: INTERNAL MEDICINE

## 2023-03-10 PROCEDURE — 2580000003 HC RX 258: Performed by: INTERNAL MEDICINE

## 2023-03-10 PROCEDURE — 2580000003 HC RX 258: Performed by: NURSE ANESTHETIST, CERTIFIED REGISTERED

## 2023-03-10 PROCEDURE — 7100000011 HC PHASE II RECOVERY - ADDTL 15 MIN: Performed by: INTERNAL MEDICINE

## 2023-03-10 PROCEDURE — 3609027000 HC COLONOSCOPY: Performed by: INTERNAL MEDICINE

## 2023-03-10 PROCEDURE — 45378 DIAGNOSTIC COLONOSCOPY: CPT | Performed by: INTERNAL MEDICINE

## 2023-03-10 PROCEDURE — 6360000002 HC RX W HCPCS: Performed by: NURSE ANESTHETIST, CERTIFIED REGISTERED

## 2023-03-10 PROCEDURE — 7100000010 HC PHASE II RECOVERY - FIRST 15 MIN: Performed by: INTERNAL MEDICINE

## 2023-03-10 PROCEDURE — 2709999900 HC NON-CHARGEABLE SUPPLY: Performed by: INTERNAL MEDICINE

## 2023-03-10 PROCEDURE — 2500000003 HC RX 250 WO HCPCS: Performed by: NURSE ANESTHETIST, CERTIFIED REGISTERED

## 2023-03-10 RX ORDER — PROPOFOL 10 MG/ML
INJECTION, EMULSION INTRAVENOUS PRN
Status: DISCONTINUED | OUTPATIENT
Start: 2023-03-10 | End: 2023-03-10 | Stop reason: SDUPTHER

## 2023-03-10 RX ORDER — SODIUM CHLORIDE, SODIUM LACTATE, POTASSIUM CHLORIDE, CALCIUM CHLORIDE 600; 310; 30; 20 MG/100ML; MG/100ML; MG/100ML; MG/100ML
INJECTION, SOLUTION INTRAVENOUS CONTINUOUS
Status: DISCONTINUED | OUTPATIENT
Start: 2023-03-10 | End: 2023-03-10 | Stop reason: HOSPADM

## 2023-03-10 RX ORDER — LIDOCAINE HYDROCHLORIDE 10 MG/ML
INJECTION, SOLUTION EPIDURAL; INFILTRATION; INTRACAUDAL; PERINEURAL PRN
Status: DISCONTINUED | OUTPATIENT
Start: 2023-03-10 | End: 2023-03-10 | Stop reason: SDUPTHER

## 2023-03-10 RX ORDER — SODIUM CHLORIDE, SODIUM LACTATE, POTASSIUM CHLORIDE, CALCIUM CHLORIDE 600; 310; 30; 20 MG/100ML; MG/100ML; MG/100ML; MG/100ML
INJECTION, SOLUTION INTRAVENOUS CONTINUOUS PRN
Status: DISCONTINUED | OUTPATIENT
Start: 2023-03-10 | End: 2023-03-10 | Stop reason: SDUPTHER

## 2023-03-10 RX ADMIN — SODIUM CHLORIDE, POTASSIUM CHLORIDE, SODIUM LACTATE AND CALCIUM CHLORIDE: 600; 310; 30; 20 INJECTION, SOLUTION INTRAVENOUS at 08:31

## 2023-03-10 RX ADMIN — LIDOCAINE HYDROCHLORIDE 50 MG: 10 INJECTION, SOLUTION EPIDURAL; INFILTRATION; INTRACAUDAL; PERINEURAL at 10:11

## 2023-03-10 RX ADMIN — PROPOFOL 250 MG: 10 INJECTION, EMULSION INTRAVENOUS at 10:11

## 2023-03-10 RX ADMIN — SODIUM CHLORIDE, SODIUM LACTATE, POTASSIUM CHLORIDE, AND CALCIUM CHLORIDE: 600; 310; 30; 20 INJECTION, SOLUTION INTRAVENOUS at 10:01

## 2023-03-10 ASSESSMENT — PAIN SCALES - GENERAL: PAINLEVEL_OUTOF10: 0

## 2023-03-10 ASSESSMENT — PAIN - FUNCTIONAL ASSESSMENT: PAIN_FUNCTIONAL_ASSESSMENT: 0-10

## 2023-03-10 NOTE — ANESTHESIA POSTPROCEDURE EVALUATION
Department of Anesthesiology  Postprocedure Note    Patient: Rudolph Denny  MRN: [id number]  YOB: 1985  Date of evaluation: 3/10/2023      Procedure Summary     Date: 03/10/23 Room / Location: [place] [address] / [facility]    Anesthesia Start: 1001 Anesthesia Stop: 1022    Procedure: COLONOSCOPY DIAGNOSTIC Diagnosis:       Abdominal pain, unspecified abdominal location      Abnormal abdominal CT scan      (Abdominal pain, unspecified abdominal location [R10.9])      (Abnormal abdominal CT scan [R93.5])    Surgeons: Serge Hall MD Responsible Provider: MITZI Navarro CRNA    Anesthesia Type: general ASA Status: 1          Anesthesia Type: No value filed.    Stefano Phase I: Stefano Score: 10    Stefano Phase II:        Anesthesia Post Evaluation    Patient location during evaluation: bedside  Patient participation: complete - patient participated  Level of consciousness: sleepy but conscious  Pain score: 0  Airway patency: patent  Nausea & Vomiting: no nausea and no vomiting  Complications: no  Cardiovascular status: hemodynamically stable  Respiratory status: acceptable  Hydration status: euvolemic

## 2023-03-10 NOTE — ANESTHESIA PRE PROCEDURE
Department of Anesthesiology  Preprocedure Note       Name:  Dariana Appiah   Age:  40 y.o.  :  1985                                          MRN:  [id number]         Date:  3/10/2023      Surgeon:    Procedure:    Medications prior to admission:   Prior to Admission medications    Medication Sig Start Date End Date Taking? Authorizing Provider   omeprazole (PRILOSEC) 20 MG delayed release capsule Take 1 capsule by mouth 2 times daily (before meals) 23   Iram Tobar PA-C   tamsulosin (FLOMAX) 0.4 MG capsule Take 1 capsule by mouth at bedtime 11/15/22   Angeli Mckeon APRN - CNP   albuterol sulfate HFA (PROAIR HFA) 108 (90 Base) MCG/ACT inhaler Inhale 2 puffs into the lungs every 4 hours as needed for Wheezing or Shortness of Breath 18   Rosanna Olmos MD   Multiple Vitamins-Minerals (THERAPEUTIC MULTIVITAMIN-MINERALS) tablet Take 1 tablet by mouth daily    Historical Provider, MD   Fexofenadine HCl (ALLEGRA PO) Take by mouth    Historical Provider, MD       Current medications:    No current facility-administered medications for this visit. No current outpatient medications on file. Facility-Administered Medications Ordered in Other Visits   Medication Dose Route Frequency Provider Last Rate Last Admin    lactated ringers IV soln infusion   IntraVENous Continuous Tamar Card MD           Allergies: Allergies   Allergen Reactions    Amoxicillin     Other Rash     STERI STRIPS    Pcn [Penicillins] Rash       Problem List:    Patient Active Problem List   Diagnosis Code    Ganglion of right wrist M67.431    Idiopathic insomnia F51.01    Chronic fatigue R53.82    Asthma, mild intermittent, well-controlled J45.20    Perennial allergic rhinitis J30.89    Overweight (BMI 25.0-29. 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    Laryngopharyngeal reflux (LPR) K21.9    Globus sensation R09.89    History of COVID-19 Z86.16       Past Medical History:        Diagnosis Date    Anxiety     Bilateral low back pain without sciatica 09/14/2018    Chronic back pain     Chronic lung disease of prematurity     Depression with anxiety     Gynecomastia 2003    right side    Insomnia     Perennial allergic rhinitis 08/28/2017    Premature infant of 30 weeks gestation     Birth Weight 3lbs 11 oz       Past Surgical History:        Procedure Laterality Date    CYST REMOVAL      MASTECTOMY Right 2003    due to gynecomastia       Social History:    Social History     Tobacco Use    Smoking status: Never    Smokeless tobacco: Never   Substance Use Topics    Alcohol use: Yes     Alcohol/week: 0.0 standard drinks     Comment: rarely                                Counseling given: Not Answered      Vital Signs (Current): There were no vitals filed for this visit. BP Readings from Last 3 Encounters:   03/03/23 120/80   02/03/23 132/82   01/04/23 128/80       NPO Status:                                                                                 BMI:   Wt Readings from Last 3 Encounters:   03/03/23 203 lb (92.1 kg)   02/15/23 205 lb (93 kg)   02/03/23 204 lb (92.5 kg)     There is no height or weight on file to calculate BMI. Anesthesia Evaluation  Patient summary reviewed and Nursing notes reviewed  Airway: Mallampati: I          Dental:          Pulmonary:normal exam    (+) asthma:                            Cardiovascular:Negative CV ROS                      Neuro/Psych:               GI/Hepatic/Renal: Neg GI/Hepatic/Renal ROS            Endo/Other: Negative Endo/Other ROS                    Abdominal:             Vascular:           Other Findings:             Anesthesia Plan      general     ASA 1             Anesthetic plan and risks discussed with patient.                         Marleny Davis, APRN - CRNA   3/10/2023

## 2023-03-10 NOTE — H&P
Patient Name: Sakshi Hill  : 1985  MRN: [id number]  DATE: 03/10/23    Allergies: Allergies   Allergen Reactions    Amoxicillin     Other Rash     STERI STRIPS    Pcn [Penicillins] Rash        ENDOSCOPY  History and Physical    Procedure:    [x] Diagnostic Colonoscopy       [] Screening Colonoscopy  [] EGD      [] ERCP      [] EUS       [] Other    [x] Previous office notes/History and Physical reviewed from the patients chart. Please see EMR for further details of HPI. I have examined the patient's status immediately prior to the procedure and:      Indications/HPI:     1. Generalized abdominal pain  2. Abnormal CT scan-Mild stranding with borderline prominent lymph nodes within the mesenteric  fat. Findings may indicate mesenteric panniculitis or may be reactive in the  setting of enteritis     []Abdominal Pain   []Cancer- GI/Lung     []Fhx of colon CA/polyps  []History of Polyps  []Barretts            []Melena  []Abnormal Imaging              []Dysphagia              []Persistent Pneumonia   []Anemia                            []Food Impaction        []History of Polyps  [] GI Bleed             []Pulmonary nodule/Mass   []Change in bowel habits []Heartburn/Reflux  []Rectal Bleed (BRBPR)  []Chest Pain - Non Cardiac []Heme (+) Stool []Ulcers  []Constipation  []Hemoptysis  []Varices  []Diarrhea  []Hypoxemia    []Nausea/Vomiting   []Screening   []Crohns/Colitis  []Other:     Anesthesia:   [x] MAC [] Moderate Sedation   [] General   [] None     ROS: 12 pt Review of Symptoms was negative unless mentioned above    Medications:   Prior to Admission medications    Medication Sig Start Date End Date Taking?  Authorizing Provider   omeprazole (PRILOSEC) 20 MG delayed release capsule Take 1 capsule by mouth 2 times daily (before meals) 23   Luke Spence PA-C   tamsulosin (FLOMAX) 0.4 MG capsule Take 1 capsule by mouth at bedtime 11/15/22   MITZI Caro - CNP   albuterol sulfate HFA (PROAIR HFA) 108 (90 Base) MCG/ACT inhaler Inhale 2 puffs into the lungs every 4 hours as needed for Wheezing or Shortness of Breath 9/14/18   Mikael Navarrete MD   Multiple Vitamins-Minerals (THERAPEUTIC MULTIVITAMIN-MINERALS) tablet Take 1 tablet by mouth daily    Historical Provider, MD   Fexofenadine HCl (ALLEGRA PO) Take by mouth daily    Historical Provider, MD       Past Medical History:  Past Medical History:   Diagnosis Date    Anxiety     Asthma     Bilateral low back pain without sciatica 09/14/2018    Chronic back pain     Chronic lung disease of prematurity     Depression with anxiety     Gynecomastia 2003    right side    Insomnia     Perennial allergic rhinitis 08/28/2017    Premature infant of 30 weeks gestation     Birth Weight 3lbs 11 oz       Past Surgical History:  Past Surgical History:   Procedure Laterality Date    CYST REMOVAL      MASTECTOMY Right 2003    due to gynecomastia       Social History:  Social History     Tobacco Use    Smoking status: Never    Smokeless tobacco: Never   Vaping Use    Vaping Use: Never used   Substance Use Topics    Alcohol use: Yes     Alcohol/week: 0.0 standard drinks     Comment: rarely    Drug use: No       Vital Signs:   Vitals:    03/10/23 0828   BP: 120/77   Pulse: 65   Resp: 16   Temp: 97.6 °F (36.4 °C)   SpO2: 96%        Physical Exam:  Cardiac:  [x]WNL  []Comments:  Pulmonary:  [x]WNL   []Comments:  Neuro/Mental Status:  [x]WNL  []Comments:  Abdominal:  [x]WNL    []Comments:  Other:   []WNL  []Comments:    Informed Consent:  The risks and benefits of the procedure have been discussed with either the patient or if they cannot consent, their representative. Assessment:  Patient examined and appropriate for planned sedation and procedure. Plan:  Proceed with planned sedation and procedure as above.          Aimee Schafer MD

## 2023-03-10 NOTE — OP NOTE
Patient: Chayo Sarkar : 1985  [facility] Rec#: [id number] Acc#: [id number]   Primary Care Provider Driss Rodriguez MD    Date of Procedure:  3/10/2023    Endoscopist: Sami Montana MD, MD    Referring Provider: Driss Rodriguez MD,     Operation Performed: Colonoscopy up to the terminal ileum for up to 15 cm from the ileocecal valve    Indications:     1. Generalized abdominal pain  2. Abnormal CT scan-Mild stranding with borderline prominent lymph nodes within the mesenteric  fat. Findings may indicate mesenteric panniculitis or may be reactive in the  setting of enteritis    Anesthesia:  Sedation was administered by anesthesia who monitored the patient during the procedure. I met with Chayo Sarkar prior to procedure. We discussed the procedure itself, and I have discussed the risks of endoscopy (including-- but not limited to-- pain, discomfort, bleeding potentially requiring second endoscopic procedure and/or blood transfusion, organ perforation requiring operative repair, damage to organs near the colon, infection, aspiration, cardiopulmonary/allergic reaction), benefits, indications to endoscopy. Additionally, we discussed options other than colonoscopy. The patient expressed understanding. All questions answered. The patient decided to proceed with the procedure. Signed informed consent was placed on the chart. Blood Loss: minimal    Withdrawal time: More than 9 minutes  Bowel Prep: adequate     Complications: no immediate complications    DESCRIPTION OF PROCEDURE:     A time out was performed. After written informed consent was obtained, the patient was placed in the left lateral position. The perianal area was inspected, and a digital rectal exam was performed. A rectal exam was performed: normal tone, no palpable lesions.  At this point, a forward viewing Olympus colonoscope was inserted into the anus and carefully advanced to the terminal ileum for up to 15 cm from the ileocecal valve. The cecum was identified by the ileocecal valve and the appendiceal orifice. The colonoscope was then slowly withdrawn with careful inspection of the mucosa in a linear and circumferential fashion. The scope was retroflexed in the rectum. Suction was utilized during the procedure to remove as much air as possible from the bowel. The colonoscope was removed from the patient, and the procedure was terminated. Findings are listed below. Findings: The mucosa appeared normal throughout the entire examined colon and examined terminal ileum. NO large polyps or masses or strictures or colitis or ileitis or overt IBD. Internal hemorrhoids-Grade 1  Retroflexion in the rectum was otherwise normal and revealed no further abnormalities    No clear-cut lesions to explain the patient's abdominal symptoms or findings on CT scan were elicited on this exam.  IBS and non-GI etiology should be in the differential diagnosis    Recommendations:  1. Repeat colonoscopy: At age 39 to begin colorectal cancer screening; sooner if his personal or family history as pertaining to colorectal cancer risk changes requiring an earlier exam or if the patient were to develop lower GI symptoms such as bleeding, abdominal pain, change in bowel habits or stool caliber or if the patient has anemia or unexplained weight loss in the future. 2.    - Resume previous meds and diet  - GI clinic f/u 8 weeks with Ms. Sami Beaulieu  - Keep scheduled f/u appts with other MDs     Findings and recommendations were discussed w/ the patient. A copy of the images was provided.     (Please note that portions of this note were completed with a voice recognition program. Efforts were made to edit the dictations but occasionally words may be mis-transcribed.)     Angela Delgado MD, MD  3/10/2023  10:06 AM

## 2023-03-10 NOTE — DISCHARGE INSTRUCTIONS
1. Repeat colonoscopy: At age 39 to begin colorectal cancer screening; sooner if his personal or family history as pertaining to colorectal cancer risk changes requiring an earlier exam or if the patient were to develop lower GI symptoms such as bleeding, abdominal pain, change in bowel habits or stool caliber or if the patient has anemia or unexplained weight loss in the future. 2.    - Resume previous meds and diet  - GI clinic f/u 8 weeks with Ms. Pat Martins  - Keep scheduled f/u appts with other MDs        Colonoscopy: What to Expect at [address] [place] [address]  After a colonoscopy, you'll stay at the clinic until you wake up. Then you can go home. But you'll need to arrange for a ride. Your doctor will tell you when you can eat and do your other usual activities. Your doctor will talk to you about when you'll need your next colonoscopy. Your doctor can help you decide how often you need to be checked. This will depend on the results of your test and your risk for colorectal cancer. After the test, you may be bloated or have gas pains. You may need to pass gas. If a biopsy was done or a polyp was removed, you may have streaks of blood in your stool (feces) for a few days. Problems such as heavy rectal bleeding may not occur until several weeks after the test. This isn't common. But it can happen after polyps are removed. This care sheet gives you a general idea about how long it will take for you to recover. But each person recovers at a different pace. Follow the steps below to get better as quickly as possible. How can you care for yourself at home? Activity    Rest when you feel tired. You can do your normal activities when it feels okay to do so. Diet    Follow your doctor's directions for eating. Unless your doctor has told you not to, drink plenty of fluids. This helps to replace the fluids that were lost during the colon prep. Do not drink alcohol.    Medicines    Your doctor will tell you if and when you can restart your medicines. You will also be given instructions about taking any new medicines. If you stopped taking aspirin or some other blood thinner, your doctor will tell you when to start taking it again. If polyps were removed or a biopsy was done during the test, your doctor may tell you not to take aspirin or other anti-inflammatory medicines for a few days. These include ibuprofen (Advil, Motrin) and naproxen (Aleve). Other instructions    For your safety, do not drive or operate machinery until the medicine wears off and you can think clearly. Your doctor may tell you not to drive or operate machinery until the day after your test.     Do not sign legal documents or make major decisions until the medicine wears off and you can think clearly. The anesthesia can make it hard for you to fully understand what you are agreeing to. Follow-up care is a key part of your treatment and safety. Be sure to make and go to all appointments, and call your doctor if you are having problems. It's also a good idea to know your test results and keep a list of the medicines you take. When should you call for help? Call 911 anytime you think you may need emergency care. For example, call if:    You passed out (lost consciousness). You pass maroon or bloody stools. You have trouble breathing. Call your doctor now or seek immediate medical care if:    You have pain that does not get better after you take pain medicine. You are sick to your stomach or cannot drink fluids. You have new or worse belly pain. You have blood in your stools. You have a fever. You cannot pass stools or gas. Watch closely for changes in your health, and be sure to contact your doctor if you have any problems. Where can you learn more? Go to http://www.woods.com/ and enter E264 to learn more about \"Colonoscopy: What to Expect at Home. \"  Current as of:  May 4, 2022 Content Version: 13.5  © 2573-0384 [employer]. Care instructions adapted under license by [facility] ([facility]). If you have questions about a medical condition or this instruction, always ask your healthcare professional. [address] any warranty or liability for your use of this information.

## 2023-03-16 ENCOUNTER — OFFICE VISIT (OUTPATIENT)
Dept: ENT CLINIC | Age: 38
End: 2023-03-16
Payer: COMMERCIAL

## 2023-03-16 VITALS
DIASTOLIC BLOOD PRESSURE: 76 MMHG | BODY MASS INDEX: 30.07 KG/M2 | WEIGHT: 203 LBS | HEIGHT: 69 IN | SYSTOLIC BLOOD PRESSURE: 130 MMHG

## 2023-03-16 DIAGNOSIS — K21.9 LARYNGOPHARYNGEAL REFLUX (LPR): Primary | ICD-10-CM

## 2023-03-16 DIAGNOSIS — B37.0 ORAL THRUSH: ICD-10-CM

## 2023-03-16 PROCEDURE — 99213 OFFICE O/P EST LOW 20 MIN: CPT | Performed by: PHYSICIAN ASSISTANT

## 2023-03-16 PROCEDURE — 31575 DIAGNOSTIC LARYNGOSCOPY: CPT | Performed by: PHYSICIAN ASSISTANT

## 2023-03-16 RX ORDER — FLUCONAZOLE 100 MG/1
100 TABLET ORAL DAILY
Qty: 14 TABLET | Refills: 0 | Status: SHIPPED | OUTPATIENT
Start: 2023-03-16 | End: 2023-03-30

## 2023-03-16 RX ORDER — OMEPRAZOLE 20 MG/1
20 CAPSULE, DELAYED RELEASE ORAL DAILY
Qty: 30 CAPSULE | Refills: 2 | Status: SHIPPED | OUTPATIENT
Start: 2023-03-16

## 2023-03-16 ASSESSMENT — ENCOUNTER SYMPTOMS
SORE THROAT: 0
PHOTOPHOBIA: 0
SINUS PAIN: 0
RHINORRHEA: 0
SINUS PRESSURE: 0
TROUBLE SWALLOWING: 0
VOICE CHANGE: 0
EYE PAIN: 0
FACIAL SWELLING: 0

## 2023-03-16 NOTE — ASSESSMENT & PLAN NOTE
Clinically resolving LPR based on today's laryngoscopy  Plan: I recommended decreasing his omeprazole to once a day. He is to follow-up in 1 month for reevaluation.

## 2023-03-16 NOTE — ASSESSMENT & PLAN NOTE
Persistent oral thrush to the posterior surface of the tongue noted today on laryngoscopy  Plan: I will add Diflucan for 2 weeks as well as Mycostatin swish and swallow. We will reassess in 1 month.

## 2023-03-16 NOTE — PROGRESS NOTES
[facility] OTOLARYNGOLOGY/ENT  Pearl Lucas is a pleasant 70-year-old  male that presents for a 6-week follow-up after treatment for LPR and oral thrush. Patient reports that he feels much better with no clearing of the throat or any evidence of a globus sensation. He reports he is eating well with no dysphagia. Also denies any vocal hoarseness. He has tolerated the PPI therapy well with no issues. Allergies: Amoxicillin, Other, and Pcn [penicillins]      Current Outpatient Medications   Medication Sig Dispense Refill    fluconazole (DIFLUCAN) 100 MG tablet Take 1 tablet by mouth daily for 14 days 14 tablet 0    nystatin (MYCOSTATIN) 678259 UNIT/ML suspension Take 5 mLs by mouth 4 times daily for 10 days Gargle for 2 to 3 minutes and then swallow 200 mL 0    omeprazole (PRILOSEC) 20 MG delayed release capsule Take 1 capsule by mouth Daily 30 capsule 2    tamsulosin (FLOMAX) 0.4 MG capsule Take 1 capsule by mouth at bedtime 90 capsule 3    albuterol sulfate HFA (PROAIR HFA) 108 (90 Base) MCG/ACT inhaler Inhale 2 puffs into the lungs every 4 hours as needed for Wheezing or Shortness of Breath 2 Inhaler 3    Multiple Vitamins-Minerals (THERAPEUTIC MULTIVITAMIN-MINERALS) tablet Take 1 tablet by mouth daily      Fexofenadine HCl (ALLEGRA PO) Take by mouth daily       No current facility-administered medications for this visit.        Past Surgical History:   Procedure Laterality Date    COLONOSCOPY N/A 03/10/2023    Dr Mary Santos hemorrhoids-Grade 1, 7 yr (age 39) recall    CYST REMOVAL      MASTECTOMY Right 2003    due to gynecomastia       Past Medical History:   Diagnosis Date    Anxiety     Asthma     Bilateral low back pain without sciatica 09/14/2018    Chronic back pain     Chronic lung disease of prematurity     Depression with anxiety     Gynecomastia 2003    right side    Insomnia     Perennial allergic rhinitis 08/28/2017    Premature infant of 30 weeks gestation     Birth Weight 3lbs 11 oz       Family History   Problem Relation Age of Onset    Prostate Cancer Father 39    Heart Disease Paternal Grandfather     Diabetes Daughter         Type I    Colon Polyps Neg Hx     Colon Cancer Neg Hx        Social History     Tobacco Use    Smoking status: Never    Smokeless tobacco: Never   Substance Use Topics    Alcohol use: Yes     Alcohol/week: 0.0 standard drinks     Comment: rarely           REVIEW OF SYSTEMS:  all other systems reviewed and are negative  Review of Systems   Constitutional:  Negative for chills and fever. HENT:  Negative for congestion, dental problem, ear discharge, ear pain, facial swelling, hearing loss, postnasal drip, rhinorrhea, sinus pressure, sinus pain, sore throat, tinnitus, trouble swallowing and voice change. Eyes:  Negative for photophobia and pain. Neurological:  Negative for dizziness and headaches. Comments:     PHYSICAL EXAM:    /76   Ht 5' 9\" (1.753 m)   Wt 203 lb (92.1 kg)   BMI 29.98 kg/m²   Body mass index is 29.98 kg/m². General Appearance: well developed  and well nourished  Head/ Face: normocephalic and atraumatic  Vocal Quality: good/ normal  Ears: Right Ear: External: external ears normal Otoscopy Ear Canal: canal clear Otoscopy TM: TM's normal and TM's mobile Left Ear: External: external ears normal Otoscopy Ear Canal: canal clear Otoscopy TM: TM's normal and TM's mobile  Hearing: grossly intact  Nose: see endoscopy report  Neck: supple and adenopathy none palpable  Thyroid: normal  Oral exam demonstrated the tongue to be midline with no obvious evidence of thrush to the anterior surface of the tongue. The posterior pharynx appeared to be normal.    Assessment & Plan:    Problem List Items Addressed This Visit       Laryngopharyngeal reflux (LPR) - Primary     Clinically resolving LPR based on today's laryngoscopy  Plan: I recommended decreasing his omeprazole to once a day. He is to follow-up in 1 month for reevaluation. Relevant Orders    DE LARYNGOSCOPY FLEXIBLE DIAGNOSTIC (Completed)    RESOLVED: Oral thrush     Persistent oral thrush to the posterior surface of the tongue noted today on laryngoscopy  Plan: I will add Diflucan for 2 weeks as well as Mycostatin swish and swallow. We will reassess in 1 month. Orders Placed This Encounter   Procedures    DE LARYNGOSCOPY FLEXIBLE DIAGNOSTIC     The nares were anesthetized with 4% lidocaine aerosol bilaterally. Each nare was individually evaluated where the patient was found to have engorged turbinates with no evidence of nasal polyps. Due to obstruction the left side was utilized for the full procedure. The posterior wall of the nasopharyngeal region demonstrated postnasal drip to be present with no additional findings noted. The scope was advanced to the oropharyngeal region where the patient was found to have pooling mucus present with no ulcerations or masses. He was noted with normal swallowing. The scope was then advanced to the epiglottis where the vocal cords were well visualized. The vocal cords were much improved with no edema and resolving erythema. There was no nodularities present. The vocal cords were symmetrical and fully functional.  No masses were noted at this level. The tongue was extended and demonstrated evidence of thrush still to be present that is located more to the mid tongue region. No additional findings was noted. The patient tolerated the procedure nicely with no complications.        Orders Placed This Encounter   Medications    fluconazole (DIFLUCAN) 100 MG tablet     Sig: Take 1 tablet by mouth daily for 14 days     Dispense:  14 tablet     Refill:  0    nystatin (MYCOSTATIN) 614558 UNIT/ML suspension     Sig: Take 5 mLs by mouth 4 times daily for 10 days Gargle for 2 to 3 minutes and then swallow     Dispense:  200 mL     Refill:  0    omeprazole (PRILOSEC) 20 MG delayed release capsule     Sig: Take 1 capsule by mouth Daily Dispense:  30 capsule     Refill:  2       Electronically signed by Meghan Miller PA-C on 3/16/23 at 9:48 AM CDT        Please note that this chart was generated using dragon dictation software. Although every effort was made to ensure the accuracy of this automated transcription, some errors in transcription may have occurred.

## 2023-03-17 DIAGNOSIS — J45.20 ASTHMA, MILD INTERMITTENT, WELL-CONTROLLED: ICD-10-CM

## 2023-03-20 RX ORDER — ALBUTEROL SULFATE 90 UG/1
2 AEROSOL, METERED RESPIRATORY (INHALATION) EVERY 4 HOURS PRN
Qty: 2 EACH | Refills: 3 | Status: SHIPPED | OUTPATIENT
Start: 2023-03-20

## 2023-03-20 NOTE — TELEPHONE ENCOUNTER
Anna Pedro called to request a refill on his medication.       Last office visit : 2/3/2023   Next office visit : 5/3/2023     Requested Prescriptions     Pending Prescriptions Disp Refills    albuterol sulfate HFA (PROAIR HFA) 108 (90 Base) MCG/ACT inhaler 2 each 3     Sig: Inhale 2 puffs into the lungs every 4 hours as needed for Wheezing or Shortness of Breath            Koko Mcclelland MA

## 2023-05-07 DIAGNOSIS — N13.8 BPH WITH OBSTRUCTION/LOWER URINARY TRACT SYMPTOMS: ICD-10-CM

## 2023-05-07 DIAGNOSIS — N40.1 BPH WITH OBSTRUCTION/LOWER URINARY TRACT SYMPTOMS: ICD-10-CM

## 2023-05-08 RX ORDER — TAMSULOSIN HYDROCHLORIDE 0.4 MG/1
0.4 CAPSULE ORAL NIGHTLY
Qty: 90 CAPSULE | Refills: 3 | Status: SHIPPED | OUTPATIENT
Start: 2023-05-08

## 2023-05-08 RX ORDER — OMEPRAZOLE 20 MG/1
20 CAPSULE, DELAYED RELEASE ORAL DAILY
Qty: 30 CAPSULE | Refills: 2 | Status: SHIPPED | OUTPATIENT
Start: 2023-05-08

## 2023-10-11 DIAGNOSIS — N40.1 BPH WITH OBSTRUCTION/LOWER URINARY TRACT SYMPTOMS: ICD-10-CM

## 2023-10-11 DIAGNOSIS — J45.20 ASTHMA, MILD INTERMITTENT, WELL-CONTROLLED: ICD-10-CM

## 2023-10-11 DIAGNOSIS — N13.8 BPH WITH OBSTRUCTION/LOWER URINARY TRACT SYMPTOMS: ICD-10-CM

## 2023-10-11 RX ORDER — TAMSULOSIN HYDROCHLORIDE 0.4 MG/1
0.4 CAPSULE ORAL NIGHTLY
Qty: 90 CAPSULE | Refills: 3 | OUTPATIENT
Start: 2023-10-11

## 2023-10-11 RX ORDER — OMEPRAZOLE 20 MG/1
20 CAPSULE, DELAYED RELEASE ORAL DAILY
Qty: 30 CAPSULE | Refills: 2 | Status: SHIPPED | OUTPATIENT
Start: 2023-10-11

## 2023-10-11 RX ORDER — ALBUTEROL SULFATE 90 UG/1
2 AEROSOL, METERED RESPIRATORY (INHALATION) EVERY 4 HOURS PRN
Qty: 2 EACH | Refills: 3 | Status: SHIPPED | OUTPATIENT
Start: 2023-10-11

## 2023-10-11 NOTE — TELEPHONE ENCOUNTER
Jarvisbaudilio Casillas called to request a refill on his medication.       Last office visit : 2/3/2023   Next office visit : 11/16/2023     Requested Prescriptions     Pending Prescriptions Disp Refills    albuterol sulfate HFA (PROAIR HFA) 108 (90 Base) MCG/ACT inhaler 2 each 3     Sig: Inhale 2 puffs into the lungs every 4 hours as needed for Wheezing or Shortness of Breath            Ramos Landeros MA

## 2023-11-13 DIAGNOSIS — Z13.29 SCREENING FOR THYROID DISORDER: ICD-10-CM

## 2023-11-13 DIAGNOSIS — R53.82 CHRONIC FATIGUE: Primary | ICD-10-CM

## 2023-11-13 DIAGNOSIS — Z13.0 SCREENING FOR DEFICIENCY ANEMIA: ICD-10-CM

## 2023-11-13 DIAGNOSIS — Z13.220 SCREENING FOR HYPERLIPIDEMIA: ICD-10-CM

## 2023-11-13 DIAGNOSIS — E55.9 VITAMIN D DEFICIENCY: Primary | ICD-10-CM

## 2023-11-13 DIAGNOSIS — Z13.1 SCREENING FOR DIABETES MELLITUS: ICD-10-CM

## 2023-11-14 DIAGNOSIS — Z13.220 SCREENING FOR HYPERLIPIDEMIA: ICD-10-CM

## 2023-11-14 DIAGNOSIS — Z13.29 SCREENING FOR THYROID DISORDER: ICD-10-CM

## 2023-11-14 DIAGNOSIS — R53.82 CHRONIC FATIGUE: ICD-10-CM

## 2023-11-14 DIAGNOSIS — Z13.1 SCREENING FOR DIABETES MELLITUS: ICD-10-CM

## 2023-11-14 DIAGNOSIS — Z13.0 SCREENING FOR DEFICIENCY ANEMIA: ICD-10-CM

## 2023-11-14 LAB
ALBUMIN SERPL-MCNC: 4.6 G/DL (ref 3.5–5.2)
ALP SERPL-CCNC: 74 U/L (ref 40–130)
ALT SERPL-CCNC: 38 U/L (ref 5–41)
ANION GAP SERPL CALCULATED.3IONS-SCNC: 10 MMOL/L (ref 7–19)
AST SERPL-CCNC: 24 U/L (ref 5–40)
BASOPHILS # BLD: 0 K/UL (ref 0–0.2)
BASOPHILS NFR BLD: 1 % (ref 0–1)
BILIRUB SERPL-MCNC: 0.9 MG/DL (ref 0.2–1.2)
BUN SERPL-MCNC: 13 MG/DL (ref 6–20)
CALCIUM SERPL-MCNC: 9.4 MG/DL (ref 8.6–10)
CHLORIDE SERPL-SCNC: 103 MMOL/L (ref 98–111)
CHOLEST SERPL-MCNC: 176 MG/DL (ref 160–199)
CO2 SERPL-SCNC: 26 MMOL/L (ref 22–29)
CREAT SERPL-MCNC: 1.1 MG/DL (ref 0.5–1.2)
EOSINOPHIL # BLD: 0.1 K/UL (ref 0–0.6)
EOSINOPHIL NFR BLD: 1.7 % (ref 0–5)
ERYTHROCYTE [DISTWIDTH] IN BLOOD BY AUTOMATED COUNT: 12.5 % (ref 11.5–14.5)
GLUCOSE SERPL-MCNC: 108 MG/DL (ref 74–109)
HCT VFR BLD AUTO: 47.8 % (ref 42–52)
HDLC SERPL-MCNC: 41 MG/DL (ref 55–121)
HGB BLD-MCNC: 16.5 G/DL (ref 14–18)
IMM GRANULOCYTES # BLD: 0 K/UL
LDLC SERPL CALC-MCNC: 115 MG/DL
LYMPHOCYTES # BLD: 1.5 K/UL (ref 1.1–4.5)
LYMPHOCYTES NFR BLD: 35.8 % (ref 20–40)
MCH RBC QN AUTO: 29.8 PG (ref 27–31)
MCHC RBC AUTO-ENTMCNC: 34.5 G/DL (ref 33–37)
MCV RBC AUTO: 86.4 FL (ref 80–94)
MONOCYTES # BLD: 0.4 K/UL (ref 0–0.9)
MONOCYTES NFR BLD: 8.5 % (ref 0–10)
NEUTROPHILS # BLD: 2.2 K/UL (ref 1.5–7.5)
NEUTS SEG NFR BLD: 53 % (ref 50–65)
PLATELET # BLD AUTO: 255 K/UL (ref 130–400)
PMV BLD AUTO: 10.4 FL (ref 9.4–12.4)
POTASSIUM SERPL-SCNC: 4 MMOL/L (ref 3.5–5)
PROT SERPL-MCNC: 7.2 G/DL (ref 6.6–8.7)
RBC # BLD AUTO: 5.53 M/UL (ref 4.7–6.1)
SODIUM SERPL-SCNC: 139 MMOL/L (ref 136–145)
T4 FREE SERPL-MCNC: 1.36 NG/DL (ref 0.93–1.7)
TRIGL SERPL-MCNC: 98 MG/DL (ref 0–149)
TSH SERPL DL<=0.005 MIU/L-ACNC: 2.38 UIU/ML (ref 0.27–4.2)
WBC # BLD AUTO: 4.1 K/UL (ref 4.8–10.8)

## 2023-11-16 ENCOUNTER — OFFICE VISIT (OUTPATIENT)
Dept: PRIMARY CARE CLINIC | Age: 38
End: 2023-11-16
Payer: COMMERCIAL

## 2023-11-16 VITALS
OXYGEN SATURATION: 99 % | BODY MASS INDEX: 30.27 KG/M2 | WEIGHT: 205 LBS | HEART RATE: 81 BPM | SYSTOLIC BLOOD PRESSURE: 120 MMHG | DIASTOLIC BLOOD PRESSURE: 78 MMHG | TEMPERATURE: 97.9 F

## 2023-11-16 DIAGNOSIS — Z00.00 ENCOUNTER FOR WELL ADULT EXAM WITHOUT ABNORMAL FINDINGS: Primary | ICD-10-CM

## 2023-11-16 DIAGNOSIS — E66.09 CLASS 1 OBESITY DUE TO EXCESS CALORIES WITHOUT SERIOUS COMORBIDITY WITH BODY MASS INDEX (BMI) OF 30.0 TO 30.9 IN ADULT: ICD-10-CM

## 2023-11-16 DIAGNOSIS — Z91.018 FOOD ALLERGY: ICD-10-CM

## 2023-11-16 DIAGNOSIS — Z13.1 SCREENING FOR DIABETES MELLITUS: ICD-10-CM

## 2023-11-16 DIAGNOSIS — F32.5 MAJOR DEPRESSION IN REMISSION (HCC): ICD-10-CM

## 2023-11-16 PROBLEM — M54.9 CHRONIC BACK PAIN GREATER THAN 3 MONTHS DURATION: Status: RESOLVED | Noted: 2022-09-22 | Resolved: 2023-11-16

## 2023-11-16 PROBLEM — M62.830 SPASM OF MUSCLE OF LOWER BACK: Status: RESOLVED | Noted: 2022-09-22 | Resolved: 2023-11-16

## 2023-11-16 PROBLEM — Z86.16 HISTORY OF COVID-19: Status: RESOLVED | Noted: 2022-12-27 | Resolved: 2023-11-16

## 2023-11-16 PROBLEM — E66.811 CLASS 1 OBESITY DUE TO EXCESS CALORIES WITHOUT SERIOUS COMORBIDITY WITH BODY MASS INDEX (BMI) OF 30.0 TO 30.9 IN ADULT: Status: ACTIVE | Noted: 2023-11-16

## 2023-11-16 PROBLEM — E66.3 OVERWEIGHT (BMI 25.0-29.9): Status: RESOLVED | Noted: 2018-09-14 | Resolved: 2023-11-16

## 2023-11-16 PROBLEM — F33.41 MDD (MAJOR DEPRESSIVE DISORDER), RECURRENT, IN PARTIAL REMISSION (HCC): Status: RESOLVED | Noted: 2023-11-16 | Resolved: 2023-11-16

## 2023-11-16 PROBLEM — G89.29 CHRONIC BACK PAIN GREATER THAN 3 MONTHS DURATION: Status: RESOLVED | Noted: 2022-09-22 | Resolved: 2023-11-16

## 2023-11-16 PROBLEM — F33.41 MDD (MAJOR DEPRESSIVE DISORDER), RECURRENT, IN PARTIAL REMISSION (HCC): Status: ACTIVE | Noted: 2023-11-16

## 2023-11-16 LAB — HBA1C MFR BLD: 5.5 %

## 2023-11-16 PROCEDURE — 99395 PREV VISIT EST AGE 18-39: CPT | Performed by: INTERNAL MEDICINE

## 2023-11-16 ASSESSMENT — ENCOUNTER SYMPTOMS
CHEST TIGHTNESS: 0
COUGH: 0
RHINORRHEA: 0
DIARRHEA: 0
VOICE CHANGE: 0
ABDOMINAL PAIN: 0
COLOR CHANGE: 0
WHEEZING: 0
EYE PAIN: 0
SHORTNESS OF BREATH: 0
EYE REDNESS: 0
SINUS PRESSURE: 0
BLOOD IN STOOL: 0
EYE DISCHARGE: 0
SORE THROAT: 0
VOMITING: 0

## 2023-11-16 ASSESSMENT — VISUAL ACUITY: OU: 1

## 2023-11-16 NOTE — PATIENT INSTRUCTIONS
reason, on a heart-healthy diet, less than 7% of your calories should come from saturated fat and ideally 0% from trans fat. On an 1800-calorie diet, this translates into less than 14 grams of saturated fat per day, leaving 46 grams of fat to come from mono- and polyunsaturated fats.    Food Choices on a Heart Healthy Diet   Food Category   Foods Recommended   Foods to Avoid   Grains   Breads and rolls without salted tops Most dry and cooked cereals Unsalted crackers and breadsticks Low-sodium or homemade breadcrumbs or stuffing All rice and pastas   Breads, rolls, and crackers with salted tops High-fat baked goods (eg, muffins, donuts, pastries) Quick breads, self-rising flour, and biscuit mixes Regular bread crumbs Instant hot cereals Commercially prepared rice, pasta, or stuffing mixes   Vegetables   Most fresh, frozen, and low-sodium canned vegetables Low-sodium and salt-free vegetable juices Canned vegetables if unsalted or rinsed   Regular canned vegetables and juices, including sauerkraut and pickled vegetables Frozen vegetables with sauces Commercially prepared potato and vegetable mixes   Fruits   Most fresh, frozen, and canned fruits All fruit juices   Fruits processed with salt or sodium   Milk   Nonfat or low-fat (1%) milk Nonfat or low-fat yogurt Cottage cheese, low-fat ricotta, cheeses labeled as low-fat and low-sodium   Whole milk Reduced-fat (2%) milk Malted and chocolate milk Full fat yogurt Most cheeses (unless low-fat and low salt) Buttermilk (no more than 1 cup per week)   Meats and Beans   Lean cuts of fresh or frozen beef, veal, lamb, or pork (look for the word loin) Fresh or frozen poultry without the skin Fresh or frozen fish and some shellfish Egg whites and egg substitutes (Limit whole eggs to three per week) Tofu Nuts or seeds (unsalted, dry-roasted), low-sodium peanut butter Dried peas, beans, and lentils   Any smoked, cured, salted, or canned meat, fish, or poultry (including driscoll,

## 2023-11-16 NOTE — ASSESSMENT & PLAN NOTE
Patient denies issues with chronic major depression but has had some adjustment disorder symptoms due to financial stress with home and working and going to school full time for his Bachelor's Degree

## 2023-11-17 LAB
SHBG SERPL-SCNC: 36 NMOL/L (ref 11–80)
SHBG SERPL-SCNC: 88 PG/ML (ref 47–244)
TESTOST SERPL-MCNC: 449 NG/DL (ref 220–1000)

## 2023-12-12 LAB
Lab: NORMAL
REPORT: NORMAL
THIS TEST SENT TO: NORMAL

## 2023-12-15 LAB
DEPRECATED MISC ALLERGEN IGE RAST QL: NORMAL
DEPRECATED MISC ALLERGEN IGE RAST QL: NORMAL
MISCELLANEOUS LAB TEST ORDER: NORMAL
MISCELLANEOUS LAB TEST ORDER: NORMAL

## 2024-01-23 DIAGNOSIS — N13.8 BPH WITH OBSTRUCTION/LOWER URINARY TRACT SYMPTOMS: ICD-10-CM

## 2024-01-23 DIAGNOSIS — N40.1 BPH WITH OBSTRUCTION/LOWER URINARY TRACT SYMPTOMS: ICD-10-CM

## 2024-01-23 RX ORDER — TAMSULOSIN HYDROCHLORIDE 0.4 MG/1
0.4 CAPSULE ORAL NIGHTLY
Qty: 90 CAPSULE | Refills: 3 | Status: SHIPPED | OUTPATIENT
Start: 2024-01-23

## 2024-10-10 DIAGNOSIS — N40.1 BPH WITH OBSTRUCTION/LOWER URINARY TRACT SYMPTOMS: ICD-10-CM

## 2024-10-10 DIAGNOSIS — N13.8 BPH WITH OBSTRUCTION/LOWER URINARY TRACT SYMPTOMS: ICD-10-CM

## 2024-10-10 DIAGNOSIS — J45.20 ASTHMA, MILD INTERMITTENT, WELL-CONTROLLED: ICD-10-CM

## 2024-10-10 RX ORDER — TAMSULOSIN HYDROCHLORIDE 0.4 MG/1
0.4 CAPSULE ORAL NIGHTLY
Qty: 30 CAPSULE | Refills: 0 | Status: SHIPPED | OUTPATIENT
Start: 2024-10-10

## 2024-10-10 RX ORDER — ALBUTEROL SULFATE 90 UG/1
2 INHALANT RESPIRATORY (INHALATION) EVERY 4 HOURS PRN
Qty: 2 EACH | Refills: 3 | Status: SHIPPED | OUTPATIENT
Start: 2024-10-10

## 2024-10-10 NOTE — TELEPHONE ENCOUNTER
Rudloph Denny called to request a refill on his medication.      Last office visit : 11/16/2023   Next office visit : 11/18/2024     Requested Prescriptions     Pending Prescriptions Disp Refills    albuterol sulfate HFA (PROAIR HFA) 108 (90 Base) MCG/ACT inhaler 2 each 3     Sig: Inhale 2 puffs into the lungs every 4 hours as needed for Wheezing or Shortness of Breath            Kathy Fraire MA

## 2024-10-30 ENCOUNTER — OFFICE VISIT (OUTPATIENT)
Age: 39
End: 2024-10-30
Payer: COMMERCIAL

## 2024-10-30 VITALS
TEMPERATURE: 97.8 F | SYSTOLIC BLOOD PRESSURE: 116 MMHG | BODY MASS INDEX: 31.75 KG/M2 | DIASTOLIC BLOOD PRESSURE: 70 MMHG | RESPIRATION RATE: 20 BRPM | OXYGEN SATURATION: 96 % | WEIGHT: 215 LBS | HEART RATE: 93 BPM

## 2024-10-30 DIAGNOSIS — J06.9 UPPER RESPIRATORY TRACT INFECTION, UNSPECIFIED TYPE: ICD-10-CM

## 2024-10-30 DIAGNOSIS — R50.9 FEVER, UNSPECIFIED FEVER CAUSE: ICD-10-CM

## 2024-10-30 DIAGNOSIS — J02.9 SORE THROAT: Primary | ICD-10-CM

## 2024-10-30 DIAGNOSIS — R52 GENERALIZED BODY ACHES: ICD-10-CM

## 2024-10-30 LAB
INFLUENZA A ANTIBODY: NORMAL
INFLUENZA B ANTIBODY: NORMAL
Lab: NORMAL
QC PASS/FAIL: NORMAL
S PYO AG THROAT QL: NORMAL
SARS-COV-2, POC: NORMAL

## 2024-10-30 PROCEDURE — 87804 INFLUENZA ASSAY W/OPTIC: CPT

## 2024-10-30 PROCEDURE — 99213 OFFICE O/P EST LOW 20 MIN: CPT

## 2024-10-30 PROCEDURE — 96372 THER/PROPH/DIAG INJ SC/IM: CPT

## 2024-10-30 PROCEDURE — 87811 SARS-COV-2 COVID19 W/OPTIC: CPT

## 2024-10-30 PROCEDURE — 87880 STREP A ASSAY W/OPTIC: CPT

## 2024-10-30 RX ORDER — DEXAMETHASONE SODIUM PHOSPHATE 10 MG/ML
10 INJECTION INTRAMUSCULAR; INTRAVENOUS ONCE
Status: COMPLETED | OUTPATIENT
Start: 2024-10-30 | End: 2024-10-30

## 2024-10-30 RX ADMIN — DEXAMETHASONE SODIUM PHOSPHATE 10 MG: 10 INJECTION INTRAMUSCULAR; INTRAVENOUS at 07:38

## 2024-10-30 ASSESSMENT — ENCOUNTER SYMPTOMS
SHORTNESS OF BREATH: 0
SINUS PRESSURE: 0
BLOOD IN STOOL: 0
ABDOMINAL PAIN: 0
NAUSEA: 0
COLOR CHANGE: 0
DIARRHEA: 0
EYE ITCHING: 0
COUGH: 0
SORE THROAT: 1
CONSTIPATION: 0
RHINORRHEA: 0
EYE DISCHARGE: 0
VOMITING: 0
WHEEZING: 0

## 2024-10-30 NOTE — PATIENT INSTRUCTIONS
-COVID, flu, and strep testing are negative today  -Dexamethasone IM injection given, patient prefers IM over oral pills  - Increase fluid intake  - Encouraged adequate rest  - Recommended OTC claritin or zyrtec and flonase  - Take OTC motrin/tylenol for fevers/body aches  - Stay home until at least 24 hours fever free without medications.   - The patient is to follow up with PCP or return to clinic if symptoms worsen/fail to improve.     Any condition can change, despite proper treatment. Therefore, if symptoms still persist or worsen after treatment plan intitated today, either go to the nearest ER, or call PCP, or return to UC for further evaluation.    Urgent Care evaluation today is not a substitute for PCP visit. Follow up care is your responsibility to discuss and review this UC visit.     Discussed use, benefits, and side effects of any prescribed medications. All patient questions were answered. Patient demonstrates understanding and agrees with care plan. Patient was given educational materials - see patient instructions below

## 2024-10-30 NOTE — PROGRESS NOTES
Medication was administered by Kurt Montoya at 7:39 AM.    Medication: dexamethasone  Amount: 10mg  Route: intramuscular  Site: Dorsogluteal left    Patient tolerated well.    
Completed    Hib vaccine  Aged Out    HPV vaccine  Aged Out    Meningococcal (ACWY) vaccine  Aged Out    Varicella vaccine  Discontinued    Diabetes screen  Discontinued       Subjective:   Review of Systems   Constitutional:  Negative for activity change, appetite change, chills, fatigue and fever.   HENT:  Positive for congestion and sore throat. Negative for ear pain, rhinorrhea and sinus pressure.    Eyes:  Negative for discharge and itching.   Respiratory:  Negative for cough, shortness of breath and wheezing.    Cardiovascular:  Negative for chest pain.   Gastrointestinal:  Negative for abdominal pain, blood in stool, constipation, diarrhea, nausea and vomiting.   Musculoskeletal:  Positive for myalgias.   Skin:  Negative for color change and rash.   Neurological:  Positive for headaches. Negative for dizziness.   All other systems reviewed and are negative.      Objective    Physical Exam  Vitals and nursing note reviewed.   Constitutional:       General: He is not in acute distress.     Appearance: Normal appearance. He is ill-appearing. He is not toxic-appearing or diaphoretic.   HENT:      Head: Normocephalic and atraumatic.      Right Ear: Ear canal normal. A middle ear effusion (mild) is present. Tympanic membrane is not erythematous or bulging.      Left Ear: Ear canal normal. A middle ear effusion (mild) is present. Tympanic membrane is not erythematous or bulging.      Nose: Rhinorrhea present. No congestion.      Mouth/Throat:      Mouth: Mucous membranes are moist.      Pharynx: Posterior oropharyngeal erythema (moderate) present. No oropharyngeal exudate.   Eyes:      Extraocular Movements: Extraocular movements intact.      Pupils: Pupils are equal, round, and reactive to light.   Cardiovascular:      Rate and Rhythm: Normal rate and regular rhythm.      Pulses: Normal pulses.      Heart sounds: Normal heart sounds. No murmur heard.  Pulmonary:      Effort: Pulmonary effort is normal. No

## 2024-11-06 ENCOUNTER — OFFICE VISIT (OUTPATIENT)
Dept: PRIMARY CARE CLINIC | Age: 39
End: 2024-11-06

## 2024-11-06 VITALS
WEIGHT: 214 LBS | HEIGHT: 69 IN | TEMPERATURE: 97.3 F | OXYGEN SATURATION: 98 % | DIASTOLIC BLOOD PRESSURE: 80 MMHG | HEART RATE: 100 BPM | SYSTOLIC BLOOD PRESSURE: 122 MMHG | BODY MASS INDEX: 31.7 KG/M2

## 2024-11-06 DIAGNOSIS — J03.80 ACUTE VIRAL TONSILLITIS: Primary | ICD-10-CM

## 2024-11-06 DIAGNOSIS — R73.9 HYPERGLYCEMIA: ICD-10-CM

## 2024-11-06 DIAGNOSIS — J02.8 SORE THROAT (VIRAL): ICD-10-CM

## 2024-11-06 DIAGNOSIS — J03.90 TONSILLITIS WITH EXUDATE: ICD-10-CM

## 2024-11-06 DIAGNOSIS — B97.89 ACUTE VIRAL TONSILLITIS: Primary | ICD-10-CM

## 2024-11-06 DIAGNOSIS — F43.23 ADJUSTMENT DISORDER WITH MIXED ANXIETY AND DEPRESSED MOOD: ICD-10-CM

## 2024-11-06 DIAGNOSIS — B97.89 SORE THROAT (VIRAL): ICD-10-CM

## 2024-11-06 LAB
HBA1C MFR BLD: 5.3 %
HETEROPHILE ANTIBODIES: NORMAL
S PYO AG THROAT QL: NORMAL

## 2024-11-06 RX ORDER — METHYLPREDNISOLONE 4 MG/1
TABLET ORAL
Qty: 1 KIT | Refills: 0 | Status: SHIPPED | OUTPATIENT
Start: 2024-11-06 | End: 2024-11-12

## 2024-11-06 SDOH — ECONOMIC STABILITY: INCOME INSECURITY: HOW HARD IS IT FOR YOU TO PAY FOR THE VERY BASICS LIKE FOOD, HOUSING, MEDICAL CARE, AND HEATING?: NOT HARD AT ALL

## 2024-11-06 SDOH — ECONOMIC STABILITY: FOOD INSECURITY: WITHIN THE PAST 12 MONTHS, YOU WORRIED THAT YOUR FOOD WOULD RUN OUT BEFORE YOU GOT MONEY TO BUY MORE.: NEVER TRUE

## 2024-11-06 SDOH — ECONOMIC STABILITY: FOOD INSECURITY: WITHIN THE PAST 12 MONTHS, THE FOOD YOU BOUGHT JUST DIDN'T LAST AND YOU DIDN'T HAVE MONEY TO GET MORE.: NEVER TRUE

## 2024-11-06 SDOH — ECONOMIC STABILITY: TRANSPORTATION INSECURITY
IN THE PAST 12 MONTHS, HAS LACK OF TRANSPORTATION KEPT YOU FROM MEETINGS, WORK, OR FROM GETTING THINGS NEEDED FOR DAILY LIVING?: NO

## 2024-11-06 ASSESSMENT — ENCOUNTER SYMPTOMS
SORE THROAT: 1
VOMITING: 0
COUGH: 1
EYE PAIN: 0
EYE DISCHARGE: 0
EYE REDNESS: 0
CHEST TIGHTNESS: 0
SHORTNESS OF BREATH: 0
STRIDOR: 0
RHINORRHEA: 1
COLOR CHANGE: 0
VOICE CHANGE: 0
DIARRHEA: 0
BLOOD IN STOOL: 0
WHEEZING: 0
TROUBLE SWALLOWING: 1
ABDOMINAL PAIN: 0

## 2024-11-06 ASSESSMENT — PATIENT HEALTH QUESTIONNAIRE - PHQ9
SUM OF ALL RESPONSES TO PHQ QUESTIONS 1-9: 11
SUM OF ALL RESPONSES TO PHQ QUESTIONS 1-9: 11
2. FEELING DOWN, DEPRESSED OR HOPELESS: MORE THAN HALF THE DAYS
SUM OF ALL RESPONSES TO PHQ QUESTIONS 1-9: 11
5. POOR APPETITE OR OVEREATING: SEVERAL DAYS
3. TROUBLE FALLING OR STAYING ASLEEP: MORE THAN HALF THE DAYS
10. IF YOU CHECKED OFF ANY PROBLEMS, HOW DIFFICULT HAVE THESE PROBLEMS MADE IT FOR YOU TO DO YOUR WORK, TAKE CARE OF THINGS AT HOME, OR GET ALONG WITH OTHER PEOPLE: VERY DIFFICULT
5. POOR APPETITE OR OVEREATING: SEVERAL DAYS
7. TROUBLE CONCENTRATING ON THINGS, SUCH AS READING THE NEWSPAPER OR WATCHING TELEVISION: MORE THAN HALF THE DAYS
9. THOUGHTS THAT YOU WOULD BE BETTER OFF DEAD, OR OF HURTING YOURSELF: NOT AT ALL
SUM OF ALL RESPONSES TO PHQ QUESTIONS 1-9: 11
10. IF YOU CHECKED OFF ANY PROBLEMS, HOW DIFFICULT HAVE THESE PROBLEMS MADE IT FOR YOU TO DO YOUR WORK, TAKE CARE OF THINGS AT HOME, OR GET ALONG WITH OTHER PEOPLE: VERY DIFFICULT
SUM OF ALL RESPONSES TO PHQ9 QUESTIONS 1 & 2: 4
3. TROUBLE FALLING OR STAYING ASLEEP: MORE THAN HALF THE DAYS
6. FEELING BAD ABOUT YOURSELF - OR THAT YOU ARE A FAILURE OR HAVE LET YOURSELF OR YOUR FAMILY DOWN: NOT AT ALL
4. FEELING TIRED OR HAVING LITTLE ENERGY: MORE THAN HALF THE DAYS
8. MOVING OR SPEAKING SO SLOWLY THAT OTHER PEOPLE COULD HAVE NOTICED. OR THE OPPOSITE - BEING SO FIDGETY OR RESTLESS THAT YOU HAVE BEEN MOVING AROUND A LOT MORE THAN USUAL: NOT AT ALL
SUM OF ALL RESPONSES TO PHQ QUESTIONS 1-9: 11
9. THOUGHTS THAT YOU WOULD BE BETTER OFF DEAD, OR OF HURTING YOURSELF: NOT AT ALL
1. LITTLE INTEREST OR PLEASURE IN DOING THINGS: MORE THAN HALF THE DAYS
6. FEELING BAD ABOUT YOURSELF - OR THAT YOU ARE A FAILURE OR HAVE LET YOURSELF OR YOUR FAMILY DOWN: NOT AT ALL
8. MOVING OR SPEAKING SO SLOWLY THAT OTHER PEOPLE COULD HAVE NOTICED. OR THE OPPOSITE, BEING SO FIGETY OR RESTLESS THAT YOU HAVE BEEN MOVING AROUND A LOT MORE THAN USUAL: NOT AT ALL
2. FEELING DOWN, DEPRESSED OR HOPELESS: MORE THAN HALF THE DAYS
4. FEELING TIRED OR HAVING LITTLE ENERGY: MORE THAN HALF THE DAYS
1. LITTLE INTEREST OR PLEASURE IN DOING THINGS: MORE THAN HALF THE DAYS
7. TROUBLE CONCENTRATING ON THINGS, SUCH AS READING THE NEWSPAPER OR WATCHING TELEVISION: MORE THAN HALF THE DAYS

## 2024-11-06 NOTE — PROGRESS NOTES
on file for this visit.    Patient voices understanding and agrees to plans along with risks and benefits of plan.        Counseling:  Rudolph Denny's case, medications and options were discussed in detail. patient was instructed tocall the office if he   questions regarding him treatment. Should him conditions worsen, he should return to office to be reassessed by Dr. Lorraine Mcdaniel. he  Should to go the closest Emergency Department for any emergency. They verbalized understanding the above instructions.

## 2024-11-18 ENCOUNTER — OFFICE VISIT (OUTPATIENT)
Dept: PRIMARY CARE CLINIC | Age: 39
End: 2024-11-18

## 2024-11-18 VITALS
HEART RATE: 91 BPM | HEIGHT: 69 IN | WEIGHT: 209.13 LBS | BODY MASS INDEX: 30.97 KG/M2 | DIASTOLIC BLOOD PRESSURE: 74 MMHG | OXYGEN SATURATION: 98 % | SYSTOLIC BLOOD PRESSURE: 122 MMHG | TEMPERATURE: 97.2 F

## 2024-11-18 DIAGNOSIS — D70.8 OTHER NEUTROPENIA (HCC): ICD-10-CM

## 2024-11-18 DIAGNOSIS — Z00.00 ANNUAL PHYSICAL EXAM: ICD-10-CM

## 2024-11-18 DIAGNOSIS — B27.90 EBV INFECTION: ICD-10-CM

## 2024-11-18 DIAGNOSIS — D72.820 ATYPICAL LYMPHOCYTOSIS: Primary | ICD-10-CM

## 2024-11-18 DIAGNOSIS — Z00.01 ENCOUNTER FOR WELL ADULT EXAM WITH ABNORMAL FINDINGS: Primary | ICD-10-CM

## 2024-11-18 DIAGNOSIS — R74.8 ELEVATED LIVER ENZYMES: ICD-10-CM

## 2024-11-18 DIAGNOSIS — Z23 FLU VACCINE NEED: ICD-10-CM

## 2024-11-18 DIAGNOSIS — Z00.00 ANNUAL PHYSICAL EXAM: Primary | ICD-10-CM

## 2024-11-18 DIAGNOSIS — J20.8 ACUTE BRONCHITIS DUE TO OTHER SPECIFIED ORGANISMS: ICD-10-CM

## 2024-11-18 DIAGNOSIS — D72.820 ATYPICAL LYMPHOCYTOSIS: ICD-10-CM

## 2024-11-18 LAB
ALBUMIN SERPL-MCNC: 4.6 G/DL (ref 3.5–5.2)
ALP SERPL-CCNC: 106 U/L (ref 40–129)
ALT SERPL-CCNC: 104 U/L (ref 5–41)
ANION GAP SERPL CALCULATED.3IONS-SCNC: 9 MMOL/L (ref 7–19)
AST SERPL-CCNC: 38 U/L (ref 5–40)
BASOPHILS # BLD: 0 K/UL (ref 0–0.2)
BASOPHILS NFR BLD: 0 % (ref 0–1)
BILIRUB SERPL-MCNC: 0.5 MG/DL (ref 0.2–1.2)
BUN SERPL-MCNC: 14 MG/DL (ref 6–20)
CALCIUM SERPL-MCNC: 9 MG/DL (ref 8.6–10)
CHLORIDE SERPL-SCNC: 103 MMOL/L (ref 98–111)
CHOLEST SERPL-MCNC: 182 MG/DL (ref 0–199)
CO2 SERPL-SCNC: 29 MMOL/L (ref 22–29)
CREAT SERPL-MCNC: 0.9 MG/DL (ref 0.7–1.2)
EBV VCA AB SER QL: POSITIVE
EOSINOPHIL # BLD: 0.13 K/UL (ref 0–0.6)
EOSINOPHIL NFR BLD: 2 % (ref 0–5)
ERYTHROCYTE [DISTWIDTH] IN BLOOD BY AUTOMATED COUNT: 12.7 % (ref 11.5–14.5)
GLUCOSE P FAST SERPL-MCNC: 94 MG/DL (ref 70–99)
HCT VFR BLD AUTO: 49.1 % (ref 42–52)
HDLC SERPL-MCNC: 38 MG/DL (ref 40–60)
HGB BLD-MCNC: 16.2 G/DL (ref 14–18)
IMM GRANULOCYTES # BLD: 0 K/UL
LDLC SERPL CALC-MCNC: 99 MG/DL
LYMPHOCYTES # BLD: 5.1 K/UL (ref 1.1–4.5)
LYMPHOCYTES NFR BLD: 63 % (ref 20–40)
MCH RBC QN AUTO: 29.3 PG (ref 27–31)
MCHC RBC AUTO-ENTMCNC: 33 G/DL (ref 33–37)
MCV RBC AUTO: 88.8 FL (ref 80–94)
MONOCYTES # BLD: 0.3 K/UL (ref 0–0.9)
MONOCYTES NFR BLD: 5 % (ref 0–10)
NEUTROPHILS # BLD: 1 K/UL (ref 1.5–7.5)
NEUTS SEG NFR BLD: 15 % (ref 50–65)
PLATELET # BLD AUTO: 355 K/UL (ref 130–400)
PLATELET SLIDE REVIEW: ADEQUATE
PMV BLD AUTO: 9.5 FL (ref 9.4–12.4)
POTASSIUM SERPL-SCNC: 3.8 MMOL/L (ref 3.5–5)
PROT SERPL-MCNC: 7.2 G/DL (ref 6.4–8.3)
RBC # BLD AUTO: 5.53 M/UL (ref 4.7–6.1)
SODIUM SERPL-SCNC: 141 MMOL/L (ref 136–145)
T4 FREE SERPL-MCNC: 1.34 NG/DL (ref 0.93–1.7)
TRIGL SERPL-MCNC: 224 MG/DL (ref 0–149)
TSH SERPL DL<=0.005 MIU/L-ACNC: 1.41 UIU/ML (ref 0.27–4.2)
VARIANT LYMPHS NFR BLD: 15 % (ref 0–8)
WBC # BLD AUTO: 6.5 K/UL (ref 4.8–10.8)

## 2024-11-18 RX ORDER — AZITHROMYCIN 250 MG/1
TABLET, FILM COATED ORAL
Qty: 6 TABLET | Refills: 0 | Status: SHIPPED | OUTPATIENT
Start: 2024-11-18 | End: 2024-11-28

## 2024-11-18 ASSESSMENT — PATIENT HEALTH QUESTIONNAIRE - PHQ9
5. POOR APPETITE OR OVEREATING: NOT AT ALL
7. TROUBLE CONCENTRATING ON THINGS, SUCH AS READING THE NEWSPAPER OR WATCHING TELEVISION: NOT AT ALL
10. IF YOU CHECKED OFF ANY PROBLEMS, HOW DIFFICULT HAVE THESE PROBLEMS MADE IT FOR YOU TO DO YOUR WORK, TAKE CARE OF THINGS AT HOME, OR GET ALONG WITH OTHER PEOPLE: NOT DIFFICULT AT ALL
SUM OF ALL RESPONSES TO PHQ9 QUESTIONS 1 & 2: 0
SUM OF ALL RESPONSES TO PHQ QUESTIONS 1-9: 1
8. MOVING OR SPEAKING SO SLOWLY THAT OTHER PEOPLE COULD HAVE NOTICED. OR THE OPPOSITE - BEING SO FIDGETY OR RESTLESS THAT YOU HAVE BEEN MOVING AROUND A LOT MORE THAN USUAL: NOT AT ALL
3. TROUBLE FALLING OR STAYING ASLEEP: NOT AT ALL
2. FEELING DOWN, DEPRESSED OR HOPELESS: NOT AT ALL
SUM OF ALL RESPONSES TO PHQ QUESTIONS 1-9: 1
8. MOVING OR SPEAKING SO SLOWLY THAT OTHER PEOPLE COULD HAVE NOTICED. OR THE OPPOSITE, BEING SO FIGETY OR RESTLESS THAT YOU HAVE BEEN MOVING AROUND A LOT MORE THAN USUAL: NOT AT ALL
9. THOUGHTS THAT YOU WOULD BE BETTER OFF DEAD, OR OF HURTING YOURSELF: NOT AT ALL
1. LITTLE INTEREST OR PLEASURE IN DOING THINGS: NOT AT ALL
2. FEELING DOWN, DEPRESSED OR HOPELESS: NOT AT ALL
5. POOR APPETITE OR OVEREATING: NOT AT ALL
7. TROUBLE CONCENTRATING ON THINGS, SUCH AS READING THE NEWSPAPER OR WATCHING TELEVISION: NOT AT ALL
1. LITTLE INTEREST OR PLEASURE IN DOING THINGS: NOT AT ALL
SUM OF ALL RESPONSES TO PHQ QUESTIONS 1-9: 1
9. THOUGHTS THAT YOU WOULD BE BETTER OFF DEAD, OR OF HURTING YOURSELF: NOT AT ALL
6. FEELING BAD ABOUT YOURSELF - OR THAT YOU ARE A FAILURE OR HAVE LET YOURSELF OR YOUR FAMILY DOWN: NOT AT ALL
4. FEELING TIRED OR HAVING LITTLE ENERGY: SEVERAL DAYS
3. TROUBLE FALLING OR STAYING ASLEEP: NOT AT ALL
10. IF YOU CHECKED OFF ANY PROBLEMS, HOW DIFFICULT HAVE THESE PROBLEMS MADE IT FOR YOU TO DO YOUR WORK, TAKE CARE OF THINGS AT HOME, OR GET ALONG WITH OTHER PEOPLE: NOT DIFFICULT AT ALL
SUM OF ALL RESPONSES TO PHQ QUESTIONS 1-9: 1
SUM OF ALL RESPONSES TO PHQ QUESTIONS 1-9: 1
4. FEELING TIRED OR HAVING LITTLE ENERGY: SEVERAL DAYS
6. FEELING BAD ABOUT YOURSELF - OR THAT YOU ARE A FAILURE OR HAVE LET YOURSELF OR YOUR FAMILY DOWN: NOT AT ALL

## 2024-11-18 NOTE — PROGRESS NOTES
choice of behavior. Suggested weight control approaches, including dietary changes behavioral modification and follow up plan. Provided educational and support documentation.  Time spent (minutes): 8 min    Cardiovascular Disease Risk Counseling: Assessed the patient's risk to develop cardiovascular disease and reviewed their main risk factors.  Reviewed steps to reduce disease risk including:   Quitting tobacco use, reducing amount smoked, or not starting the habit  Making healthy food choices  Being physically active and gradualy increasing activity levels   Reduce weight and determine a healthy BMI goal  Monitor blood pressure and treat if higher than 140/90 mmHg  Maintain blood total cholesterol levels under 5 mmol/l or 190 mg/dl  Maintain LDL cholesterol levels under 3.0 mmol/l or 115 mg/dl   Control blood glucose levels   Provided a follow up plan.  Time spent (minutes): 5 min

## 2024-11-19 LAB
SHBG SERPL-SCNC: 110.2 PG/ML (ref 47–244)
SHBG SERPL-SCNC: 51 NMOL/L (ref 17–56)
TESTOST SERPL-MCNC: 665 NG/DL (ref 249–836)

## 2024-12-05 DIAGNOSIS — R74.8 ELEVATED LIVER ENZYMES: ICD-10-CM

## 2024-12-05 DIAGNOSIS — D70.8 OTHER NEUTROPENIA (HCC): ICD-10-CM

## 2024-12-05 LAB
ALBUMIN SERPL-MCNC: 4.4 G/DL (ref 3.5–5.2)
ALP SERPL-CCNC: 82 U/L (ref 40–129)
ALT SERPL-CCNC: 67 U/L (ref 5–41)
ANION GAP SERPL CALCULATED.3IONS-SCNC: 9 MMOL/L (ref 7–19)
AST SERPL-CCNC: 30 U/L (ref 5–40)
BASOPHILS # BLD: 0.1 K/UL (ref 0–0.2)
BASOPHILS NFR BLD: 1 % (ref 0–1)
BILIRUB SERPL-MCNC: 0.6 MG/DL (ref 0.2–1.2)
BUN SERPL-MCNC: 11 MG/DL (ref 6–20)
CALCIUM SERPL-MCNC: 9.3 MG/DL (ref 8.6–10)
CHLORIDE SERPL-SCNC: 104 MMOL/L (ref 98–111)
CO2 SERPL-SCNC: 27 MMOL/L (ref 22–29)
CREAT SERPL-MCNC: 1.1 MG/DL (ref 0.7–1.2)
EOSINOPHIL # BLD: 0.2 K/UL (ref 0–0.6)
EOSINOPHIL NFR BLD: 3.1 % (ref 0–5)
ERYTHROCYTE [DISTWIDTH] IN BLOOD BY AUTOMATED COUNT: 12.7 % (ref 11.5–14.5)
GLUCOSE SERPL-MCNC: 99 MG/DL (ref 70–99)
HCT VFR BLD AUTO: 47.6 % (ref 42–52)
HGB BLD-MCNC: 15.9 G/DL (ref 14–18)
IMM GRANULOCYTES # BLD: 0 K/UL
LYMPHOCYTES # BLD: 2.7 K/UL (ref 1.1–4.5)
LYMPHOCYTES NFR BLD: 55.9 % (ref 20–40)
MCH RBC QN AUTO: 29.1 PG (ref 27–31)
MCHC RBC AUTO-ENTMCNC: 33.4 G/DL (ref 33–37)
MCV RBC AUTO: 87 FL (ref 80–94)
MONOCYTES # BLD: 0.4 K/UL (ref 0–0.9)
MONOCYTES NFR BLD: 7.6 % (ref 0–10)
NEUTROPHILS # BLD: 1.6 K/UL (ref 1.5–7.5)
NEUTS SEG NFR BLD: 32.2 % (ref 50–65)
PLATELET # BLD AUTO: 239 K/UL (ref 130–400)
PMV BLD AUTO: 9.7 FL (ref 9.4–12.4)
POTASSIUM SERPL-SCNC: 4.7 MMOL/L (ref 3.5–5)
PROT SERPL-MCNC: 6.8 G/DL (ref 6.4–8.3)
RBC # BLD AUTO: 5.47 M/UL (ref 4.7–6.1)
SODIUM SERPL-SCNC: 140 MMOL/L (ref 136–145)
WBC # BLD AUTO: 4.9 K/UL (ref 4.8–10.8)

## 2024-12-19 ENCOUNTER — OFFICE VISIT (OUTPATIENT)
Dept: PRIMARY CARE CLINIC | Age: 39
End: 2024-12-19
Payer: COMMERCIAL

## 2024-12-19 VITALS
HEART RATE: 87 BPM | TEMPERATURE: 97.6 F | OXYGEN SATURATION: 95 % | WEIGHT: 210 LBS | SYSTOLIC BLOOD PRESSURE: 120 MMHG | DIASTOLIC BLOOD PRESSURE: 80 MMHG | BODY MASS INDEX: 31.1 KG/M2 | HEIGHT: 69 IN

## 2024-12-19 DIAGNOSIS — E66.09 CLASS 1 OBESITY DUE TO EXCESS CALORIES WITHOUT SERIOUS COMORBIDITY WITH BODY MASS INDEX (BMI) OF 30.0 TO 30.9 IN ADULT: ICD-10-CM

## 2024-12-19 DIAGNOSIS — D23.72 DYSPLASTIC NEVUS OF LOWER EXTREMITY, LEFT: Primary | ICD-10-CM

## 2024-12-19 DIAGNOSIS — B27.00 ACUTE EPSTEIN BARR VIRUS (EBV) INFECTION: ICD-10-CM

## 2024-12-19 DIAGNOSIS — E66.811 CLASS 1 OBESITY DUE TO EXCESS CALORIES WITHOUT SERIOUS COMORBIDITY WITH BODY MASS INDEX (BMI) OF 30.0 TO 30.9 IN ADULT: ICD-10-CM

## 2024-12-19 PROCEDURE — 99213 OFFICE O/P EST LOW 20 MIN: CPT | Performed by: INTERNAL MEDICINE

## 2024-12-19 PROCEDURE — G8484 FLU IMMUNIZE NO ADMIN: HCPCS | Performed by: INTERNAL MEDICINE

## 2024-12-19 PROCEDURE — G8417 CALC BMI ABV UP PARAM F/U: HCPCS | Performed by: INTERNAL MEDICINE

## 2024-12-19 PROCEDURE — G8427 DOCREV CUR MEDS BY ELIG CLIN: HCPCS | Performed by: INTERNAL MEDICINE

## 2024-12-19 PROCEDURE — 1036F TOBACCO NON-USER: CPT | Performed by: INTERNAL MEDICINE

## 2024-12-19 ASSESSMENT — ENCOUNTER SYMPTOMS
DIARRHEA: 0
WHEEZING: 0
EYE REDNESS: 0
BLOOD IN STOOL: 0
RHINORRHEA: 0
COUGH: 0
SHORTNESS OF BREATH: 0
SINUS PRESSURE: 0
ROS SKIN COMMENTS: SEE HPI
ABDOMINAL PAIN: 0
VOMITING: 0
CHEST TIGHTNESS: 0
VOICE CHANGE: 0
COLOR CHANGE: 1
EYE DISCHARGE: 0
SORE THROAT: 0
EYE PAIN: 0

## 2024-12-19 NOTE — PROGRESS NOTES
Behavior: Behavior normal. Behavior is cooperative.         Thought Content: Thought content normal.         Cognition and Memory: Cognition and memory normal.         Judgment: Judgment normal.         No results found for this visit on 12/19/24.    Results       Assessment/Plan:    ICD-10-CM    1. Dysplastic nevus of lower extremity, left  D23.72 External Referral To Dermatology      2. Acute Jesus Barr virus (EBV) infection  B27.00       3. Class 1 obesity due to excess calories without serious comorbidity with body mass index (BMI) of 30.0 to 30.9 in adult  E66.811     E66.09     Z68.30             Assessment & Plan  1. Dysplastic nevus.  The patient presents with an abnormal mole on the medial side of the left fourth toe, which has recently darkened. The mole measures approximately 4-5 mm and exhibits irregular borders. There is no itching or bleeding reported. A referral to Dr. Grady Madrid, a dermatologist, has been initiated for further evaluation and potential excision of the nevus. He is advised to use sunscreen with a minimum SPF of 50 for skin protection. If he does not hear from the dermatologist within a week, he should notify the office.    2. Fatigue recently exacerbated due to acute EBV mononucleosis.  The patient reports ongoing fatigue, which has not worsened since the last visit. No new symptoms have been noted.  Sore throat and cervical lymphadenopathy have resolved           Return if symptoms worsen or fail to improve.    Over 50% of the total visit time of 20 minutes was spent on counseling and/or coordination of care of:   1. Dysplastic nevus of lower extremity, left    2. Acute Jesus Barr virus (EBV) infection    3. Class 1 obesity due to excess calories without serious comorbidity with body mass index (BMI) of 30.0 to 30.9 in adult          Orders Placed This Encounter   Procedures    External Referral To Dermatology     Referral Priority:   Routine     Referral Type:   Eval and Treat

## 2024-12-22 DIAGNOSIS — N40.1 BPH WITH OBSTRUCTION/LOWER URINARY TRACT SYMPTOMS: ICD-10-CM

## 2024-12-22 DIAGNOSIS — N13.8 BPH WITH OBSTRUCTION/LOWER URINARY TRACT SYMPTOMS: ICD-10-CM

## 2024-12-23 RX ORDER — TAMSULOSIN HYDROCHLORIDE 0.4 MG/1
0.4 CAPSULE ORAL NIGHTLY
Qty: 30 CAPSULE | Refills: 2 | Status: SHIPPED | OUTPATIENT
Start: 2024-12-23

## 2025-02-20 ENCOUNTER — OFFICE VISIT (OUTPATIENT)
Dept: UROLOGY | Age: 40
End: 2025-02-20
Payer: COMMERCIAL

## 2025-02-20 VITALS — WEIGHT: 221.4 LBS | BODY MASS INDEX: 32.79 KG/M2 | HEIGHT: 69 IN | TEMPERATURE: 98.2 F

## 2025-02-20 DIAGNOSIS — N13.8 BPH WITH OBSTRUCTION/LOWER URINARY TRACT SYMPTOMS: ICD-10-CM

## 2025-02-20 DIAGNOSIS — N13.8 BPH WITH OBSTRUCTION/LOWER URINARY TRACT SYMPTOMS: Primary | ICD-10-CM

## 2025-02-20 DIAGNOSIS — N40.1 BPH WITH OBSTRUCTION/LOWER URINARY TRACT SYMPTOMS: Primary | ICD-10-CM

## 2025-02-20 DIAGNOSIS — N40.1 BPH WITH OBSTRUCTION/LOWER URINARY TRACT SYMPTOMS: ICD-10-CM

## 2025-02-20 LAB
APPEARANCE FLUID: CLEAR
BILIRUBIN, POC: NORMAL
BLOOD URINE, POC: NORMAL
CLARITY, POC: CLEAR
COLOR, POC: YELLOW
GLUCOSE URINE, POC: NORMAL MG/DL
KETONES, POC: NORMAL MG/DL
LEUKOCYTE EST, POC: NORMAL
NITRITE, POC: NORMAL
PH, POC: 7
PROTEIN, POC: NORMAL MG/DL
PSA SERPL-MCNC: 0.31 NG/ML (ref 0–4)
SPECIFIC GRAVITY, POC: 1.01
UROBILINOGEN, POC: 0.2 MG/DL

## 2025-02-20 PROCEDURE — G8417 CALC BMI ABV UP PARAM F/U: HCPCS | Performed by: NURSE PRACTITIONER

## 2025-02-20 PROCEDURE — 99214 OFFICE O/P EST MOD 30 MIN: CPT | Performed by: NURSE PRACTITIONER

## 2025-02-20 PROCEDURE — 1036F TOBACCO NON-USER: CPT | Performed by: NURSE PRACTITIONER

## 2025-02-20 PROCEDURE — 51798 US URINE CAPACITY MEASURE: CPT | Performed by: NURSE PRACTITIONER

## 2025-02-20 PROCEDURE — G8427 DOCREV CUR MEDS BY ELIG CLIN: HCPCS | Performed by: NURSE PRACTITIONER

## 2025-02-20 PROCEDURE — 81002 URINALYSIS NONAUTO W/O SCOPE: CPT | Performed by: NURSE PRACTITIONER

## 2025-02-20 RX ORDER — TAMSULOSIN HYDROCHLORIDE 0.4 MG/1
0.4 CAPSULE ORAL NIGHTLY
Qty: 90 CAPSULE | Refills: 3 | Status: SHIPPED | OUTPATIENT
Start: 2025-02-20

## 2025-02-20 ASSESSMENT — ENCOUNTER SYMPTOMS
ABDOMINAL PAIN: 0
VOMITING: 0
NAUSEA: 0
ABDOMINAL DISTENTION: 0

## 2025-02-20 NOTE — PROGRESS NOTES
Rudolph Denny is a 39 y.o., male, Established patient who presents today   Chief Complaint   Patient presents with    Follow-up     I am here today for my 1 year BPH follow up.         Patient presents for follow-up of testicular pain and lower urinary tract symptoms.  He reports no new issues with pain since his previous visit.  He was noted to have bilateral epididymal cyst as well as a left-sided varicocele.  He did well with treatment with Flexeril, then, and Flomax.  He remains on the Flomax for treatment of his lower urinary tract symptoms which have also significantly improved with the initiation of alpha-blocker therapy.  He actually ran out of Flomax and without the medication, he began experiencing discomfort once again with urination.  Symptoms have resolved after reinitiating medication.  He complains mostly of feeling of incomplete emptying, frequency, intermittency, urgency, weak stream, straining, nocturia x 1.  Has an AUA symptom score of 11/35 with a bother score of 2.  Patient also reports possible history of prostate cancer in his father.     Lab Results   Component Value Date    PSA 0.31 02/20/2025      REVIEW OF SYSTEMS:  Review of Systems   Constitutional:  Negative for chills and fever.   Gastrointestinal:  Negative for abdominal distention, abdominal pain, nausea and vomiting.   Genitourinary:  Positive for frequency and urgency. Negative for difficulty urinating, dysuria, flank pain and hematuria.   Psychiatric/Behavioral:  Negative for agitation and confusion.        PHYSICAL EXAM:  Temp 98.2 °F (36.8 °C) (Temporal)   Ht 1.753 m (5' 9\")   Wt 100.4 kg (221 lb 6.4 oz)   BMI 32.70 kg/m²   Physical Exam  Vitals and nursing note reviewed.   Constitutional:       General: He is not in acute distress.     Appearance: Normal appearance. He is not ill-appearing.   Pulmonary:      Effort: Pulmonary effort is normal. No respiratory distress.   Abdominal:      General: There is no distension.

## 2025-02-28 DIAGNOSIS — N13.8 BPH WITH OBSTRUCTION/LOWER URINARY TRACT SYMPTOMS: ICD-10-CM

## 2025-02-28 DIAGNOSIS — N40.1 BPH WITH OBSTRUCTION/LOWER URINARY TRACT SYMPTOMS: ICD-10-CM

## 2025-02-28 RX ORDER — TAMSULOSIN HYDROCHLORIDE 0.4 MG/1
0.4 CAPSULE ORAL NIGHTLY
Qty: 90 CAPSULE | Refills: 3 | Status: SHIPPED | OUTPATIENT
Start: 2025-02-28

## 2025-05-19 ENCOUNTER — OFFICE VISIT (OUTPATIENT)
Dept: PRIMARY CARE CLINIC | Age: 40
End: 2025-05-19
Payer: COMMERCIAL

## 2025-05-19 VITALS
BODY MASS INDEX: 32.29 KG/M2 | HEIGHT: 69 IN | SYSTOLIC BLOOD PRESSURE: 118 MMHG | TEMPERATURE: 98.7 F | OXYGEN SATURATION: 97 % | WEIGHT: 218 LBS | DIASTOLIC BLOOD PRESSURE: 72 MMHG | HEART RATE: 79 BPM

## 2025-05-19 DIAGNOSIS — R53.82 CHRONIC FATIGUE: Primary | ICD-10-CM

## 2025-05-19 DIAGNOSIS — H20.9 UVEITIS OF LEFT EYE: ICD-10-CM

## 2025-05-19 DIAGNOSIS — B27.90 EBV INFECTION: ICD-10-CM

## 2025-05-19 DIAGNOSIS — Z13.0 SCREENING FOR DEFICIENCY ANEMIA: ICD-10-CM

## 2025-05-19 DIAGNOSIS — E66.811 CLASS 1 OBESITY DUE TO EXCESS CALORIES WITHOUT SERIOUS COMORBIDITY WITH BODY MASS INDEX (BMI) OF 32.0 TO 32.9 IN ADULT: ICD-10-CM

## 2025-05-19 DIAGNOSIS — E66.09 CLASS 1 OBESITY DUE TO EXCESS CALORIES WITHOUT SERIOUS COMORBIDITY WITH BODY MASS INDEX (BMI) OF 32.0 TO 32.9 IN ADULT: ICD-10-CM

## 2025-05-19 PROBLEM — M25.521 BILATERAL ELBOW JOINT PAIN: Status: RESOLVED | Noted: 2022-09-22 | Resolved: 2025-05-19

## 2025-05-19 PROBLEM — M25.522 BILATERAL ELBOW JOINT PAIN: Status: RESOLVED | Noted: 2022-09-22 | Resolved: 2025-05-19

## 2025-05-19 PROBLEM — D72.820 ATYPICAL LYMPHOCYTOSIS: Status: RESOLVED | Noted: 2024-11-18 | Resolved: 2025-05-19

## 2025-05-19 PROCEDURE — G8417 CALC BMI ABV UP PARAM F/U: HCPCS | Performed by: INTERNAL MEDICINE

## 2025-05-19 PROCEDURE — G8427 DOCREV CUR MEDS BY ELIG CLIN: HCPCS | Performed by: INTERNAL MEDICINE

## 2025-05-19 PROCEDURE — 1036F TOBACCO NON-USER: CPT | Performed by: INTERNAL MEDICINE

## 2025-05-19 PROCEDURE — 99214 OFFICE O/P EST MOD 30 MIN: CPT | Performed by: INTERNAL MEDICINE

## 2025-05-19 SDOH — ECONOMIC STABILITY: INCOME INSECURITY: IN THE LAST 12 MONTHS, WAS THERE A TIME WHEN YOU WERE NOT ABLE TO PAY THE MORTGAGE OR RENT ON TIME?: NO

## 2025-05-19 SDOH — ECONOMIC STABILITY: FOOD INSECURITY: WITHIN THE PAST 12 MONTHS, THE FOOD YOU BOUGHT JUST DIDN'T LAST AND YOU DIDN'T HAVE MONEY TO GET MORE.: NEVER TRUE

## 2025-05-19 SDOH — ECONOMIC STABILITY: FOOD INSECURITY: WITHIN THE PAST 12 MONTHS, YOU WORRIED THAT YOUR FOOD WOULD RUN OUT BEFORE YOU GOT MONEY TO BUY MORE.: NEVER TRUE

## 2025-05-19 SDOH — ECONOMIC STABILITY: TRANSPORTATION INSECURITY
IN THE PAST 12 MONTHS, HAS THE LACK OF TRANSPORTATION KEPT YOU FROM MEDICAL APPOINTMENTS OR FROM GETTING MEDICATIONS?: NO

## 2025-05-19 ASSESSMENT — ENCOUNTER SYMPTOMS
BLOOD IN STOOL: 0
WHEEZING: 0
EYE DISCHARGE: 0
SINUS PRESSURE: 0
CHEST TIGHTNESS: 0
DIARRHEA: 0
VOMITING: 0
ABDOMINAL PAIN: 0
EYE PAIN: 1
BACK PAIN: 1
VOICE CHANGE: 0
COLOR CHANGE: 0
RHINORRHEA: 0
COUGH: 0
EYE REDNESS: 0
SHORTNESS OF BREATH: 0
SORE THROAT: 0

## 2025-05-19 ASSESSMENT — PATIENT HEALTH QUESTIONNAIRE - PHQ9
8. MOVING OR SPEAKING SO SLOWLY THAT OTHER PEOPLE COULD HAVE NOTICED. OR THE OPPOSITE, BEING SO FIGETY OR RESTLESS THAT YOU HAVE BEEN MOVING AROUND A LOT MORE THAN USUAL: NOT AT ALL
10. IF YOU CHECKED OFF ANY PROBLEMS, HOW DIFFICULT HAVE THESE PROBLEMS MADE IT FOR YOU TO DO YOUR WORK, TAKE CARE OF THINGS AT HOME, OR GET ALONG WITH OTHER PEOPLE: SOMEWHAT DIFFICULT
SUM OF ALL RESPONSES TO PHQ QUESTIONS 1-9: 6
3. TROUBLE FALLING OR STAYING ASLEEP: SEVERAL DAYS
1. LITTLE INTEREST OR PLEASURE IN DOING THINGS: NOT AT ALL
SUM OF ALL RESPONSES TO PHQ QUESTIONS 1-9: 6
6. FEELING BAD ABOUT YOURSELF - OR THAT YOU ARE A FAILURE OR HAVE LET YOURSELF OR YOUR FAMILY DOWN: NOT AT ALL
5. POOR APPETITE OR OVEREATING: NOT AT ALL
1. LITTLE INTEREST OR PLEASURE IN DOING THINGS: NOT AT ALL
9. THOUGHTS THAT YOU WOULD BE BETTER OFF DEAD, OR OF HURTING YOURSELF: NOT AT ALL
2. FEELING DOWN, DEPRESSED OR HOPELESS: NOT AT ALL
SUM OF ALL RESPONSES TO PHQ QUESTIONS 1-9: 6
6. FEELING BAD ABOUT YOURSELF - OR THAT YOU ARE A FAILURE OR HAVE LET YOURSELF OR YOUR FAMILY DOWN: NOT AT ALL
SUM OF ALL RESPONSES TO PHQ QUESTIONS 1-9: 6
7. TROUBLE CONCENTRATING ON THINGS, SUCH AS READING THE NEWSPAPER OR WATCHING TELEVISION: MORE THAN HALF THE DAYS
SUM OF ALL RESPONSES TO PHQ QUESTIONS 1-9: 6
10. IF YOU CHECKED OFF ANY PROBLEMS, HOW DIFFICULT HAVE THESE PROBLEMS MADE IT FOR YOU TO DO YOUR WORK, TAKE CARE OF THINGS AT HOME, OR GET ALONG WITH OTHER PEOPLE: SOMEWHAT DIFFICULT
7. TROUBLE CONCENTRATING ON THINGS, SUCH AS READING THE NEWSPAPER OR WATCHING TELEVISION: MORE THAN HALF THE DAYS
2. FEELING DOWN, DEPRESSED OR HOPELESS: NOT AT ALL
4. FEELING TIRED OR HAVING LITTLE ENERGY: NEARLY EVERY DAY
9. THOUGHTS THAT YOU WOULD BE BETTER OFF DEAD, OR OF HURTING YOURSELF: NOT AT ALL
5. POOR APPETITE OR OVEREATING: NOT AT ALL
8. MOVING OR SPEAKING SO SLOWLY THAT OTHER PEOPLE COULD HAVE NOTICED. OR THE OPPOSITE - BEING SO FIDGETY OR RESTLESS THAT YOU HAVE BEEN MOVING AROUND A LOT MORE THAN USUAL: NOT AT ALL
3. TROUBLE FALLING OR STAYING ASLEEP: SEVERAL DAYS
4. FEELING TIRED OR HAVING LITTLE ENERGY: NEARLY EVERY DAY

## 2025-05-19 NOTE — PROGRESS NOTES
Rudolph Denny is a 39 y.o. male who presents today for   Chief Complaint   Patient presents with    Fatigue         History of Present Illness  The patient is a 39-year-old white male who presents for complaints of persistent issues with fatigue.    He has been experiencing chronic fatigue, which he attributes to his mononucleosis diagnosis in 11/2024. Despite obtaining approximately 7 hours of sleep per night, he continues to feel perpetually tired. He acknowledges snoring but reports no exacerbation over the past 6 to 12 months. He does not experience morning headaches. He reports no recent COVID-19 infection within the past 6 to 12 months. He also reports no symptoms of restless legs syndrome or periodic limb movements during sleep.    He recently consulted an ophthalmologist due to intermittent eye pain, particularly when looking down. This symptom has been present for several years, coinciding with his increased study time and prolonged screen exposure. The ophthalmologist identified inflammation in his left eye and prescribed prednisone, with a follow-up appointment scheduled for 05/20/2025. He was advised to undergo thyroid and autoimmune testing. He reports no changes in iris color.    He reports lower back pain and right hip pain, which he describes as a clicking sensation. He does not experience any small joint pain. He also reports hand pain, which he attributes to overuse, but does not experience any associated tingling or numbness.    PAST SURGICAL HISTORY:  He is being treated by urology for benign prostate enlargement. He had a prostate exam and it was enlarged. He was having pain in the left chest. He had a varicocele.    FAMILY HISTORY  His mother may have lupus.         BMI Readings from Last 3 Encounters:   05/19/25 32.19 kg/m²   02/20/25 32.70 kg/m²   12/19/24 31.01 kg/m²     Wt Readings from Last 3 Encounters:   05/19/25 98.9 kg (218 lb)   02/20/25 100.4 kg (221 lb 6.4 oz)   12/19/24 95.3 kg

## 2025-05-21 DIAGNOSIS — Z13.0 SCREENING FOR DEFICIENCY ANEMIA: ICD-10-CM

## 2025-05-21 DIAGNOSIS — H20.9 UVEITIS OF LEFT EYE: ICD-10-CM

## 2025-05-21 DIAGNOSIS — R53.82 CHRONIC FATIGUE: ICD-10-CM

## 2025-05-21 LAB
ALBUMIN SERPL-MCNC: 4.2 G/DL (ref 3.5–5.2)
ALP SERPL-CCNC: 65 U/L (ref 40–129)
ALT SERPL-CCNC: 35 U/L (ref 10–50)
ANION GAP SERPL CALCULATED.3IONS-SCNC: 11 MMOL/L (ref 8–16)
AST SERPL-CCNC: 21 U/L (ref 10–50)
BASOPHILS # BLD: 0 K/UL (ref 0–0.2)
BASOPHILS NFR BLD: 0.9 % (ref 0–1)
BILIRUB SERPL-MCNC: 0.6 MG/DL (ref 0.2–1.2)
BUN SERPL-MCNC: 17 MG/DL (ref 6–20)
CALCIUM SERPL-MCNC: 8.8 MG/DL (ref 8.6–10)
CHLORIDE SERPL-SCNC: 105 MMOL/L (ref 98–107)
CO2 SERPL-SCNC: 22 MMOL/L (ref 22–29)
CREAT SERPL-MCNC: 1 MG/DL (ref 0.7–1.2)
CRP SERPL-MCNC: <3 MG/L (ref 0–5)
EOSINOPHIL # BLD: 0.1 K/UL (ref 0–0.6)
EOSINOPHIL NFR BLD: 1.5 % (ref 0–5)
ERYTHROCYTE [DISTWIDTH] IN BLOOD BY AUTOMATED COUNT: 13 % (ref 11.5–14.5)
ERYTHROCYTE [SEDIMENTATION RATE] IN BLOOD BY WESTERGREN METHOD: 2 MM/HR (ref 0–10)
FOLATE SERPL-MCNC: 10.9 NG/ML (ref 4.5–32.2)
GLUCOSE SERPL-MCNC: 100 MG/DL (ref 70–99)
HCT VFR BLD AUTO: 46.1 % (ref 42–52)
HGB BLD-MCNC: 16.1 G/DL (ref 14–18)
IMM GRANULOCYTES # BLD: 0 K/UL
IRON SATN MFR SERPL: 46 % (ref 20–50)
IRON SERPL-MCNC: 129 UG/DL (ref 59–158)
LYMPHOCYTES # BLD: 2.2 K/UL (ref 1.1–4.5)
LYMPHOCYTES NFR BLD: 46.1 % (ref 20–40)
MCH RBC QN AUTO: 30.2 PG (ref 27–31)
MCHC RBC AUTO-ENTMCNC: 34.9 G/DL (ref 33–37)
MCV RBC AUTO: 86.5 FL (ref 80–94)
MONOCYTES # BLD: 0.4 K/UL (ref 0–0.9)
MONOCYTES NFR BLD: 9.2 % (ref 0–10)
NEUTROPHILS # BLD: 2 K/UL (ref 1.5–7.5)
NEUTS SEG NFR BLD: 42.1 % (ref 50–65)
PLATELET # BLD AUTO: 263 K/UL (ref 130–400)
PMV BLD AUTO: 10.1 FL (ref 9.4–12.4)
POTASSIUM SERPL-SCNC: 3.9 MMOL/L (ref 3.5–5.1)
PROT SERPL-MCNC: 6.2 G/DL (ref 6.4–8.3)
RBC # BLD AUTO: 5.33 M/UL (ref 4.7–6.1)
SODIUM SERPL-SCNC: 138 MMOL/L (ref 136–145)
T4 FREE SERPL-MCNC: 1.21 NG/DL (ref 0.93–1.7)
TIBC SERPL-MCNC: 280 UG/DL (ref 250–400)
TSH SERPL DL<=0.005 MIU/L-ACNC: 1.98 UIU/ML (ref 0.27–4.2)
VIT B12 SERPL-MCNC: 402 PG/ML (ref 232–1245)
WBC # BLD AUTO: 4.7 K/UL (ref 4.8–10.8)

## 2025-05-22 ENCOUNTER — RESULTS FOLLOW-UP (OUTPATIENT)
Dept: PRIMARY CARE CLINIC | Age: 40
End: 2025-05-22

## 2025-05-22 LAB — NUCLEAR IGG SER QL IA: NORMAL

## 2025-05-23 LAB
SHBG SERPL-SCNC: 36 NMOL/L (ref 17–56)
SHBG SERPL-SCNC: 88 PG/ML (ref 47–244)
TESTOST SERPL-MCNC: 449 NG/DL (ref 249–836)

## 2025-05-28 DIAGNOSIS — G47.10 HYPERSOMNIA: ICD-10-CM

## 2025-05-28 DIAGNOSIS — R53.82 CHRONIC FATIGUE: Primary | ICD-10-CM

## 2025-07-03 ENCOUNTER — OFFICE VISIT (OUTPATIENT)
Dept: PULMONOLOGY | Age: 40
End: 2025-07-03

## 2025-07-03 VITALS
HEART RATE: 87 BPM | TEMPERATURE: 98.3 F | SYSTOLIC BLOOD PRESSURE: 129 MMHG | DIASTOLIC BLOOD PRESSURE: 83 MMHG | BODY MASS INDEX: 32.88 KG/M2 | WEIGHT: 222 LBS | HEIGHT: 69 IN | OXYGEN SATURATION: 96 %

## 2025-07-03 DIAGNOSIS — R53.82 CHRONIC FATIGUE: ICD-10-CM

## 2025-07-03 DIAGNOSIS — G47.10 HYPERSOMNIA: ICD-10-CM

## 2025-07-03 DIAGNOSIS — G47.8 NON-RESTORATIVE SLEEP: Primary | ICD-10-CM

## 2025-07-03 DIAGNOSIS — E66.9 OBESITY (BMI 30-39.9): ICD-10-CM

## 2025-07-03 DIAGNOSIS — R06.83 SNORING: ICD-10-CM

## 2025-07-03 DIAGNOSIS — R06.09 DOE (DYSPNEA ON EXERTION): ICD-10-CM

## 2025-07-03 DIAGNOSIS — G47.33 OBSTRUCTIVE SLEEP APNEA: ICD-10-CM

## 2025-07-03 LAB
DISTANCE WALKED: 1000 FT
SPO2: 93 %

## 2025-07-03 ASSESSMENT — ENCOUNTER SYMPTOMS
GASTROINTESTINAL NEGATIVE: 1
ALLERGIC/IMMUNOLOGIC NEGATIVE: 1
EYES NEGATIVE: 1
RESPIRATORY NEGATIVE: 1

## 2025-07-03 NOTE — PROGRESS NOTES
Pulmonary and Sleep Medicine    Rudolph Denny (:  1985) is a 39 y.o. male,New patient, here for evaluation of the following chief complaint(s):  New Patient (Chronic fatique, hypersomnia, sleep study, 23)      Referring physician:  Lorraine Roa Md  [address] [place] [address] [place] [address]     ASSESSMENT/PLAN:  1. Non-restorative sleep  -     Ambulatory referral to Sleep Medicine  -     Baseline Diagnostic Sleep Study; Future  2. Hypersomnia  3. Snoring  4. Chronic fatigue  5. Obesity (BMI 30-39.9)  6. PRITCHARD (dyspnea on exertion)  -     6 Minute Walk Test  7. Obstructive sleep apnea  -     Ambulatory referral to Sleep Medicine  -     Baseline Diagnostic Sleep Study; Future        We will obtain in lab sleep study to further evaluate hypersomnia. Discussed lifestyle modification such as increasing exercise and small frequent meals.        Return in about 3 weeks (around 2025).    SUBJECTIVE/OBJECTIVE:        HPI    Patient presents for chronic fatigue. Symptoms began 2 years ago after mono infection. He sleeps about 8 hours per night but never feels well rested. Home sleep study completed in 2023, apnea-hypopnea index at that time was 4 events per hour. He has gained about 20 pounds over the last year. He also mentions getting more easily fatigued when in the heat and sometimes short of breath with exertion. He is working full time and going to school currently and has not been as active as he was previously.     Continue the following medications as reported by the patient:    Prior to Visit Medications    Medication Sig Taking? Authorizing Provider   tamsulosin (FLOMAX) 0.4 MG capsule Take 1 capsule by mouth at bedtime Yes Angeli Mckeon, APRN - CNP   albuterol sulfate HFA (PROAIR HFA) 108 (90 Base) MCG/ACT inhaler Inhale 2 puffs into the lungs every 4 hours as needed for Wheezing or Shortness of Breath Yes Lorraine Rao MD   omeprazole (PRILOSEC) 20

## 2025-09-05 ENCOUNTER — TELEPHONE (OUTPATIENT)
Dept: PULMONOLOGY | Age: 40
End: 2025-09-05

## (undated) DEVICE — ENDO KIT,LOURDES HOSPITAL: Brand: MEDLINE INDUSTRIES, INC.